# Patient Record
Sex: MALE | Race: WHITE | NOT HISPANIC OR LATINO | Employment: FULL TIME | RURAL
[De-identification: names, ages, dates, MRNs, and addresses within clinical notes are randomized per-mention and may not be internally consistent; named-entity substitution may affect disease eponyms.]

---

## 2017-01-08 PROBLEM — Z00.00 WELLNESS EXAMINATION: Status: ACTIVE | Noted: 2017-01-08

## 2017-01-08 PROBLEM — Z02.89 ENCOUNTER FOR EXAMINATION REQUIRED BY DEPARTMENT OF TRANSPORTATION (DOT): Status: ACTIVE | Noted: 2017-01-08

## 2017-01-08 PROBLEM — N40.0 PROSTATISM: Chronic | Status: ACTIVE | Noted: 2017-01-08

## 2017-01-08 PROBLEM — G47.33 OBSTRUCTIVE SLEEP APNEA: Status: ACTIVE | Noted: 2017-01-08

## 2017-01-08 PROBLEM — F41.9 ANXIETY: Chronic | Status: ACTIVE | Noted: 2017-01-08

## 2017-01-08 PROBLEM — F32.A DEPRESSION: Chronic | Status: ACTIVE | Noted: 2017-01-08

## 2017-01-08 PROBLEM — K64.9 HEMORRHOID: Status: ACTIVE | Noted: 2017-01-08

## 2017-01-08 PROBLEM — M19.90 DEGENERATIVE JOINT DISEASE: Chronic | Status: ACTIVE | Noted: 2017-01-08

## 2017-01-16 ENCOUNTER — TELEPHONE (OUTPATIENT)
Dept: FAMILY MEDICINE CLINIC | Facility: CLINIC | Age: 52
End: 2017-01-16

## 2017-01-16 NOTE — TELEPHONE ENCOUNTER
"Vm \"I have this cough I have seen Dr Pedersen about, and it seems like it goes away and comes back, can Dr Pedersen call me in something for it or do I need to be seen?\"  "

## 2017-01-18 ENCOUNTER — OFFICE VISIT (OUTPATIENT)
Dept: FAMILY MEDICINE CLINIC | Facility: CLINIC | Age: 52
End: 2017-01-18

## 2017-01-18 VITALS
HEART RATE: 92 BPM | TEMPERATURE: 96.8 F | SYSTOLIC BLOOD PRESSURE: 118 MMHG | RESPIRATION RATE: 20 BRPM | HEIGHT: 69 IN | BODY MASS INDEX: 27.7 KG/M2 | WEIGHT: 187 LBS | DIASTOLIC BLOOD PRESSURE: 62 MMHG | OXYGEN SATURATION: 95 %

## 2017-01-18 DIAGNOSIS — R06.2 WHEEZE: ICD-10-CM

## 2017-01-18 DIAGNOSIS — G47.33 OBSTRUCTIVE SLEEP APNEA: Primary | ICD-10-CM

## 2017-01-18 DIAGNOSIS — R05.9 COUGH: ICD-10-CM

## 2017-01-18 DIAGNOSIS — J31.0 CHRONIC RHINITIS: ICD-10-CM

## 2017-01-18 DIAGNOSIS — J40 BRONCHITIS: ICD-10-CM

## 2017-01-18 DIAGNOSIS — J30.89 PERENNIAL ALLERGIC RHINITIS, UNSPECIFIED ALLERGIC RHINITIS TRIGGER: ICD-10-CM

## 2017-01-18 DIAGNOSIS — J32.9 CHRONIC SINUSITIS, UNSPECIFIED LOCATION: ICD-10-CM

## 2017-01-18 PROCEDURE — 99213 OFFICE O/P EST LOW 20 MIN: CPT | Performed by: FAMILY MEDICINE

## 2017-01-18 RX ORDER — METHYLPREDNISOLONE 4 MG/1
TABLET ORAL
Qty: 21 TABLET | Refills: 0 | Status: SHIPPED | OUTPATIENT
Start: 2017-01-18 | End: 2017-02-10

## 2017-01-18 RX ORDER — GUAIFENESIN 600 MG/1
1200 TABLET, EXTENDED RELEASE ORAL 2 TIMES DAILY
COMMUNITY
End: 2017-02-10

## 2017-01-18 RX ORDER — CEFDINIR 300 MG/1
300 CAPSULE ORAL 2 TIMES DAILY
Qty: 28 CAPSULE | Refills: 0 | Status: SHIPPED | OUTPATIENT
Start: 2017-01-18 | End: 2017-02-10

## 2017-01-18 NOTE — PROGRESS NOTES
Subjective   Selvin Garcia is a 51 y.o. male presenting with chief complaint of:   Chief Complaint   Patient presents with   • Cough       History of Present Illness :  Alone. Daily history of nasal congestion; endoscopic surgery 7.2016 helped for awhile (insurance would not allow resser to do everything he wanted at the time).  Allergy testing 2 yr ago/[facility]; mildly allergic to few things and not enough to recommend shots.  No history of asthma or smoking.  Around [place] increased nasal congestion/drainage and cough.  Seen 12.28.16 and Z pack, medrol dose pack helped.  After that; sxs nasal/sinus congestion has returned bilateral nares.  Cough on/off; recent first time associated wheezing.  No fevers.  No heartburn, reflux feeling.     Other chronic problem/s to consider:    The following portions of the patient's history were reviewed and updated as appropriate: allergies, current medications, past family history, past medical history, past social history, past surgical history and problem list.  TCC migrated earlier/reviewed      Current Outpatient Prescriptions:   •  albuterol (PROVENTIL HFA;VENTOLIN HFA) 108 (90 BASE) MCG/ACT inhaler, Inhale 2 puffs Every 4 (Four) Hours As Needed for wheezing., Disp: 1 inhaler, Rfl: 1  •  fluticasone (FLONASE) 50 MCG/ACT nasal spray, INSTILL ONE SPRAY IN EACH NOSTRIL TWICE A DAY, Disp: 16 g, Rfl: 3  •  guaiFENesin (MUCINEX) 600 MG 12 hr tablet, Take 1,200 mg by mouth 2 (Two) Times a Day., Disp: , Rfl:   •  Loratadine (CLARITIN) 10 MG capsule, Take 1 tablet by mouth Daily., Disp: , Rfl:   •  montelukast (SINGULAIR) 10 MG tablet, TAKE ONE TABLET BY MOUTH EVERY NIGHT AT BEDTIME, Disp: 30 tablet, Rfl: 3  •  pantoprazole (PROTONIX) 40 MG EC tablet, Take 1 tablet by mouth Daily., Disp: 15 tablet, Rfl: 0    Allergies   Allergen Reactions   • Lexapro [Escitalopram Oxalate]    • Ultram [Tramadol Hcl]        Review of Systems  ROS:  GENERAL:  Active/slower with limits, speed,  "samni for age and alittle less \"breath\" with exertion; mild shortness of breath.  Sleeps ok; declines cpap.  No fever.  ENDO:  No syncope, near or diaphoretic sweaty spells.   HEENT: No head injury; occ headache,  No vision change, No hearing loss/pain/drainage.  No sore throat.  Daily nasal/sinus congestion/drainage. No epistaxis.  CHEST: No chest wall tenderness or mass. No hemoptysis.  CV: No exertional chest pain, palpatations, ankle edema.  GI: No heartburn, dysphagia, abdominal pain, diarrhea, constipation, rectal bleeding, or melena.  :  Voids without dysuria, or incontience to completion.  ORTHO: No painful/swollen joints but various on /off sore.  No change infrequent sore neck or back.  No acute neck or back pain without recent injury.   NEURO: No dizziness, weakness of extremities.  No numbness/parethesias.   PSYCH: No memory loss.  Mood good; not anxious, depressed or suicidal.  Tolerated stress.     Results for orders placed or performed during the hospital encounter of 07/26/16   CBC (No diff)   Result Value Ref Range    WBC 5.15 4.80 - 10.80 K/mcL    RBC 5.29 4.80 - 5.90 M/mcL    Hemoglobin 16.1 14.0 - 18.0 g/dL    Hematocrit 44.7 40.0 - 52.0 %    MCV 84.5 82.0 - 95.0 fL    MCH 30.4 28.0 - 32.0 pg    MCHC 36.0 33.0 - 36.0 gm/dL    RDW-SD 38.6 (L) 40.0 - 54.0 fL    RDW-CV 12.7 12.0 - 15.0 %    RDW 12.7 12 - 15 %    Platelets 264 130 - 400 K/mcL   Comprehensive metabolic panel   Result Value Ref Range    Sodium 143 135 - 145 mmol/L    Potassium 4.9 3.5 - 5.3 mmol/L    Chloride 104 98 - 110 mmol/L    CO2 27 24 - 31 mmol/L    Glucose 85 70 - 100 mg/dL    BUN 19 5 - 21 mg/dL    Creatinine 0.97 0.5 - 1.4 mg/dL    Calcium 9.0 8.4 - 10.4 mg/dL    Total Protein 7.2 6.3 - 8.7 g/dL    Albumin 4.1 3.5 - 5.0 g/dL    Total Bilirubin 0.9 0.1 - 1.0 mg/dL    Alkaline Phosphatase 64 24 - 120 Units/L    AST (SGOT) 31 7 - 45 Units/L    ALT (SGPT) 20 0 - 54 Units/L    Anion Gap 12 4 - 13 mmol/L    Est GFR by Clearance " "82 ml/min/1.732   Converted Surgical Pathology   Result Value Ref Range    CONVERTED (HISTORICAL) CASE TYPE Surgical Pathology     CONVERTED (HISTORICAL) ACCESSION NUMBER [id number]     CONVERTED (HISTORICAL) RESULT STATUS FINAL     CONVERTED (HISTORICAL) SPECIMEN DESCRIPTION Right and Left Sinus contents        No results found for: HGBA1C    Lab Results   Component Value Date     07/19/2016    K 4.9 07/19/2016     07/19/2016    CO2 27 07/19/2016    GLUCOSE 85 07/19/2016    BUN 19 07/19/2016    CREATININE 0.97 07/19/2016    CALCIUM 9.0 07/19/2016    EGFRCLEARA 82 07/19/2016       No results found for: PSA     Objective   Visit Vitals   • /62 (BP Location: Left arm, Patient Position: Sitting, Cuff Size: Adult)   • Pulse 92   • Temp 96.8 °F (36 °C) (Tympanic)   • Resp 20   • Ht 69\" (175.3 cm)   • Wt 187 lb (84.8 kg)   • SpO2 95%   • BMI 27.62 kg/m2       Physical Exam  GENERAL:  Well nourished/developed  in no acute distress.  SKIN: Turgor excellent, without wound, rash, lesion.   HEENT: Normal cephalic without trauma.  Pupils equal round reactive to light. Extraocular motions full without nystagmus.   External canals nonobstructive nontender without reddness. Tymphatic membranes lucy with willian structures intact.   Oral cavity without growths, exudates, and moist.  Posterior pharnyx without mass, obstruction, reddness.  No thyroidmegaly, mass, tenderness, lymphadenopathy and supple.   CV: Regular rhythm.  No murmur, gallop, edema. No carotid bruits.   CHEST: No chest wall tenderness or mass.   LUNGS: Symmetric motion with clear to auscultation.    ABD: Soft, nontender without mass.   ORTHO: Symmetric extremities without swelling/point tenderness.   NEURO: CN 2-12 grossly intact.  Symmetric facies. 4-5/5 strength throughout.  Nonfocal use extremities. Speech clear.    PSYCH: Oriented x 3.  Pleasant calm, well kept.  Purposeful/directed conservation with intact short/long gross memory. "     Assessment/Plan     1. Obstructive sleep apnea  Not significant; not treating    2. Chronic sinusitis, unspecified location  back    3. Perennial allergic rhinitis, unspecified allergic rhinitis trigger  Part of    4. Chronic rhinitis  back    5. Wheeze  With above-sinus related    6. Cough  Likely sinus related    Rx: reviewed/see above and:   Omnicef 300 two a day for 14 days  Medrol-again  LAB: reviewed past TCC as needed, above and new orders: none  Refer back to Resser; now or he wants to wait alittle while and see if helps  Wrap-up/other instructions  Patient Instructions   Get back with Resser as planned/later      Follow up: Return if symptoms worsen or fail to improve, for and a yearly exam once a year.

## 2017-01-18 NOTE — MR AVS SNAPSHOT
Selvin Garcia   1/18/2017 9:00 AM   Office Visit    Dept Phone:  [phone]   Encounter #:  [id number]    Provider:  Aquilino Pedersen MD   Department:  [facility] FAM[facility]                Your Full Care Plan              Today's Medication Changes          These changes are accurate as of: 1/18/17  9:43 AM.  If you have any questions, ask your nurse or doctor.               New Medication(s)Ordered:     cefdinir 300 MG capsule   Commonly known as:  OMNICEF   Take 1 capsule by mouth 2 (Two) Times a Day.   Started by:  Aquilino Pedersen MD            Where to Get Your Medications      These medications were sent to [facility] - [place], [place] - [address]  - [phone]  - [phone] FX  [address] , [facility] [address]     Phone:  [phone]     cefdinir 300 MG capsule    MethylPREDNISolone 4 MG tablet                  Your Updated Medication List          This list is accurate as of: 1/18/17  9:43 AM.  Always use your most recent med list.                albuterol 108 (90 BASE) MCG/ACT inhaler   Commonly known as:  PROVENTIL HFA;VENTOLIN HFA   Inhale 2 puffs Every 4 (Four) Hours As Needed for wheezing.       cefdinir 300 MG capsule   Commonly known as:  OMNICEF   Take 1 capsule by mouth 2 (Two) Times a Day.       CLARITIN 10 MG capsule   Generic drug:  Loratadine       fluticasone 50 MCG/ACT nasal spray   Commonly known as:  FLONASE   INSTILL ONE SPRAY IN EACH NOSTRIL TWICE A DAY       guaiFENesin 600 MG 12 hr tablet   Commonly known as:  MUCINEX       MethylPREDNISolone 4 MG tablet   Commonly known as:  MEDROL (FIDENCIO)   Take as directed on package instructions.       montelukast 10 MG tablet   Commonly known as:  SINGULAIR   TAKE ONE TABLET BY MOUTH EVERY NIGHT AT BEDTIME       pantoprazole 40 MG EC tablet   Commonly known as:  PROTONIX   Take 1 tablet by mouth Daily.               You Were Diagnosed With        Codes Comments    "Obstructive sleep apnea    -  Primary ICD-10-CM: G47.33  ICD-9-CM: 327.23     Chronic sinusitis, unspecified location     ICD-10-CM: J32.9  ICD-9-CM: 473.9     Perennial allergic rhinitis, unspecified allergic rhinitis trigger     ICD-10-CM: J30.89  ICD-9-CM: 477.8     Chronic rhinitis     ICD-10-CM: J31.0  ICD-9-CM: 472.0     Wheeze     ICD-10-CM: R06.2  ICD-9-CM: 786.07     Bronchitis     ICD-10-CM: J40  ICD-9-CM: 490     Cough     ICD-10-CM: R05  ICD-9-CM: 786.2       Instructions    Get back with Resser as planned/later     Patient Instructions History      Upcoming Appointments     Visit Type Date Time Department    OFFICE VISIT 2017  9:00 AM [facility]    FOLLOW UP 3/28/2017  3:45 PM [facility]    WELLNESS 2018  2:45 PM [facility]      MyChart Signup     Our records indicate that you have declined [facility] [id number] signup. If you would like to sign up for [id number], please email [email or web address] or call [phone] to obtain an activation code.             Other Info from Your Visit           Your Appointments     Mar 28, 2017  3:45 PM CDT   Follow Up with TAYLOR Stark   [facility] (--)    [address] [place] [address] [address]  [facility] [id number] [phone]           Arrive 15 minutes prior to appointment.            2018  2:45 PM CST   Wellness with Aquilino Pedersen MD   [facility] (--)    [address] [place] [id number] [phone]              Allergies     Lexapro [Escitalopram Oxalate]      Ultram [Tramadol Hcl]        Reason for Visit     Cough           Vital Signs     Blood Pressure Pulse Temperature Respirations Height Weight    118/62 (BP Location: Left arm, Patient Position: Sitting, Cuff Size: Adult) 92 96.8 °F (36 °C) (Tympanic) 20 69\" (175.3 cm) 187 lb (84.8 kg)    Oxygen Saturation Body Mass Index Smoking Status             95% 27.62 kg/m2 Never Smoker       "   Problems and Diagnoses Noted     Nasal inflammation due to allergen    Chronic inflammation of the nose    Chronic sinus infection    Sleep apnea    Wheeze        Bronchitis        Cough

## 2017-02-03 DIAGNOSIS — R05.9 COUGH: ICD-10-CM

## 2017-02-03 DIAGNOSIS — J40 BRONCHITIS: ICD-10-CM

## 2017-02-03 RX ORDER — ALBUTEROL SULFATE 90 UG/1
2 AEROSOL, METERED RESPIRATORY (INHALATION) EVERY 4 HOURS PRN
Qty: 3 INHALER | Refills: 0 | Status: SHIPPED | OUTPATIENT
Start: 2017-02-03 | End: 2017-04-14 | Stop reason: SDUPTHER

## 2017-02-10 ENCOUNTER — OFFICE VISIT (OUTPATIENT)
Dept: FAMILY MEDICINE CLINIC | Facility: CLINIC | Age: 52
End: 2017-02-10

## 2017-02-10 VITALS
TEMPERATURE: 97.2 F | SYSTOLIC BLOOD PRESSURE: 120 MMHG | WEIGHT: 186 LBS | HEIGHT: 69 IN | RESPIRATION RATE: 18 BRPM | HEART RATE: 88 BPM | OXYGEN SATURATION: 95 % | DIASTOLIC BLOOD PRESSURE: 80 MMHG | BODY MASS INDEX: 27.55 KG/M2

## 2017-02-10 DIAGNOSIS — R06.02 SHORTNESS OF BREATH: Primary | ICD-10-CM

## 2017-02-10 PROCEDURE — 99213 OFFICE O/P EST LOW 20 MIN: CPT | Performed by: FAMILY MEDICINE

## 2017-02-10 RX ORDER — EPINEPHRINE 0.3 MG/.3ML
0.3 INJECTION SUBCUTANEOUS ONCE
Qty: 2 EACH | Refills: 0 | Status: SHIPPED | OUTPATIENT
Start: 2017-02-10 | End: 2017-02-10

## 2017-02-10 NOTE — PROGRESS NOTES
Subjective   Selvin Garcia is a 51 y.o. male presenting with chief complaint of:   Chief Complaint   Patient presents with   • Cough   • Shortness of Breath       History of Present Illness :  Alone.   Has an acute issue today: sob episodes. Come/go and feel like chest tightness.  No relation to activity, time day, and exertion.  ProAir helps. Has had recent UTI type illness; but sinus congestion, cough resolved; these spells keep happening.  No choking, dysphagia. No heartburn beyond occassional.  Occ wheeze with this.  No past Dx asthma.      Other chronic problem/s to consider:  Allergic rhinitis:  This has been present for years/over a year.  The nasal/sinus congestion on/off has been present for years and is currently stable/ same as usual.     The following portions of the patient's history were reviewed and updated as appropriate: allergies, current medications, past family history, past medical history, past social history, past surgical history and problem list.  TCC migrated if needed/reviewed.      Current Outpatient Prescriptions:   •  albuterol (PROVENTIL HFA;VENTOLIN HFA) 108 (90 BASE) MCG/ACT inhaler, Inhale 2 puffs Every 4 (Four) Hours As Needed for wheezing., Disp: 3 inhaler, Rfl: 0  •  fluticasone (FLONASE) 50 MCG/ACT nasal spray, INSTILL ONE SPRAY IN EACH NOSTRIL TWICE A DAY, Disp: 16 g, Rfl: 3    Allergies   Allergen Reactions   • Lexapro [Escitalopram Oxalate]    • Ultram [Tramadol Hcl]        Review of Systems  GENERAL:  Active/slower with limits, speed, samni for age work/home. Sleep is ok. No fever.  ENDO:  No syncope, near or diaphoretic sweaty spells.  HEENT: No head injury  headache,  No vision change, No hearing loss.  Ears without pain/drainage.  No sore throat.  No nasal/sinus congestion/drainage. No epistaxis.  CHEST: No chest wall tenderness or mass. Occ cough, wheeze, SOB; no hemoptysis.  CV: No chest pain, palpatations, ankle edema.  GI: No heartburn, dysphagia.  No abdominal  pain, diarrhea, constipation, rectal bleeding, or melena.  :  Voids without dysuria, or incontience to completion.  ORTHO: No painful/swollen joints but various on /off sore.  No change rare sore neck or back.  No acute neck or back pain without recent injury.   NEURO: No dizziness, weakness of extremities.  No numbness/parethesias.   PSYCH: No memory loss.  Mood good; not anxious, depressed but/and not suicidal.  Tolerated stress.     Results for orders placed or performed during the hospital encounter of 07/26/16   CBC (No diff)   Result Value Ref Range    WBC 5.15 4.80 - 10.80 K/mcL    RBC 5.29 4.80 - 5.90 M/mcL    Hemoglobin 16.1 14.0 - 18.0 g/dL    Hematocrit 44.7 40.0 - 52.0 %    MCV 84.5 82.0 - 95.0 fL    MCH 30.4 28.0 - 32.0 pg    MCHC 36.0 33.0 - 36.0 gm/dL    RDW-SD 38.6 (L) 40.0 - 54.0 fL    RDW-CV 12.7 12.0 - 15.0 %    RDW 12.7 12 - 15 %    Platelets 264 130 - 400 K/mcL   Comprehensive metabolic panel   Result Value Ref Range    Sodium 143 135 - 145 mmol/L    Potassium 4.9 3.5 - 5.3 mmol/L    Chloride 104 98 - 110 mmol/L    CO2 27 24 - 31 mmol/L    Glucose 85 70 - 100 mg/dL    BUN 19 5 - 21 mg/dL    Creatinine 0.97 0.5 - 1.4 mg/dL    Calcium 9.0 8.4 - 10.4 mg/dL    Total Protein 7.2 6.3 - 8.7 g/dL    Albumin 4.1 3.5 - 5.0 g/dL    Total Bilirubin 0.9 0.1 - 1.0 mg/dL    Alkaline Phosphatase 64 24 - 120 Units/L    AST (SGOT) 31 7 - 45 Units/L    ALT (SGPT) 20 0 - 54 Units/L    Anion Gap 12 4 - 13 mmol/L    Est GFR by Clearance 82 ml/min/1.732   Converted Surgical Pathology   Result Value Ref Range    CONVERTED (HISTORICAL) CASE TYPE Surgical Pathology     CONVERTED (HISTORICAL) ACCESSION NUMBER [id number]     CONVERTED (HISTORICAL) RESULT STATUS FINAL     CONVERTED (HISTORICAL) SPECIMEN DESCRIPTION Right and Left Sinus contents        No results found for: HGBA1C    Lab Results   Component Value Date     07/19/2016    K 4.9 07/19/2016     07/19/2016    CO2 27 07/19/2016    GLUCOSE 85 07/19/2016  "   BUN 19 07/19/2016    CREATININE 0.97 07/19/2016    CALCIUM 9.0 07/19/2016    EGFRCLEARA 82 07/19/2016       No results found for: PSA     Objective   Visit Vitals   • /80 (BP Location: Left arm, Patient Position: Sitting, Cuff Size: Adult)   • Pulse 88   • Temp 97.2 °F (36.2 °C) (Tympanic)   • Resp 18   • Ht 69\" (175.3 cm)   • Wt 186 lb (84.4 kg)   • SpO2 95%   • BMI 27.47 kg/m2       Physical Exam  GENERAL:  Well nourished/developed in no acute distress.  SKIN: Turgor excellent, without wound, rash, lesion.  HEENT: Normal cephalic without trauma.  Pupils equal round reactive to light. Extraocular motions full without nystagmus.   External canals nonobstructive nontender without reddness. Tymphatic membranes lucy with willian structures intact.   Oral cavity without growths, exudates, and moist.  Posterior pharnyx without mass, obstruction, reddness.  No thyroidmegaly, mass, tenderness, lymphadenopathy and supple.  CV: Regular rhythm.  No murmur, gallop,  edema. Posterior pulses intact.  No carotid bruits.  CHEST: No chest wall tenderness or mass.   LUNGS: Symmetric motion with clear to auscultation.  No dullness to percussion  ABD: Soft, nontender without mass.   ORTHO: Symmetric extremities without swelling/point tenderness.  Full gross range of motion.    NEURO: CN 2-12 grossly intact.  Symmetric facies.  UE/LE   5/5 strength throughout.  Nonfocal use extremities. Speech clear.     PSYCH: Oriented x 3.  Pleasant calm, well kept.  Purposeful/directed conservation with intact short/long gross memory.     Assessment/Plan     1. Shortness of breath  Sounds as asthma  - Spirometry With Bronchodilator  - Spirometry Without Bronchodilator  - XR chest 2 vw  - EPINEPHrine (EPIPEN 2-FIDENCIO) 0.3 MG/0.3ML solution auto-injector injection; Inject 0.3 mL into the shoulder, thigh, or buttocks 1 (One) Time for 1 dose.  Dispense: 2 each; Refill: 0      Rx: reviewed.  Changes if above  LAB: reviewed/above, and if orders 6-12m "   Wrap-up/other instructions  Regular cardio exercise something everyone should consider and try to do; discussed  Normal weight a goal for everyone; discussed  Healthy diet helpful for weight management, illness prevention; discussed  If over 50-screening exams include men PSA/rectal exam, women mammograms, and  everyone colonoscopy screening for colon cancer.   There are no Patient Instructions on file for this visit.    Follow up: Return for based on test.

## 2017-02-21 ENCOUNTER — HOSPITAL ENCOUNTER (OUTPATIENT)
Dept: PULMONOLOGY | Facility: HOSPITAL | Age: 52
Discharge: HOME | End: 2017-02-21
Attending: FAMILY MEDICINE | Admitting: FAMILY MEDICINE

## 2017-02-21 ENCOUNTER — HOSPITAL ENCOUNTER (OUTPATIENT)
Dept: GENERAL RADIOLOGY | Facility: HOSPITAL | Age: 52
Discharge: HOME | End: 2017-02-21

## 2017-02-21 PROCEDURE — 94060 EVALUATION OF WHEEZING: CPT

## 2017-02-21 PROCEDURE — 71020 HC CHEST PA AND LATERAL: CPT

## 2017-02-21 RX ORDER — ALBUTEROL SULFATE 2.5 MG/3ML
2.5 SOLUTION RESPIRATORY (INHALATION) ONCE
Status: DISCONTINUED | OUTPATIENT
Start: 2017-02-21 | End: 2017-02-22 | Stop reason: HOSPADM

## 2017-03-14 ENCOUNTER — OFFICE VISIT (OUTPATIENT)
Dept: OTOLARYNGOLOGY | Facility: CLINIC | Age: 52
End: 2017-03-14

## 2017-03-14 VITALS
SYSTOLIC BLOOD PRESSURE: 133 MMHG | DIASTOLIC BLOOD PRESSURE: 85 MMHG | BODY MASS INDEX: 26.51 KG/M2 | TEMPERATURE: 97.9 F | HEIGHT: 69 IN | HEART RATE: 51 BPM | WEIGHT: 179 LBS

## 2017-03-14 DIAGNOSIS — J32.9 CHRONIC SINUSITIS, UNSPECIFIED LOCATION: Primary | ICD-10-CM

## 2017-03-14 DIAGNOSIS — J30.89 PERENNIAL ALLERGIC RHINITIS, UNSPECIFIED ALLERGIC RHINITIS TRIGGER: ICD-10-CM

## 2017-03-14 PROCEDURE — 99213 OFFICE O/P EST LOW 20 MIN: CPT | Performed by: NURSE PRACTITIONER

## 2017-03-14 RX ORDER — AMOXICILLIN AND CLAVULANATE POTASSIUM 875; 125 MG/1; MG/1
1 TABLET, FILM COATED ORAL 2 TIMES DAILY
Qty: 10 TABLET | Refills: 1 | Status: SHIPPED | OUTPATIENT
Start: 2017-03-14 | End: 2017-03-24

## 2017-03-14 RX ORDER — PREDNISONE 10 MG/1
TABLET ORAL
Qty: 47 TABLET | Refills: 0 | Status: SHIPPED | OUTPATIENT
Start: 2017-03-14 | End: 2017-03-29

## 2017-03-14 RX ORDER — CETIRIZINE HYDROCHLORIDE 10 MG/1
10 TABLET ORAL DAILY
Qty: 30 TABLET | Refills: 3 | Status: SHIPPED | OUTPATIENT
Start: 2017-03-14 | End: 2017-04-13

## 2017-03-14 NOTE — PATIENT INSTRUCTIONS
Use Afrin spray, neosynephrine or other decongestant spray for 3 days. After using, use a Conchis pot or Neilmed Sinus Rinse or similar nasal irrigation device. Do not use the decongestant spray for more than 3 days or you may develop a rebound rhinitis/ nasal congestion. Make sure you boil the water before mixing the saline and let the mix cool to a comfortable temperature before using.       Salt Water Rinse for the Nose (Buffered Hypertonic Saline Nasal Irrigation)    The Benefits  • When you rinse your nose with this salt water and baking soda mixture, it washes crusts and other debris from your nose.  • Salty water pulls fluid out of the swollen membranes of your nose.  This decongests the nose and improves air flow.  Not only does this make breathing easier, but it helps open the sinus passages.  • Studies show that this mixture of concentrated salt water and baking soda (bicarbonate) helps the nose work better and moves mucus out of the nose faster.    The Recipe  • Carefully clean and rinse a 1-quart glass jar.  Fill the clean jar with tap water or bottled water.  You do not have to boil the water.  • Add 2 to 3 heaping teaspoons of “pickling/yong” salt.  Do not use table salt, which has unwanted additives.  You can ask for pickling/yong salt at the grocery store.    • Add 1 rounded teaspoon of baking soda (pure bicarbonate).  • Stir or shake before each use.  Store at room temperature.  After a week, pour out any mixture that is left over and make a new recipe.  • If the mixture seems too strong, use less salt-try 1-1/2 to 2 teaspoons of salt. For children, it is best to start with a weaker salt water mixture.  Then gradually increase to using 2 to 3 heaping teaspoons of salt, or whatever the child will accept.    How to Rinse the Nose with Salt Water (Buffered Hypertonic Saline Nasal Irrigation)    The Instructions  Plan to rinse the nose with the salt water mixture 2 to 3 times each day.  Make the  salt water and baking soda mixture according to the recipe.  You will need a bulb/ear syringe, a large medical syringe (30 ml), or a Waterpik.  • Pour some salt water mixture into a clean bowl.  Many people like to warm the sale water in a microwave oven to about body temperature.  Be sure that the salt water is not hot.  • Fill the syringe with salt water from the bowl.  Do not put your used syringe back into the jar, because that will contaminate your salt water.  • Stand over the sink or in the shower and squirt the salt water into each side of your nose.  Aim the stream toward the back of your head, not the top of your head.  This lets you spit some of the salt water out of your mouth.  It will not hurt if you swallow a little.  • Most people notice a mild burning feeling the first few times they use the salt water mixture.  This usually goes away in a few days.  Please call our office if you have any problems or questions.    For Young Children  You can put the salt water into a small commercial spray container, like a nasal steroid spray bottle.  Squirt it many times into each side of the nose.  Do not force your child to lie down.  This rinse is easier to do when sitting or standing.    If you use a Nasal Steroid  You should always use the salt water mixture first, then use your nasal steroid spray (like Flonase, Vancenase, Beconase, Nasacort).  The steroid works better when it is sprayed onto nasal membranes that have been cleaned and decongested by the salt water.  Then the steroid medicine will reach deeper into the nose and sinuses.

## 2017-03-14 NOTE — PROGRESS NOTES
Patient Care Team:  Aquilino Pedersen MD as PCP - General  Aquilino Pedersen MD as PCP - Family Medicine  Oscar Urrutia MD as Consulting Physician (Otolaryngology)  TAYLOR Stark as Physician Assistant (Otolaryngology)    Chief Complaint   Patient presents with   • Sinus Problem     Sinus Infection/6 month follow up       Subjective   History of Present Illness:  Selvin Garcia is a  51 y.o.  male who complains of nasal drainage, nasal congestion, repeated sinusitis, sinus pressure, postnasal drip, allergy, sneezing, itchy eyes and headaches. The symptoms are not localized to a particular location. The patient has had moderate symptoms. The symptoms have been chronically occuring for the last several months . The symptoms are aggravated by allergy . The symptoms are improved by antibiotics and steroids.     The patient has had a previous FESS in July 2016. He states that postoperatively had relief for a short length of time; however, symptoms have returned that are as severe as his pre-op symptoms. He has been allergy tested in the past (Aug 2015) and was not started on immunotherapy. He currently takes Singulair and Flonase; he does not feel that Singulair works.     The patient has been on 2-3 antibiotics and 2 medrol packs since December with minimal relief. He states he has consistent thick, yellow drainage that he blows out and coughs up daily. He has sinus pressure and frequent headaches as well.     Review of Systems:  Review of Systems   Constitutional: Negative for chills and fever.   HENT:        See HPI   Eyes: Negative.    Respiratory: Positive for cough. Negative for shortness of breath.    Cardiovascular: Negative.    Gastrointestinal: Negative for nausea and vomiting.   Allergic/Immunologic: Positive for environmental allergies. Negative for food allergies.   Neurological: Positive for headaches. Negative for dizziness.   Hematological: Negative.    Psychiatric/Behavioral:  Negative.        Past History:  Past Medical History   Diagnosis Date   • Abnormal CT of paranasal sinuses 08/20/2015     (Note 1 of 3)  NASAL SEPTUM:  The nasal septum is relatively midline.  MAXILLARY SINUSES:  The left maxillary sinus showed >5 m mucosal thickening and 50% opacification and the right maxillary sinus showed >5 mm mucosal thickening. The osteomeatal complex is obstructed bilaterally.   • Abnormal CT of paranasal sinuses 08/20/2015     (Note 2 of 3)  NASAL TURBINATES: The inferior turbinates showed bilateral hypertrophy. The Cherie bullosa are not present.  ETHMOID SINUSES: The left ethmoid sinus showed >3 mm mucosal thickening and complete opacification. The lamina papyracea appears intact bilaterally.   • Abnormal CT of paranasal sinuses 08/20/2015     (Note 3 of 3)  FRONTAL SINUSES:  The left frontal sinus showed complete opacification. The right frontal sinus showed complete opacification. SPENOID SINUSES: The left sphenoid sinus showed moderate mucosal thickening. The right sphenoid sinus showed moderate mucosal thickening.   • Allergic rhinitis 12/01/2015   • Chronic rhinitis    • Chronic sinusitis    • Disturbances of sensation of smell and taste 12/01/2015   • Sensorineural hearing loss, bilateral 12/01/2015   • Sleep apnea    • Subjective tinnitus 12/01/2015     Past Surgical History   Procedure Laterality Date   • Functional endoscopic sinus surgery  07/26/2016   • Other surgical history       Open Reduction-Internal Fixation   • Septoplasty       Per Dr. Urrutia 7-8 years ago     Family History   Problem Relation Age of Onset   • Diabetes Other      Social History   Substance Use Topics   • Smoking status: Never Smoker   • Smokeless tobacco: Never Used   • Alcohol use Yes      Comment: Current some day - 1 week       Current Outpatient Prescriptions:   •  albuterol (PROVENTIL HFA;VENTOLIN HFA) 108 (90 BASE) MCG/ACT inhaler, Inhale 2 puffs Every 4 (Four) Hours As Needed for wheezing., Disp:  3 inhaler, Rfl: 0  •  beclomethasone (QVAR) 80 MCG/ACT inhaler, Inhale 1 puff 2 (Two) Times a Day., Disp: 3 inhaler, Rfl: 1  •  fluticasone (FLONASE) 50 MCG/ACT nasal spray, INSTILL ONE SPRAY IN EACH NOSTRIL TWICE A DAY, Disp: 16 g, Rfl: 3  •  montelukast (SINGULAIR) 10 MG tablet, TAKE ONE TABLET BY MOUTH EVERY NIGHT AT BEDTIME, Disp: 30 tablet, Rfl: 3  •  amoxicillin-clavulanate (AUGMENTIN) 875-125 MG per tablet, Take 1 tablet by mouth 2 (Two) Times a Day for 10 days., Disp: 10 tablet, Rfl: 1  •  cetirizine (zyrTEC) 10 MG tablet, Take 1 tablet by mouth Daily for 30 days. 1 by mouth every day as needed for allergy symptoms, Disp: 30 tablet, Rfl: 3  •  predniSONE (DELTASONE) 10 MG tablet, Take this number of pills twice daily: day 1-3: 2.5 pills, day 4-6:  2 pills, day 7-9: 1.5 pills, day 10-12: 1 pill, day 13-15: 0.5 pill, Disp: 47 tablet, Rfl: 0  Allergies:  Lexapro [escitalopram oxalate] and Ultram [tramadol hcl]    Objective     Vital Signs:  Temp:  [97.9 °F (36.6 °C)] 97.9 °F (36.6 °C)  Heart Rate:  [51] 51  BP: (133)/(85) 133/85    Physical Exam:  CONSTITUTIONAL: well nourished, well-developed, alert, oriented, in no acute distress   COMMUNICATION AND VOICE: able to communicate normally, normal voice quality  HEAD: normocephalic, no lesions, atraumatic, no tenderness, no masses   FACE: appearance normal, no lesions, no tenderness, no deformities, facial motion symmetric  SALIVARY GLANDS: parotid glands with no tenderness, no swelling, no masses, submandibular glands with normal size, nontender  EYES: ocular motility normal, eyelids normal, orbits normal, no proptosis, conjunctiva normal , pupils equal, round   EARS:  Hearing: response to conversational voice normal bilaterally   External Ears: auricles without lesions  Otoscopic: tympanic membrane appearance normal, no lesions, no perforation, normal mobility, no fluid  NOSE:  External Nose: structure normal, no tenderness on palpation, no nasal discharge, no  lesions, no evidence of trauma, nostrils patent   Intranasal Exam: nasal mucosa with moderate erythema and inflammation, vestibule within normal limits, inferior turbinate normal, nasal septum midline   ORAL:  Lips: upper and lower lips without lesion   Teeth: dentition within normal limits for age   Gums: gingivae healthy   Oral Mucosa: oral mucosa normal, no mucosal lesions   Floor of Mouth: Warthin’s duct patent, mucosa normal  Tongue: lingual mucosa normal without lesions, normal tongue mobility   Palate: soft and hard palates with normal mucosa and structure  Oropharynx: oropharyngeal mucosa with stringy exudates posteriorly, tonsils normal in appearance   NECK: neck appearance normal, no masses or tenderness  THYROID: no overt thyromegaly, no tenderness, nodules or mass present on palpation, position midline   LYMPH NODES: no lymphadenopathy  CHEST/RESPIRATORY: respiratory effort normal, normal breath sounds   CARDIOVASCULAR: rate and rhythm normal, extremities without cyanosis or edema    NEUROLOGIC/PSYCHIATRIC: oriented to time, place and person, mood normal, affect appropriate, CN II-XII intact grossly    Results Review:   None.     Assessment   1. Chronic sinusitis, unspecified location    2. Perennial allergic rhinitis, unspecified allergic rhinitis trigger        Plan   Continue current management plan. Will try a longer course of antibiotics with a stronger steroid taper and switch antihistamine. At follow-up we will do a CT of his sinuses. Continue nasal sprays as directed. I have given him instructions for a nasal lavage as well.      -------MEDICATIONS:-------  New Medications Ordered This Visit   Medications   • amoxicillin-clavulanate (AUGMENTIN) 875-125 MG per tablet     Sig: Take 1 tablet by mouth 2 (Two) Times a Day for 10 days.     Dispense:  10 tablet     Refill:  1     10 day course with 1 refill for a total of 20 days of antibiotics   • predniSONE (DELTASONE) 10 MG tablet     Sig: Take this  number of pills twice daily: day 1-3: 2.5 pills, day 4-6:  2 pills, day 7-9: 1.5 pills, day 10-12: 1 pill, day 13-15: 0.5 pill     Dispense:  47 tablet     Refill:  0   • cetirizine (zyrTEC) 10 MG tablet     Sig: Take 1 tablet by mouth Daily for 30 days. 1 by mouth every day as needed for allergy symptoms     Dispense:  30 tablet     Refill:  3           -----INSTRUCTIONS-----  For the best response, use your nasal sprays every day without skipping doses. It may take several weeks before the full effect is acheived.    Use Afrin spray, neosynephrine or other decongestant spray for 3 days. After using, use a [place] pot or Neilmed Sinus Rinse or similar nasal irrigation device. Do not use the decongestant spray for more than 3 days or you may develop a rebound rhinitis/ nasal congestion. Make sure you boil the water before mixing the saline and let the mix cool to a comfortable temperature before using.      -----FOLLOW UP-----  Return in about 6 weeks (around 4/25/2017) for Recheck sinus with CT.    Dotty Mary, RIRI  03/14/17  12:17 PM

## 2017-03-27 ENCOUNTER — OFFICE VISIT (OUTPATIENT)
Dept: FAMILY MEDICINE CLINIC | Facility: CLINIC | Age: 52
End: 2017-03-27

## 2017-03-27 VITALS
HEART RATE: 62 BPM | TEMPERATURE: 98.4 F | HEIGHT: 69 IN | BODY MASS INDEX: 27.55 KG/M2 | RESPIRATION RATE: 18 BRPM | OXYGEN SATURATION: 98 % | DIASTOLIC BLOOD PRESSURE: 70 MMHG | WEIGHT: 186 LBS | SYSTOLIC BLOOD PRESSURE: 120 MMHG

## 2017-03-27 DIAGNOSIS — J31.0 CHRONIC RHINITIS: ICD-10-CM

## 2017-03-27 DIAGNOSIS — J32.9 CHRONIC SINUSITIS, UNSPECIFIED LOCATION: ICD-10-CM

## 2017-03-27 DIAGNOSIS — F32.A DEPRESSION, UNSPECIFIED DEPRESSION TYPE: Primary | Chronic | ICD-10-CM

## 2017-03-27 DIAGNOSIS — F41.9 ANXIETY: Chronic | ICD-10-CM

## 2017-03-27 DIAGNOSIS — R06.02 SHORTNESS OF BREATH: ICD-10-CM

## 2017-03-27 PROCEDURE — 99213 OFFICE O/P EST LOW 20 MIN: CPT | Performed by: FAMILY MEDICINE

## 2017-03-27 NOTE — PROGRESS NOTES
Subjective   Selvin Garcia is a 51 y.o. male presenting with chief complaint of:   Chief Complaint   Patient presents with   • Depression   • Anxiety   • Shortness of Breath       History of Present Illness :  Alone. Recent visit with cough; sent back to [facility] after sinus surgery 7.2016.  CT sinus showed concerns.  Working with [facility] office; current Rx has stopped cough.  No heartburn, reflux, wheeze; feeling better.      Other chronic problem/s to consider:  Obstructive Sleep Apnea:  This has been present for years/over a year.  It is chronic.    They are not compliant with use of Cpap machine; not believing he has JESSY anymore.     The following portions of the patient's history were reviewed and updated as appropriate: allergies, current medications, past family history, past medical history, past social history, past surgical history and problem list.  TCC migrated if needed/reviewed.      Current Outpatient Prescriptions:   •  albuterol (PROVENTIL HFA;VENTOLIN HFA) 108 (90 BASE) MCG/ACT inhaler, Inhale 2 puffs Every 4 (Four) Hours As Needed for wheezing., Disp: 3 inhaler, Rfl: 0  •  cetirizine (zyrTEC) 10 MG tablet, Take 1 tablet by mouth Daily for 30 days. 1 by mouth every day as needed for allergy symptoms, Disp: 30 tablet, Rfl: 3  •  fluticasone (FLONASE) 50 MCG/ACT nasal spray, INSTILL ONE SPRAY IN EACH NOSTRIL TWICE A DAY, Disp: 16 g, Rfl: 3  •  predniSONE (DELTASONE) 10 MG tablet, Take this number of pills twice daily: day 1-3: 2.5 pills, day 4-6:  2 pills, day 7-9: 1.5 pills, day 10-12: 1 pill, day 13-15: 0.5 pill, Disp: 47 tablet, Rfl: 0    Allergies   Allergen Reactions   • Lexapro [Escitalopram Oxalate]    • Ultram [Tramadol Hcl]        Review of Systems  GENERAL:  Active home/work. . Sleep is ok. No fever now.   ENDO:  No syncope, near or diaphoretic sweaty spells.  HEENT: No head injury or headache,  No vision change, No hearing loss.  Ears without pain/drainage.  No sore throat.  Improved/less  nasal/sinus congestion/drainage. No epistaxis.  CHEST: No chest wall tenderness or mass. No cough,  without wheeze, SOB; no hemoptysis.  CV: No chest pain, palpatations, ankle edema.  GI: No heartburn, dysphagia.  No abdominal pain, diarrhea, constipation, rectal bleeding, or melena.    :  Voids without dysuria, or incontience to completion.  ORTHO: No painful/swollen joints but various on /off sore.  No change occ sore neck or back.  No acute neck or back pain without recent injury.   NEURO: No dizziness, weakness of extremities.  No numbness/parethesias.   PSYCH: No memory loss.  Mood good; not that anxious, depressed but/and not suicidal.  Tolerated stress.     Results for orders placed or performed during the hospital encounter of 07/26/16   CBC (No diff)   Result Value Ref Range    WBC 5.15 4.80 - 10.80 K/mcL    RBC 5.29 4.80 - 5.90 M/mcL    Hemoglobin 16.1 14.0 - 18.0 g/dL    Hematocrit 44.7 40.0 - 52.0 %    MCV 84.5 82.0 - 95.0 fL    MCH 30.4 28.0 - 32.0 pg    MCHC 36.0 33.0 - 36.0 gm/dL    RDW-SD 38.6 (L) 40.0 - 54.0 fL    RDW-CV 12.7 12.0 - 15.0 %    RDW 12.7 12 - 15 %    Platelets 264 130 - 400 K/mcL   Comprehensive metabolic panel   Result Value Ref Range    Sodium 143 135 - 145 mmol/L    Potassium 4.9 3.5 - 5.3 mmol/L    Chloride 104 98 - 110 mmol/L    CO2 27 24 - 31 mmol/L    Glucose 85 70 - 100 mg/dL    BUN 19 5 - 21 mg/dL    Creatinine 0.97 0.5 - 1.4 mg/dL    Calcium 9.0 8.4 - 10.4 mg/dL    Total Protein 7.2 6.3 - 8.7 g/dL    Albumin 4.1 3.5 - 5.0 g/dL    Total Bilirubin 0.9 0.1 - 1.0 mg/dL    Alkaline Phosphatase 64 24 - 120 Units/L    AST (SGOT) 31 7 - 45 Units/L    ALT (SGPT) 20 0 - 54 Units/L    Anion Gap 12 4 - 13 mmol/L    Est GFR by Clearance 82 ml/min/1.732   Converted Surgical Pathology   Result Value Ref Range    CONVERTED (HISTORICAL) CASE TYPE Surgical Pathology     CONVERTED (HISTORICAL) ACCESSION NUMBER [id number]     CONVERTED (HISTORICAL) RESULT STATUS FINAL     CONVERTED (HISTORICAL)  "SPECIMEN DESCRIPTION Right and Left Sinus contents        CBC:     BMP:    THYROID:      Invalid input(s): T4  LIPID:      Invalid input(s): TOTAL CHOLESTROL, TRIGLYCERIDES  PSA:      No results found for: HGBA1C    Lab Results   Component Value Date     07/19/2016    K 4.9 07/19/2016     07/19/2016    CO2 27 07/19/2016    GLUCOSE 85 07/19/2016    BUN 19 07/19/2016    CREATININE 0.97 07/19/2016    CALCIUM 9.0 07/19/2016    EGFRCLEARA 82 07/19/2016       No results found for: PSA     Objective   /70 (BP Location: Left arm, Patient Position: Sitting, Cuff Size: Adult)  Pulse 62  Temp 98.4 °F (36.9 °C) (Oral)   Resp 18  Ht 69\" (175.3 cm)  Wt 186 lb (84.4 kg)  SpO2 98%  BMI 27.47 kg/m2    Physical Exam  GENERAL:  Well nourished/developed in no acute distress.   SKIN: Turgor excellent, without wound, rash, lesion.  HEENT: Normal cephalic without trauma.  Pupils equal round reactive to light. Extraocular motions full without nystagmus.   External canals nonobstructive nontender without reddness. Tymphatic membranes lucy with willian structures intact.   Oral cavity without growths, exudates, and moist.  Posterior pharnyx without mass, obstruction, reddness.  No thyroidmegaly, mass, tenderness, lymphadenopathy and supple.  CV: Regular rhythm.  No murmur, gallop,  edema.  CHEST: No chest wall tenderness or mass.   LUNGS: Symmetric motion with clear to auscultation.   ABD: Soft, nontender without mass.   ORTHO: Symmetric extremities without swelling/point tenderness.  Full gross range of motion.    NEURO: CN 2-12 grossly intact.  Symmetric facies.  UE/LE   3-4/5 strength throughout.  Nonfocal use extremities. Speech clear.     PSYCH: Oriented x 3.  Pleasant calm, well kept.  Purposeful/directed conservation with intact short/long gross memory.     Assessment/Plan     1. Depression, unspecified depression type  History; doing well now    2. Anxiety  Same as depression    3. Shortness of " breath  Resolved; was more associated with resumption of sinus issues    4. Chronic rhinitis  same    5. Chronic sinusitis, unspecified location  Same       Rx: reviewed; finish resser  LAB: reviewed/above; at least yearly  Wrap-up/other instructions  Regular cardio exercise something everyone should consider and try to do; discussed  Normal weight a goal for everyone; discussed  Healthy diet helpful for weight management, illness prevention; discussed  If over 50-screening exams include men PSA/rectal exam, women mammograms, and  everyone colonoscopy screening for colon cancer.   There are no Patient Instructions on file for this visit.    Follow up: Return for as planned 1.12.18.

## 2017-04-14 DIAGNOSIS — R05.9 COUGH: ICD-10-CM

## 2017-04-14 DIAGNOSIS — J40 BRONCHITIS: ICD-10-CM

## 2017-04-26 ENCOUNTER — OFFICE VISIT (OUTPATIENT)
Dept: OTOLARYNGOLOGY | Facility: CLINIC | Age: 52
End: 2017-04-26

## 2017-04-26 VITALS
SYSTOLIC BLOOD PRESSURE: 125 MMHG | BODY MASS INDEX: 27.4 KG/M2 | TEMPERATURE: 98 F | DIASTOLIC BLOOD PRESSURE: 82 MMHG | WEIGHT: 185 LBS | HEART RATE: 77 BPM | HEIGHT: 69 IN

## 2017-04-26 DIAGNOSIS — J32.9 CHRONIC SINUSITIS, UNSPECIFIED LOCATION: ICD-10-CM

## 2017-04-26 DIAGNOSIS — J30.89 PERENNIAL ALLERGIC RHINITIS, UNSPECIFIED ALLERGIC RHINITIS TRIGGER: Primary | ICD-10-CM

## 2017-04-26 PROCEDURE — 99213 OFFICE O/P EST LOW 20 MIN: CPT | Performed by: OTOLARYNGOLOGY

## 2017-04-26 RX ORDER — AZELASTINE 1 MG/ML
2 SPRAY, METERED NASAL 2 TIMES DAILY
Qty: 30 ML | Refills: 6 | Status: SHIPPED | OUTPATIENT
Start: 2017-04-26 | End: 2017-05-26

## 2017-04-26 RX ORDER — CETIRIZINE HYDROCHLORIDE 10 MG/1
TABLET ORAL
Refills: 3 | COMMUNITY
Start: 2017-04-19 | End: 2017-06-12 | Stop reason: SDUPTHER

## 2017-04-26 RX ORDER — AZELASTINE 1 MG/ML
2 SPRAY, METERED NASAL 2 TIMES DAILY
Qty: 30 ML | Refills: 6 | Status: SHIPPED | OUTPATIENT
Start: 2017-04-26 | End: 2017-04-26 | Stop reason: SDUPTHER

## 2017-04-26 RX ORDER — AZELASTINE HYDROCHLORIDE 0.5 MG/ML
1 SOLUTION/ DROPS OPHTHALMIC 2 TIMES DAILY
Qty: 6 ML | Refills: 12 | Status: SHIPPED | OUTPATIENT
Start: 2017-04-26 | End: 2017-06-12 | Stop reason: SDUPTHER

## 2017-04-26 NOTE — PROGRESS NOTES
Patient Care Team:  Aquilino Pedersen MD as PCP - General  Aquilino Pedersen MD as PCP - Family Medicine  Oscar Urrutia MD as Consulting Physician (Otolaryngology)  TAYLOR Stark as Physician Assistant (Otolaryngology)    Chief Complaint   Patient presents with   • Follow-up     Re-Check Sinus w/CT Scan       Subjective   History of Present Illness:  Selvin Garcia is a  51 y.o.  male who is here for follow up sinusitus/allergy/CT sinus results. He has had some improvement but continued nasal drainage and headaches. The symptoms are not localized to a particular location. He has had mild symptoms. The symptoms have been intermittant for the last several months . The symptoms are aggravated by  allergy . The symptoms are improved by antihistamines, nasal sprays.  He denies congestion and sinusitis. Patient added Zyrtec 6 weeks and states he has noticed significant improvement since then. He has previously had allergy testing.     Review of Systems:  Review of Systems   Constitutional: Negative for chills and fever.   HENT:        See HPI   Eyes: Negative.    Respiratory: Negative.    Cardiovascular: Negative.    Gastrointestinal: Negative.    Allergic/Immunologic: Positive for environmental allergies. Negative for food allergies.   Neurological: Negative.    Hematological: Negative.    Psychiatric/Behavioral: Negative.        Past History:  Past Medical History:   Diagnosis Date   • Abnormal CT of paranasal sinuses 08/20/2015    (Note 1 of 3)  NASAL SEPTUM:  The nasal septum is relatively midline.  MAXILLARY SINUSES:  The left maxillary sinus showed >5 m mucosal thickening and 50% opacification and the right maxillary sinus showed >5 mm mucosal thickening. The osteomeatal complex is obstructed bilaterally.   • Abnormal CT of paranasal sinuses 08/20/2015    (Note 2 of 3)  NASAL TURBINATES: The inferior turbinates showed bilateral hypertrophy. The Cherie bullosa are not present.  ETHMOID  SINUSES: The left ethmoid sinus showed >3 mm mucosal thickening and complete opacification. The lamina papyracea appears intact bilaterally.   • Abnormal CT of paranasal sinuses 08/20/2015    (Note 3 of 3)  FRONTAL SINUSES:  The left frontal sinus showed complete opacification. The right frontal sinus showed complete opacification. SPENOID SINUSES: The left sphenoid sinus showed moderate mucosal thickening. The right sphenoid sinus showed moderate mucosal thickening.   • Allergic rhinitis 12/01/2015   • Chronic rhinitis    • Chronic sinusitis    • Disturbances of sensation of smell and taste 12/01/2015   • Sensorineural hearing loss, bilateral 12/01/2015   • Sleep apnea    • Subjective tinnitus 12/01/2015     Past Surgical History:   Procedure Laterality Date   • FUNCTIONAL ENDOSCOPIC SINUS SURGERY  07/26/2016   • OTHER SURGICAL HISTORY      Open Reduction-Internal Fixation   • SEPTOPLASTY      Per Dr. Urrutia 7-8 years ago     Family History   Problem Relation Age of Onset   • Diabetes Other      Social History   Substance Use Topics   • Smoking status: Never Smoker   • Smokeless tobacco: Never Used   • Alcohol use Yes      Comment: Current some day - 1 week       Current Outpatient Prescriptions:   •  cetirizine (zyrTEC) 10 MG tablet, , Disp: , Rfl: 3  •  fluticasone (FLONASE) 50 MCG/ACT nasal spray, INSTILL ONE SPRAY IN EACH NOSTRIL TWICE A DAY, Disp: 16 g, Rfl: 3  •  PROAIR  (90 BASE) MCG/ACT inhaler, USE 2 INHALATIONS EVERY 4 HOURS AS NEEDED FOR WHEEZING, Disp: 25.5 g, Rfl: 0  •  azelastine (ASTELIN) 0.1 % nasal spray, 2 sprays into each nostril 2 (Two) Times a Day for 30 days. Use in each nostril as directed, Disp: 30 mL, Rfl: 6  •  azelastine (OPTIVAR) 0.05 % ophthalmic solution, Administer 1 drop to both eyes 2 (Two) Times a Day., Disp: 6 mL, Rfl: 12  Allergies:  Lexapro [escitalopram oxalate] and Ultram [tramadol hcl]    Objective     Vital Signs:  Temp:  [98 °F (36.7 °C)] 98 °F (36.7 °C)  Heart Rate:   [77] 77  BP: (125)/(82) 125/82    Physical Exam:  CONSTITUTIONAL: well nourished, well-developed, alert, oriented, in no acute distress   COMMUNICATION AND VOICE: able to communicate normally for age, normal voice quality  HEAD: normocephalic, no lesions, atraumatic, no tenderness, no masses   FACE: appearance normal, no lesions, no tenderness, no deformities, facial motion symmetric  EYES: ocular motility normal, eyelids normal, orbits normal, no proptosis, conjunctiva normal , pupils equal, round   EARS:  Hearing: response to conversational voice normal bilaterally   External Ears: auricles without lesions  NOSE:  External Nose: structure normal, no tenderness on palpation, no nasal discharge, no lesions, no evidence of trauma, nostrils patent   Intranasal exam: nasal mucosa with mucosal congestion and erythema, nasal septum relatively midline   ORAL:  Lips: upper and lower lips without lesion   NECK: neck appearance normal  CHEST/RESPIRATORY: respiratory effort normal, normal chest excursion; no wheezes  CARDIOVASCULAR: extremities without cyanosis or edema   NEUROLOGIC/PSYCHIATRIC: oriented appropriately, mood normal, affect appropriate, CN II-XII intact grossly      Results Review:   CT sinus reviewed.          Assessment   1. Perennial allergic rhinitis, unspecified allergic rhinitis trigger    2. Chronic sinusitis, unspecified location        Plan   Continue current management plan. We have discussed continuing with medical management versus additional sinus surgery. His allergy testing results were fairly negative, however, he may benefit from immunotherapy as well. At this time the patient would like to proceed with current medications, including adding Astelin and going back on Singulair.      -------MEDICATIONS:-------  New Medications Ordered This Visit   Medications   • azelastine (OPTIVAR) 0.05 % ophthalmic solution     Sig: Administer 1 drop to both eyes 2 (Two) Times a Day.     Dispense:  6 mL      Refill:  12   • azelastine (ASTELIN) 0.1 % nasal spray     Si sprays into each nostril 2 (Two) Times a Day for 30 days. Use in each nostril as directed     Dispense:  30 mL     Refill:  6        -----INSTRUCTIONS-----  For the best response, use your nasal sprays every day without skipping doses. It may take several weeks before the full effect is acheived.      -----FOLLOW UP-----  Return in about 4 months (around 2017) for Recheck allergy/sinus.      RIRI Ramirez  17  9:20 AM

## 2017-04-26 NOTE — PROGRESS NOTES
I have seen and examined Selvin Garcia and have reviewed the notes, assessments, and/or procedures and I concur with this documentation.    He has improved the symptoms from the previous visit.  His symptoms sound fairly allergic as they seem to be responding with Zyrtec.  Previous allergy testing was weakly positive.  His repeat CT scan has open antrostomies and anterior ethmoidectomies, but with a lot of inflammatory disease in the frontal sinus, maxillary sinus, ethmoid sinus and sphenoid sinus. We discussed options of modifying his medication versus considering immunotherapy versus repeat endoscopic sinus surgery.  He wished to try medication management as he seems to be improving.    Oscar Urrutia MD  4/26/2017  9:51 AM

## 2017-05-09 DIAGNOSIS — J30.89 PERENNIAL ALLERGIC RHINITIS, UNSPECIFIED ALLERGIC RHINITIS TRIGGER: ICD-10-CM

## 2017-05-09 DIAGNOSIS — J32.9 CHRONIC SINUSITIS, UNSPECIFIED LOCATION: ICD-10-CM

## 2017-06-05 ENCOUNTER — TELEPHONE (OUTPATIENT)
Dept: OTOLARYNGOLOGY | Facility: CLINIC | Age: 52
End: 2017-06-05

## 2017-06-05 RX ORDER — AMOXICILLIN AND CLAVULANATE POTASSIUM 875; 125 MG/1; MG/1
1 TABLET, FILM COATED ORAL EVERY 12 HOURS
Qty: 20 TABLET | Refills: 0 | Status: SHIPPED | OUTPATIENT
Start: 2017-06-05 | End: 2017-06-15

## 2017-06-05 RX ORDER — METHYLPREDNISOLONE 4 MG/1
TABLET ORAL
Qty: 1 EACH | Refills: 0 | Status: SHIPPED | OUTPATIENT
Start: 2017-06-05 | End: 2017-06-11

## 2017-06-05 NOTE — TELEPHONE ENCOUNTER
Pt has a sinus infection with severe ringing in the ears from it. Ok to send in abx and steroid per [place]

## 2017-06-12 RX ORDER — CETIRIZINE HYDROCHLORIDE 10 MG/1
10 TABLET ORAL DAILY
Qty: 90 TABLET | Refills: 2 | Status: SHIPPED | OUTPATIENT
Start: 2017-06-12 | End: 2018-02-18 | Stop reason: SDUPTHER

## 2017-06-12 RX ORDER — FLUTICASONE PROPIONATE 50 MCG
2 SPRAY, SUSPENSION (ML) NASAL DAILY
Qty: 3 EACH | Refills: 2 | Status: SHIPPED | OUTPATIENT
Start: 2017-06-12 | End: 2018-02-18 | Stop reason: SDUPTHER

## 2017-06-12 RX ORDER — AZELASTINE HYDROCHLORIDE 0.5 MG/ML
1 SOLUTION/ DROPS OPHTHALMIC 2 TIMES DAILY
Qty: 3 EACH | Refills: 2 | Status: SHIPPED | OUTPATIENT
Start: 2017-06-12 | End: 2017-09-10

## 2017-06-27 ENCOUNTER — TELEPHONE (OUTPATIENT)
Dept: OTOLARYNGOLOGY | Facility: CLINIC | Age: 52
End: 2017-06-27

## 2017-06-27 RX ORDER — AMOXICILLIN AND CLAVULANATE POTASSIUM 875; 125 MG/1; MG/1
1 TABLET, FILM COATED ORAL EVERY 12 HOURS
Qty: 20 TABLET | Refills: 0 | Status: SHIPPED | OUTPATIENT
Start: 2017-06-27 | End: 2017-07-07

## 2017-06-27 NOTE — TELEPHONE ENCOUNTER
Pt called stating he has another infection. I have talked to [facility] and moved up his appt, per [place] went ahead and gave one more round of abx.

## 2017-07-13 DIAGNOSIS — J40 BRONCHITIS: ICD-10-CM

## 2017-07-13 DIAGNOSIS — R05.9 COUGH: ICD-10-CM

## 2017-07-26 ENCOUNTER — OFFICE VISIT (OUTPATIENT)
Dept: OTOLARYNGOLOGY | Facility: CLINIC | Age: 52
End: 2017-07-26

## 2017-07-26 VITALS
SYSTOLIC BLOOD PRESSURE: 140 MMHG | HEART RATE: 76 BPM | HEIGHT: 69 IN | WEIGHT: 184.13 LBS | DIASTOLIC BLOOD PRESSURE: 80 MMHG | BODY MASS INDEX: 27.27 KG/M2 | TEMPERATURE: 97.8 F

## 2017-07-26 DIAGNOSIS — J32.9 CHRONIC SINUSITIS, UNSPECIFIED LOCATION: ICD-10-CM

## 2017-07-26 DIAGNOSIS — J30.89 PERENNIAL ALLERGIC RHINITIS, UNSPECIFIED ALLERGIC RHINITIS TRIGGER: Primary | ICD-10-CM

## 2017-07-26 DIAGNOSIS — J31.0 CHRONIC RHINITIS: ICD-10-CM

## 2017-07-26 PROCEDURE — 99213 OFFICE O/P EST LOW 20 MIN: CPT | Performed by: OTOLARYNGOLOGY

## 2017-07-26 RX ORDER — MONTELUKAST SODIUM 10 MG/1
10 TABLET ORAL DAILY
Qty: 30 TABLET | Refills: 3 | Status: SHIPPED | OUTPATIENT
Start: 2017-07-26 | End: 2017-08-25

## 2017-07-26 NOTE — PROGRESS NOTES
Patient Care Team:  Aquilino Pedersen MD as PCP - General  Aquilino Pedersen MD as PCP - Family Medicine  Oscar Urrutia MD as Consulting Physician (Otolaryngology)  TAYLOR Stark as Physician Assistant (Otolaryngology)    Chief Complaint   Patient presents with   • Follow-up     Perennial allergic rhinitis, unspecified allergic rhinitis trigger       Subjective   HPI   Selvin Garcia is a  51 y.o. male who presents for follow up. He has had stable complaints. He has had some sneezing fit spells that are intermittent and severe in nature.     Review of Systems:  Reviewed per patient intake note    Past History:  Past Medical History:   Diagnosis Date   • Abnormal CT of paranasal sinuses 08/20/2015    (Note 1 of 3)  NASAL SEPTUM:  The nasal septum is relatively midline.  MAXILLARY SINUSES:  The left maxillary sinus showed >5 m mucosal thickening and 50% opacification and the right maxillary sinus showed >5 mm mucosal thickening. The osteomeatal complex is obstructed bilaterally.   • Abnormal CT of paranasal sinuses 08/20/2015    (Note 2 of 3)  NASAL TURBINATES: The inferior turbinates showed bilateral hypertrophy. The Cherie bullosa are not present.  ETHMOID SINUSES: The left ethmoid sinus showed >3 mm mucosal thickening and complete opacification. The lamina papyracea appears intact bilaterally.   • Abnormal CT of paranasal sinuses 08/20/2015    (Note 3 of 3)  FRONTAL SINUSES:  The left frontal sinus showed complete opacification. The right frontal sinus showed complete opacification. SPENOID SINUSES: The left sphenoid sinus showed moderate mucosal thickening. The right sphenoid sinus showed moderate mucosal thickening.   • Allergic rhinitis 12/01/2015   • Chronic rhinitis    • Chronic sinusitis    • Disturbances of sensation of smell and taste 12/01/2015   • Sensorineural hearing loss, bilateral 12/01/2015   • Sleep apnea    • Subjective tinnitus 12/01/2015     Past Surgical History:    Procedure Laterality Date   • FUNCTIONAL ENDOSCOPIC SINUS SURGERY  07/26/2016   • OTHER SURGICAL HISTORY      Open Reduction-Internal Fixation   • SEPTOPLASTY      Per Dr. Urrutia 7-8 years ago     Family History   Problem Relation Age of Onset   • Diabetes Other      Social History   Substance Use Topics   • Smoking status: Never Smoker   • Smokeless tobacco: Never Used   • Alcohol use Yes      Comment: Current some day - 1 week       Current Outpatient Prescriptions:   •  azelastine (OPTIVAR) 0.05 % ophthalmic solution, Administer 1 drop to both eyes 2 (Two) Times a Day for 90 days., Disp: 3 each, Rfl: 2  •  cetirizine (zyrTEC) 10 MG tablet, Take 1 tablet by mouth Daily for 90 days., Disp: 90 tablet, Rfl: 2  •  fluticasone (FLONASE) 50 MCG/ACT nasal spray, 2 sprays into each nostril Daily for 90 days., Disp: 3 each, Rfl: 2  •  PROAIR  (90 BASE) MCG/ACT inhaler, USE 2 INHALATIONS EVERY 4 HOURS AS NEEDED FOR WHEEZING, Disp: 25.5 g, Rfl: 1  •  montelukast (SINGULAIR) 10 MG tablet, Take 1 tablet by mouth Daily for 30 days., Disp: 30 tablet, Rfl: 3  •  QVAR 80 MCG/ACT inhaler, , Disp: , Rfl:   Allergies:  Lexapro [escitalopram oxalate] and Ultram [tramadol hcl]    Objective     Vital Signs:  Temp:  [97.8 °F (36.6 °C)] 97.8 °F (36.6 °C)  Heart Rate:  [76] 76  BP: (140)/(80) 140/80    Physical Exam  CONSTITUTIONAL: well nourished, well-developed, alert, oriented, in no acute distress   COMMUNICATION AND VOICE: able to communicate normally for age, normal voice quality  HEAD: normocephalic, no lesions, atraumatic, no tenderness, no masses   FACE: appearance normal, no lesions, no tenderness, no deformities, facial motion symmetric  EYES: ocular motility normal, eyelids normal, orbits normal, no proptosis, conjunctiva normal , pupils equal, round   EARS:  Hearing: response to conversational voice normal bilaterally   External Ears: auricles without lesions  NOSE:  External Nose: structure normal, no tenderness on  palpation, no nasal discharge, no lesions, no evidence of trauma, nostrils patent   Intranasal exam: nasal mucosa with mucosal congestion and erythema, nasal septum relatively midline   ORAL:  Lips: upper and lower lips without lesion   NECK: neck appearance normal  CHEST/RESPIRATORY: respiratory effort normal, normal chest excursion  CARDIOVASCULAR: extremities without cyanosis or edema   NEUROLOGIC/PSYCHIATRIC: oriented appropriately, mood normal, affect appropriate, CN II-XII intact grossly          Assessment   1. Perennial allergic rhinitis, unspecified allergic rhinitis trigger    2. Chronic sinusitis, unspecified location    3. Chronic rhinitis        Plan   Medical and surgical options were discussed including continued medial management, reviewion functional endoscopic sinus surgery and immunotherapy. Risks, benefits and alternatives were discussed and questions were answered. After considering the options, the patient decided to proceed with continued medical management.    Return in about 4 months (around 11/26/2017).    Oscar Urrutia MD  07/26/17  10:03 AM

## 2017-07-26 NOTE — PROGRESS NOTES
Patient Intake Note    Review of Systems  Review of Systems   Constitutional: Negative for chills, fatigue and fever.   HENT:        See HPI   Respiratory: Positive for wheezing. Negative for cough, choking and shortness of breath.    Cardiovascular: Negative.    Gastrointestinal: Negative for constipation, diarrhea, nausea and vomiting.   Allergic/Immunologic: Positive for environmental allergies. Negative for food allergies.   Neurological: Positive for headaches (due to sinus issues). Negative for dizziness and light-headedness.   Hematological: Does not bruise/bleed easily.   Psychiatric/Behavioral: Positive for sleep disturbance.         Jaja Davidson  7/26/2017  9:27 AM

## 2017-09-05 ENCOUNTER — OFFICE VISIT (OUTPATIENT)
Dept: FAMILY MEDICINE CLINIC | Facility: CLINIC | Age: 52
End: 2017-09-05

## 2017-09-05 VITALS
HEART RATE: 80 BPM | HEIGHT: 69 IN | DIASTOLIC BLOOD PRESSURE: 78 MMHG | TEMPERATURE: 98.7 F | SYSTOLIC BLOOD PRESSURE: 120 MMHG | BODY MASS INDEX: 27.11 KG/M2 | OXYGEN SATURATION: 96 % | RESPIRATION RATE: 18 BRPM | WEIGHT: 183 LBS

## 2017-09-05 DIAGNOSIS — J30.89 PERENNIAL ALLERGIC RHINITIS, UNSPECIFIED ALLERGIC RHINITIS TRIGGER: ICD-10-CM

## 2017-09-05 DIAGNOSIS — Z02.89 ENCOUNTER FOR EXAMINATION REQUIRED BY DEPARTMENT OF TRANSPORTATION (DOT): Primary | ICD-10-CM

## 2017-09-05 DIAGNOSIS — J45.902 ASTHMA WITH STATUS ASTHMATICUS, UNSPECIFIED ASTHMA SEVERITY: ICD-10-CM

## 2017-09-05 PROBLEM — Z01.89 LABORATORY TEST: Status: ACTIVE | Noted: 2017-09-05

## 2017-09-05 PROBLEM — J45.909 ASTHMA: Status: ACTIVE | Noted: 2017-09-05

## 2017-09-05 PROCEDURE — DOTPHY: Performed by: FAMILY MEDICINE

## 2017-09-05 RX ORDER — MONTELUKAST SODIUM 10 MG/1
10 TABLET ORAL NIGHTLY
COMMUNITY
End: 2017-10-09 | Stop reason: SDUPTHER

## 2017-10-09 RX ORDER — MONTELUKAST SODIUM 10 MG/1
10 TABLET ORAL NIGHTLY
Qty: 90 TABLET | Refills: 1 | Status: SHIPPED | OUTPATIENT
Start: 2017-10-09 | End: 2018-01-07

## 2017-10-19 RX ORDER — CETIRIZINE HYDROCHLORIDE 10 MG/1
10 TABLET ORAL DAILY PRN
Qty: 90 TABLET | Refills: 2 | COMMUNITY
End: 2018-02-19 | Stop reason: SDUPTHER

## 2017-11-20 ENCOUNTER — OFFICE VISIT (OUTPATIENT)
Dept: OTOLARYNGOLOGY | Facility: CLINIC | Age: 52
End: 2017-11-20

## 2017-11-20 VITALS
BODY MASS INDEX: 27.67 KG/M2 | SYSTOLIC BLOOD PRESSURE: 128 MMHG | HEIGHT: 69 IN | WEIGHT: 186.8 LBS | DIASTOLIC BLOOD PRESSURE: 74 MMHG | TEMPERATURE: 97.7 F

## 2017-11-20 DIAGNOSIS — J01.91 ACUTE RECURRENT SINUSITIS, UNSPECIFIED LOCATION: ICD-10-CM

## 2017-11-20 DIAGNOSIS — J30.89 CHRONIC NON-SEASONAL ALLERGIC RHINITIS, UNSPECIFIED TRIGGER: Primary | ICD-10-CM

## 2017-11-20 DIAGNOSIS — J32.9 CHRONIC SINUSITIS, UNSPECIFIED LOCATION: ICD-10-CM

## 2017-11-20 PROCEDURE — 1036F TOBACCO NON-USER: CPT | Performed by: OTOLARYNGOLOGY

## 2017-11-20 PROCEDURE — 99214 OFFICE O/P EST MOD 30 MIN: CPT | Performed by: OTOLARYNGOLOGY

## 2017-11-20 RX ORDER — AMOXICILLIN AND CLAVULANATE POTASSIUM 875; 125 MG/1; MG/1
1 TABLET, FILM COATED ORAL 2 TIMES DAILY
Qty: 20 TABLET | Refills: 0 | Status: SHIPPED | OUTPATIENT
Start: 2017-11-20 | End: 2017-11-30

## 2017-11-20 RX ORDER — MELOXICAM 15 MG/1
15 TABLET ORAL AS NEEDED
COMMUNITY
End: 2018-09-19

## 2017-11-20 RX ORDER — FLUTICASONE PROPIONATE 50 MCG
2 SPRAY, SUSPENSION (ML) NASAL DAILY
Qty: 1 BOTTLE | Refills: 6 | Status: SHIPPED | OUTPATIENT
Start: 2017-11-20 | End: 2020-09-04

## 2017-11-20 NOTE — PATIENT INSTRUCTIONS
For the best response, use your nasal sprays every day without skipping doses. It may take several weeks before the full effect is acheived.    Use Afrin spray, neosynephrine or other decongestant spray for 3 days. After using, use a Conchis pot or Neilmed Sinus Rinse or similar nasal irrigation device. Do not use the decongestant spray for more than 3 days or you may develop a rebound rhinitis/ nasal congestion. Make sure you boil the water before mixing the saline and let the mix cool to a comfortable temperature before using.   MyPlate from [employer]  The general, healthful diet is based on the 2010 Dietary Guidelines for Americans. The amount of food you need to eat from each food group depends on your age, sex, and level of physical activity and can be individualized by a dietitian. Go to ChooseMyPlate.gov for more information.  WHAT DO I NEED TO KNOW ABOUT THE MYPLATE PLAN?  · Enjoy your food, but eat less.    · Avoid oversized portions.      ½ of your plate should include fruits and vegetables.    ¼ of your plate should be grains.    ¼ of your plate should be protein.  Grains  · Make at least half of your grains whole grains.  · For a 2,000 calorie daily food plan, eat 6 oz every day.  · 1 oz is about 1 slice bread, 1 cup cereal, or ½ cup cooked rice, cereal, or pasta.  Vegetables  · Make half your plate fruits and vegetables.  · For a 2,000 calorie daily food plan, eat 2½ cups every day.  · 1 cup is about 1 cup raw or cooked vegetables or vegetable juice or 2 cups raw leafy greens.  Fruits  · Make half your plate fruits and vegetables.  · For a 2,000 calorie daily food plan, eat 2 cups every day.  · 1 cup is about 1 cup fruit or 100% fruit juice or ½ cup dried fruit.  Protein  · For a 2,000 calorie daily food plan, eat 5½ oz every day.  · 1 oz is about 1 oz meat, poultry, or fish, ¼ cup cooked beans, 1 egg, 1 Tbsp peanut butter, or ½ oz nuts or seeds.  Dairy  · Switch to fat-free or low-fat (1%) milk.  · For a  2,000 calorie daily food plan, eat 3 cups every day.  · 1 cup is about 1 cup milk or yogurt or soy milk (soy beverage), 1½ oz natural cheese, or 2 oz processed cheese.  Fats, Oils, and Empty Calories  · Only small amounts of oils are recommended.  · Empty calories are calories from solid fats or added sugars.  · Compare sodium in foods like soup, bread, and frozen meals. Choose the foods with lower numbers.  · Drink water instead of sugary drinks.  WHAT FOODS CAN I EAT?  Grains  Whole grains such as whole wheat, quinoa, millet, and bulgur. Bread, rolls, and pasta made from whole grains. Brown or wild rice. Hot or cold cereals made from whole grains and without added sugar.  Vegetables  All fresh vegetables, especially fresh red, dark green, or orange vegetables. Peas and beans. Low-sodium frozen or canned vegetables prepared without added salt. Low-sodium vegetable juices.  Fruits  All fresh, frozen, and dried fruits. Canned fruit packed in water or fruit juice without added sugar. Fruit juices without added sugar.  Meats and Other Protein Sources  Boiled, baked, or grilled lean meat trimmed of fat. Skinless poultry. Fresh seafood and shellfish. Canned seafood packed in water. Unsalted nuts and unsalted nut butters. Tofu. Dried beans and pea. Eggs.  Dairy  Low-fat or fat-free milk, yogurt, and cheeses.   Sweets and Desserts  Frozen desserts made from low-fat milk.  Fats and Oils  Olive, peanut, and canola oils and margarine. Salad dressing and mayonnaise made from these oils.  Other  Soups and casseroles made from allowed ingredients and without added fat or salt.  The items listed above may not be a complete list of recommended foods or beverages. Contact your dietitian for more options.   WHAT FOODS ARE NOT RECOMMENDED?  Grains  Sweetened, low-fiber cereals. Packaged baked goods. Snack crackers and chips. Cheese crackers, butter crackers, and biscuits. Frozen waffles, sweet breads, doughnuts, pastries, packaged  baking mixes, pancakes, cakes, and cookies.  Vegetables  Regular canned or frozen vegetables or vegetables prepared with salt. Canned tomatoes. Canned tomato sauce. Fried vegetables. Vegetables in cream sauce or cheese sauce.  Fruits  Fruits packed in syrup or made with added sugar.   Meats and Other Protein Sources  Marbled or fatty meats such as ribs. Poultry with skin. Fried meats, poultry, eggs, or fish. Sausages, hot dogs, and deli meats such as pastrami, bologna, or salami.  Dairy  Whole milk, cream, cheeses made from whole milk, sour cream. Ice cream or yogurt made from whole milk or with added sugar.  Beverages  For adults, no more than one alcoholic drink per day. Regular soft drinks or other sugary beverages. Juice drinks.  Sweets and Desserts  Sugary or fatty desserts, candy, and other sweets.  Fats and Oils  Solid shortening or partially hydrogenated oils. Solid margarine. Margarine that contains trans fats. Butter.  The items listed above may not be a complete list of foods and beverages to avoid. Contact your dietitian for more information.     This information is not intended to replace advice given to you by your health care provider. Make sure you discuss any questions you have with your health care provider.     Document Released: 01/06/2009 Document Revised: 01/08/2016 Document Reviewed: 11/26/2014  [employer] Interactive Patient Education ©2017 [employer]   Calorie Counting for Weight Loss  Calories are energy you get from the things you eat and drink. Your body uses this energy to keep you going throughout the day. The number of calories you eat affects your weight. When you eat more calories than your body needs, your body stores the extra calories as fat. When you eat fewer calories than your body needs, your body burns fat to get the energy it needs.  Calorie counting means keeping track of how many calories you eat and drink each day. If you make sure to eat fewer calories than your body  needs, you should lose weight. In order for calorie counting to work, you will need to eat the number of calories that are right for you in a day to lose a healthy amount of weight per week. A healthy amount of weight to lose per week is usually 1-2 lb (0.5-0.9 kg). A dietitian can determine how many calories you need in a day and give you suggestions on how to reach your calorie goal.   WHAT IS MY MY PLAN?  My goal is to have __________ calories per day.   If I have this many calories per day, I should lose around __________ pounds per week.  WHAT DO I NEED TO KNOW ABOUT CALORIE COUNTING?  In order to meet your daily calorie goal, you will need to:  · Find out how many calories are in each food you would like to eat. Try to do this before you eat.  · Decide how much of the food you can eat.  · Write down what you ate and how many calories it had. Doing this is called keeping a food log.  WHERE DO I FIND CALORIE INFORMATION?  The number of calories in a food can be found on a Nutrition Facts label. Note that all the information on a label is based on a specific serving of the food. If a food does not have a Nutrition Facts label, try to look up the calories online or ask your dietitian for help.  HOW DO I DECIDE HOW MUCH TO EAT?  To decide how much of the food you can eat, you will need to consider both the number of calories in one serving and the size of one serving. This information can be found on the Nutrition Facts label. If a food does not have a Nutrition Facts label, look up the information online or ask your dietitian for help.  Remember that calories are listed per serving. If you choose to have more than one serving of a food, you will have to multiply the calories per serving by the amount of servings you plan to eat. For example, the label on a package of bread might say that a serving size is 1 slice and that there are 90 calories in a serving. If you eat 1 slice, you will have eaten 90 calories. If  you eat 2 slices, you will have eaten 180 calories.  HOW DO I KEEP A FOOD LOG?  After each meal, record the following information in your food log:  · What you ate.  · How much of it you ate.  · How many calories it had.  · Then, add up your calories.  Keep your food log near you, such as in a small notebook in your pocket. Another option is to use a mobile john or website. Some programs will calculate calories for you and show you how many calories you have left each time you add an item to the log.  WHAT ARE SOME CALORIE COUNTING TIPS?  · Use your calories on foods and drinks that will fill you up and not leave you hungry. Some examples of this include foods like nuts and nut butters, vegetables, lean proteins, and high-fiber foods (more than 5 g fiber per serving).  · Eat nutritious foods and avoid empty calories. Empty calories are calories you get from foods or beverages that do not have many nutrients, such as candy and soda. It is better to have a nutritious high-calorie food (such as an avocado) than a food with few nutrients (such as a bag of chips).  · Know how many calories are in the foods you eat most often. This way, you do not have to look up how many calories they have each time you eat them.  · Look out for foods that may seem like low-calorie foods but are really high-calorie foods, such as baked goods, soda, and fat-free candy.  · Pay attention to calories in drinks. Drinks such as sodas, specialty coffee drinks, alcohol, and juices have a lot of calories yet do not fill you up. Choose low-calorie drinks like water and diet drinks.  · Focus your calorie counting efforts on higher calorie items. Logging the calories in a garden salad that contains only vegetables is less important than calculating the calories in a milk shake.  · Find a way of tracking calories that works for you. Get creative. Most people who are successful find ways to keep track of how much they eat in a day, even if they do not  count every calorie.  WHAT ARE SOME PORTION CONTROL TIPS?  · Know how many calories are in a serving. This will help you know how many servings of a certain food you can have.  · Use a measuring cup to measure serving sizes. This is helpful when you start out. With time, you will be able to estimate serving sizes for some foods.  · Take some time to put servings of different foods on your favorite plates, bowls, and cups so you know what a serving looks like.  · Try not to eat straight from a bag or box. Doing this can lead to overeating. Put the amount you would like to eat in a cup or on a plate to make sure you are eating the right portion.  · Use smaller plates, glasses, and bowls to prevent overeating. This is a quick and easy way to practice portion control. If your plate is smaller, less food can fit on it.  · Try not to multitask while eating, such as watching TV or using your computer. If it is time to eat, sit down at a table and enjoy your food. Doing this will help you to start recognizing when you are full. It will also make you more aware of what and how much you are eating.  HOW CAN I CALORIE COUNT WHEN EATING OUT?  · Ask for smaller portion sizes or child-sized portions.  · Consider sharing an entree and sides instead of getting your own entree.  · If you get your own entree, eat only half. Ask for a box at the beginning of your meal and put the rest of your entree in it so you are not tempted to eat it.  · Look for the calories on the menu. If calories are listed, choose the lower calorie options.  · Choose dishes that include vegetables, fruits, whole grains, low-fat dairy products, and lean protein. Focusing on smart food choices from each of the 5 food groups can help you stay on track at restaurants.  · Choose items that are boiled, broiled, grilled, or steamed.  · Choose water, milk, unsweetened iced tea, or other drinks without added sugars. If you want an alcoholic beverage, choose a lower  "calorie option. For example, a regular stephanie can have up to 700 calories and a glass of wine has around 150.  · Stay away from items that are buttered, battered, fried, or served with cream sauce. Items labeled \"crispy\" are usually fried, unless stated otherwise.  · Ask for dressings, sauces, and syrups on the side. These are usually very high in calories, so do not eat much of them.  · Watch out for salads. Many people think salads are a healthy option, but this is often not the case. Many salads come with chavez, fried chicken, lots of cheese, fried chips, and dressing. All of these items have a lot of calories. If you want a salad, choose a garden salad and ask for grilled meats or steak. Ask for the dressing on the side, or ask for olive oil and vinegar or lemon to use as dressing.  · Estimate how many servings of a food you are given. For example, a serving of cooked rice is ½ cup or about the size of half a tennis ball or one cupcake wrapper. Knowing serving sizes will help you be aware of how much food you are eating at restaurants. The list below tells you how big or small some common portion sizes are based on everyday objects.    1 oz--4 stacked dice.    3 oz--1 deck of cards.    1 tsp--1 dice.    1 Tbsp--½ a Ping-Pong ball.    2 Tbsp--1 Ping-Pong ball.    ½ cup--1 tennis ball or 1 cupcake wrapper.    1 cup--1 baseball.     This information is not intended to replace advice given to you by your health care provider. Make sure you discuss any questions you have with your health care provider.     Document Released: 12/18/2006 Document Revised: 01/08/2016 Document Reviewed: 10/23/2014  [employer] Interactive Patient Education ©2017 [employer]     Exercising to Lose Weight  Exercising can help you to lose weight. In order to lose weight through exercise, you need to do vigorous-intensity exercise. You can tell that you are exercising with vigorous intensity if you are breathing very hard and fast and " cannot hold a conversation while exercising.  Moderate-intensity exercise helps to maintain your current weight. You can tell that you are exercising at a moderate level if you have a higher heart rate and faster breathing, but you are still able to hold a conversation.  HOW OFTEN SHOULD I EXERCISE?  Choose an activity that you enjoy and set realistic goals. Your health care provider can help you to make an activity plan that works for you. Exercise regularly as directed by your health care provider. This may include:  · Doing resistance training twice each week, such as:    Push-ups.    Sit-ups.    Lifting weights.    Using resistance bands.  · Doing a given intensity of exercise for a given amount of time. Choose from these options:    150 minutes of moderate-intensity exercise every week.    75 minutes of vigorous-intensity exercise every week.    A mix of moderate-intensity and vigorous-intensity exercise every week.  Children, pregnant women, people who are out of shape, people who are overweight, and older adults may need to consult a health care provider for individual recommendations. If you have any sort of medical condition, be sure to consult your health care provider before starting a new exercise program.  WHAT ARE SOME ACTIVITIES THAT CAN HELP ME TO LOSE WEIGHT?   · Walking at a rate of at least 4.5 miles an hour.  · Jogging or running at a rate of 5 miles per hour.  · Biking at a rate of at least 10 miles per hour.  · Lap swimming.  · Roller-skating or in-line skating.  · Cross-country skiing.  · Vigorous competitive sports, such as football, basketball, and soccer.  · Jumping rope.  · Aerobic dancing.  HOW CAN I BE MORE ACTIVE IN MY DAY-TO-DAY ACTIVITIES?  · Use the stairs instead of the elevator.  · Take a walk during your lunch break.  · If you drive, park your car farther away from work or school.  · If you take public transportation, get off one stop early and walk the rest of the way.  · Make  all of your phone calls while standing up and walking around.  · Get up, stretch, and walk around every 30 minutes throughout the day.  WHAT GUIDELINES SHOULD I FOLLOW WHILE EXERCISING?  · Do not exercise so much that you hurt yourself, feel dizzy, or get very short of breath.  · Consult your health care provider prior to starting a new exercise program.  · Wear comfortable clothes and shoes with good support.  · Drink plenty of water while you exercise to prevent dehydration or heat stroke. Body water is lost during exercise and must be replaced.  · Work out until you breathe faster and your heart beats faster.     This information is not intended to replace advice given to you by your health care provider. Make sure you discuss any questions you have with your health care provider.     Document Released: 01/20/2012 Document Revised: 01/08/2016 Document Reviewed: 05/21/2015  [employer] Interactive Patient Education ©2017 [employer]

## 2017-11-20 NOTE — PROGRESS NOTES
Patient Care Team:  Aquilino Pedersen MD as PCP - General  Aquilino Pedersen MD as PCP - Family Medicine  Oscar Urrutia MD as Consulting Physician (Otolaryngology)  TAYLOR Stark as Physician Assistant (Otolaryngology)    Chief Complaint   Patient presents with   • Sinus Problem     drainage   • Cough   • Headache   • Sore Throat       Subjective   HPI   Selvin Garcia is a  51 y.o. male who presents for follow up of allergy and sinus complaints.  He has had a recent flair up of nasal drainage, nasal congestion and sinusitis. The symptoms are localized to both nasal cavities. He has had moderate symptoms. The symptoms have been present for the last 1 week There have been no identified factors that aggravate the symptoms.. There have been no factors that have improved the symptoms.  He denies  fever.    Review of Systems:  Reviewed per patient intake note    Past History:  Past Medical History:   Diagnosis Date   • Abnormal CT of paranasal sinuses 08/20/2015    (Note 1 of 3)  NASAL SEPTUM:  The nasal septum is relatively midline.  MAXILLARY SINUSES:  The left maxillary sinus showed >5 m mucosal thickening and 50% opacification and the right maxillary sinus showed >5 mm mucosal thickening. The osteomeatal complex is obstructed bilaterally.   • Abnormal CT of paranasal sinuses 08/20/2015    (Note 2 of 3)  NASAL TURBINATES: The inferior turbinates showed bilateral hypertrophy. The Cherie bullosa are not present.  ETHMOID SINUSES: The left ethmoid sinus showed >3 mm mucosal thickening and complete opacification. The lamina papyracea appears intact bilaterally.   • Abnormal CT of paranasal sinuses 08/20/2015    (Note 3 of 3)  FRONTAL SINUSES:  The left frontal sinus showed complete opacification. The right frontal sinus showed complete opacification. SPENOID SINUSES: The left sphenoid sinus showed moderate mucosal thickening. The right sphenoid sinus showed moderate mucosal thickening.   •  Allergic rhinitis 12/01/2015   • Chronic rhinitis    • Chronic sinusitis    • Disturbances of sensation of smell and taste 12/01/2015   • Sensorineural hearing loss, bilateral 12/01/2015   • Sleep apnea    • Subjective tinnitus 12/01/2015     Past Surgical History:   Procedure Laterality Date   • FUNCTIONAL ENDOSCOPIC SINUS SURGERY  07/26/2016   • OTHER SURGICAL HISTORY      Open Reduction-Internal Fixation   • SEPTOPLASTY      Per Dr. Urrutia 7-8 years ago     Family History   Problem Relation Age of Onset   • Diabetes Other      Social History   Substance Use Topics   • Smoking status: Never Smoker   • Smokeless tobacco: Never Used   • Alcohol use Yes      Comment: Current some day - 1 week     Current Outpatient Prescriptions on File Prior to Visit   Medication Sig   • cetirizine (zyrTEC) 10 MG tablet Take 1 tablet by mouth Daily As Needed for Allergies.   • montelukast (SINGULAIR) 10 MG tablet Take 1 tablet by mouth Every Night for 90 days.   • PROAIR  (90 BASE) MCG/ACT inhaler USE 2 INHALATIONS EVERY 4 HOURS AS NEEDED FOR WHEEZING   • QVAR 80 MCG/ACT inhaler      No current facility-administered medications on file prior to visit.      Allergies:  Lexapro [escitalopram oxalate] and Ultram [tramadol hcl]    Objective      Vital Signs:   Temp:  [97.7 °F (36.5 °C)] 97.7 °F (36.5 °C)  BP: (128)/(74) 128/74    Physical Exam   CONSTITUTIONAL: well nourished, well-developed, alert, oriented, in no acute distress   COMMUNICATION AND VOICE: able to communicate normally, normal voice quality  HEAD: normocephalic, no lesions, atraumatic, no tenderness, no masses   FACE: appearance normal, no lesions, no tenderness, no deformities, facial motion symmetric  SALIVARY GLANDS: parotid glands with no tenderness, no swelling, no masses, submandibular glands with normal size, nontender  EYES: ocular motility normal, eyelids normal, orbits normal, no proptosis, conjunctiva normal , pupils equal, round  HEARING: response to  conversational voice normal bilaterally   EXTERNAL EARS: auricles without lesions  EXTERNAL EAR CANALS: normal ear canals without stenosis or significant cerumen  TYMPANIC MEMBRANES: tympanic membrane appearance normal, no lesions, no perforation, normal mobility, no fluid  EXTERNAL NOSE: structure normal, no tenderness on palpation, no nasal discharge, no lesions, no evidence of trauma, nostrils patent  INTRANASAL EXAM: nasal mucosa with mucosal congestion and erythema, nasal septum without overtly obstructing anterior deviation  LIPS: structure normal, no tenderness on palpation, no lesions, no evidence of trauma  TEETH: dentition within normal limits for age  GUMS: gingivae healthy  ORAL MUCOSA: oral mucosa normal, no mucosal lesions  FLOOR OF MOUTH: Warthin’s duct patent, mucosa normal  TONGUE: lingual mucosa normal without lesions, normal tongue mobility  PALATE: soft and hard palates with normal mucosa and structure  OROPHARYNX: oropharyngeal mucosa normal, tonsil fossa with normal in appearance  NECK: neck appearance normal, no masses or tenderness  THYROID: no overt thyromegaly, no tenderness, nodules or mass present on palpation, position midline   LYMPH NODES: no lymphadenopathy  CHEST/RESPIRATORY: no tachypnea, retractions, or cyanosis, BREATH SOUNDS: clear to auscultation, no wheezes, rales, or rhonchi, moderate expiratory wheezing heard diffusely throughout both lungs  CARDIOVASCULAR: extremities without cyanosis or edema, no overt jugulovenous distension present  NEUROLOGIC/PSYCHIATRIC: oriented appropriately for age, mood normal, affect appropriate, cranial nerves intact grossly unless specifically mentioned above         Assessment   1. Chronic non-seasonal allergic rhinitis, unspecified trigger    2. Chronic sinusitis, unspecified location    3. Acute recurrent sinusitis, unspecified location        Plan    Continue current management plan.     -------MEDICATIONS:-------  New Medications Ordered  This Visit   Medications   • fluticasone (FLONASE) 50 MCG/ACT nasal spray     Si sprays into each nostril Daily for 30 days. Administer 2 sprays in each nostril for each dose.     Dispense:  1 bottle     Refill:  6   • amoxicillin-clavulanate (AUGMENTIN) 875-125 MG per tablet     Sig: Take 1 tablet by mouth 2 (Two) Times a Day for 10 days.     Dispense:  20 tablet     Refill:  0        -----INSTRUCTIONS-----  For the best response, use your nasal sprays every day without skipping doses. It may take several weeks before the full effect is acheived.    Use Afrin spray, neosynephrine or other decongestant spray for 3 days. After using, use a Conchis pot or Neilmed Sinus Rinse or similar nasal irrigation device. Do not use the decongestant spray for more than 3 days or you may develop a rebound rhinitis/ nasal congestion. Make sure you boil the water before mixing the saline and let the mix cool to a comfortable temperature before using.     QUALITY MEASURES   Body Mass Index Screening and Follow-Up Plan   Body mass index is 27.59 kg/(m^2).  Diet and generalized activity/ exersize handouts given on AVS.    Tobacco Use: Screening and Cessation Intervention     Smoking status: Never Smoker                                                              Smokeless status: Never Used                            Return in about 4 months (around 3/20/2018).    Oscar Urrutia MD  17  10:51 AM

## 2017-11-20 NOTE — PROGRESS NOTES
Patient Intake Note    Review of Systems  Review of Systems   Constitutional: Positive for fatigue. Negative for chills and fever.   HENT:        See HPI   Respiratory: Positive for cough and wheezing. Negative for choking and shortness of breath.    Cardiovascular: Negative.    Gastrointestinal: Negative for constipation, diarrhea, nausea and vomiting.   Allergic/Immunologic: Positive for environmental allergies. Negative for food allergies.   Neurological: Positive for headaches (sinus headache). Negative for dizziness and light-headedness.   Hematological: Does not bruise/bleed easily.   Psychiatric/Behavioral: Positive for sleep disturbance (with sinus issues).         Jaja Davidson  11/20/2017  10:01 AM

## 2017-12-05 ENCOUNTER — TELEPHONE (OUTPATIENT)
Dept: OTOLARYNGOLOGY | Facility: CLINIC | Age: 52
End: 2017-12-05

## 2017-12-05 RX ORDER — METHYLPREDNISOLONE 4 MG/1
TABLET ORAL
Qty: 1 EACH | Refills: 0 | Status: SHIPPED | OUTPATIENT
Start: 2017-12-05 | End: 2017-12-11

## 2017-12-05 RX ORDER — CLARITHROMYCIN 500 MG/1
500 TABLET, COATED ORAL 2 TIMES DAILY
Qty: 20 TABLET | Refills: 0 | Status: SHIPPED | OUTPATIENT
Start: 2017-12-05 | End: 2017-12-15

## 2017-12-05 NOTE — TELEPHONE ENCOUNTER
----- Message from Oscar Urrutia MD sent at 12/4/2017  4:56 PM CST -----  Biaxin 500 mg po bid x 10 day and medrol dose pack    JANI Urrutia MD    ----- Message -----     From: Meg Arizmendi MA     Sent: 12/4/2017   1:34 PM       To: Oscar Urrutia MD    You saw him on the 20 th of November.   ----- Message -----     From: Amanda Bajwa     Sent: 12/4/2017   1:07 PM       To: Meg Arizmendi MA    Pt called and does not feel any better since last visit, is asking if there is anything else he can do?  [phone]

## 2018-01-09 DIAGNOSIS — J40 BRONCHITIS: ICD-10-CM

## 2018-01-09 DIAGNOSIS — R05.9 COUGH: ICD-10-CM

## 2018-02-19 RX ORDER — FLUTICASONE PROPIONATE 50 MCG
SPRAY, SUSPENSION (ML) NASAL
Qty: 48 G | Refills: 2 | Status: SHIPPED | OUTPATIENT
Start: 2018-02-19 | End: 2018-11-18 | Stop reason: SDUPTHER

## 2018-02-19 RX ORDER — CETIRIZINE HYDROCHLORIDE 10 MG/1
TABLET ORAL
Qty: 90 TABLET | Refills: 2 | Status: SHIPPED | OUTPATIENT
Start: 2018-02-19 | End: 2018-11-18 | Stop reason: SDUPTHER

## 2018-03-19 NOTE — PROGRESS NOTES
Oscar Urrutia MD     Chief Complaint   Patient presents with   • Follow-up     Chronic non-seasonal allergic rhinitis, unspecified trigger       HPI   Selvin Garcia is a  52 y.o. male who is here for follow up  of allergy and sinus complaints. He was last seen on 11/20/17. He has had improvement of his complaints. The patient has not had complaints of: nasal congestion, drainage, facial swelling, facial pain or sinusitis.    Review of Systems:  Reviewed per patient intake note    Past History:  Past medical and surgical history, family history and social history reviewed and updated when appropriate.  Current medications and allergies reviewed and updated when appropriate.  Allergies:  Lexapro [escitalopram oxalate] and Ultram [tramadol hcl]    Vital Signs:   Temp:  [98.8 °F (37.1 °C)] 98.8 °F (37.1 °C)  BP: (140)/(88) 140/88    Physical Exam   CONSTITUTIONAL: well nourished, well-developed, alert, oriented, in no acute distress   COMMUNICATION AND VOICE: able to communicate normally, normal voice quality  HEAD: normocephalic, no lesions, atraumatic, no tenderness, no masses   FACE: appearance normal, no lesions, no tenderness, no deformities, facial motion symmetric  EYES: ocular motility normal, eyelids normal, orbits normal, no proptosis, conjunctiva normal , pupils equal, round  HEARING: response to conversational voice normal bilaterally   EXTERNAL EARS: auricles without lesions  EXTERNAL NOSE: structure normal, no tenderness on palpation, no nasal discharge, no lesions, no evidence of trauma, nostrils patent  INTRANASAL EXAM: nasal mucosa with mucosal congestion and erythema, nasal septum without overt anterior deviation  LIPS: structure normal, no tenderness on palpation, no lesions, no evidence of trauma  NECK: neck appearance normal  LYMPH NODES: no lymphadenopathy  CHEST/RESPIRATORY: respiratory effort normal  CARDIOVASCULAR: extremities without cyanosis or edema, no overt jugulovenous  distension present  NEUROLOGIC/PSYCHIATRIC: oriented appropriately for age, mood normal, affect appropriate, cranial nerves intact grossly unless specifically mentioned above          Assessment   1. Chronic sinusitis, unspecified location    2. Chronic rhinitis, unspecified type        Plan    Continue current management plan.    Return in about 4 months (around 7/21/2018).    Oscar Urrutia MD  03/21/18  3:47 PM

## 2018-03-21 ENCOUNTER — OFFICE VISIT (OUTPATIENT)
Dept: OTOLARYNGOLOGY | Facility: CLINIC | Age: 53
End: 2018-03-21

## 2018-03-21 VITALS
WEIGHT: 194 LBS | BODY MASS INDEX: 28.73 KG/M2 | DIASTOLIC BLOOD PRESSURE: 88 MMHG | SYSTOLIC BLOOD PRESSURE: 140 MMHG | HEIGHT: 69 IN | TEMPERATURE: 98.8 F

## 2018-03-21 DIAGNOSIS — J32.9 CHRONIC SINUSITIS, UNSPECIFIED LOCATION: Primary | ICD-10-CM

## 2018-03-21 DIAGNOSIS — J31.0 CHRONIC RHINITIS, UNSPECIFIED TYPE: ICD-10-CM

## 2018-03-21 PROCEDURE — 99213 OFFICE O/P EST LOW 20 MIN: CPT | Performed by: OTOLARYNGOLOGY

## 2018-03-21 RX ORDER — MONTELUKAST SODIUM 10 MG/1
TABLET ORAL
COMMUNITY
Start: 2018-03-20 | End: 2018-12-14 | Stop reason: SDUPTHER

## 2018-03-21 NOTE — PROGRESS NOTES
Jaja Davidson   Patient Intake Note    Review of Systems  Review of Systems   Constitutional: Negative for chills, fatigue and fever.   HENT:        See HPI   Respiratory: Positive for wheezing. Negative for cough, choking and shortness of breath.    Cardiovascular: Negative.    Gastrointestinal: Negative for constipation, diarrhea, nausea and vomiting.   Allergic/Immunologic: Positive for environmental allergies. Negative for food allergies.   Neurological: Positive for headaches. Negative for dizziness.   Hematological: Does not bruise/bleed easily.   Psychiatric/Behavioral: Negative for sleep disturbance.       QUALITY MEASURES    Body Mass Index Screening and Follow-Up Plan  Body mass index is 28.65 kg/m².  Discussed the patient's BMI with him. BMI is above normal parameters. Follow-up plan includes:  exercise counseling.    Tobacco Use: Screening and Cessation Intervention  Smoking status: Never Smoker                                                              Smokeless tobacco: Never Used                            Jaja Davidson  3/21/2018  3:21 PM

## 2018-07-09 DIAGNOSIS — J40 BRONCHITIS: ICD-10-CM

## 2018-07-09 DIAGNOSIS — R05.9 COUGH: ICD-10-CM

## 2018-07-09 RX ORDER — ALBUTEROL SULFATE 90 UG/1
2 AEROSOL, METERED RESPIRATORY (INHALATION) EVERY 4 HOURS PRN
Qty: 25.5 G | Refills: 1 | Status: SHIPPED | OUTPATIENT
Start: 2018-07-09 | End: 2019-01-04 | Stop reason: SDUPTHER

## 2018-07-30 ENCOUNTER — OFFICE VISIT (OUTPATIENT)
Dept: FAMILY MEDICINE CLINIC | Facility: CLINIC | Age: 53
End: 2018-07-30

## 2018-07-30 VITALS
BODY MASS INDEX: 27.4 KG/M2 | TEMPERATURE: 99.5 F | DIASTOLIC BLOOD PRESSURE: 68 MMHG | HEART RATE: 90 BPM | SYSTOLIC BLOOD PRESSURE: 124 MMHG | HEIGHT: 69 IN | RESPIRATION RATE: 18 BRPM | WEIGHT: 185 LBS | OXYGEN SATURATION: 94 %

## 2018-07-30 DIAGNOSIS — J45.902 ASTHMA WITH STATUS ASTHMATICUS, UNSPECIFIED ASTHMA SEVERITY, UNSPECIFIED WHETHER PERSISTENT: ICD-10-CM

## 2018-07-30 DIAGNOSIS — R05.9 COUGH: ICD-10-CM

## 2018-07-30 DIAGNOSIS — M19.90 ARTHRITIS: ICD-10-CM

## 2018-07-30 DIAGNOSIS — J40 BRONCHITIS: ICD-10-CM

## 2018-07-30 PROCEDURE — 99214 OFFICE O/P EST MOD 30 MIN: CPT | Performed by: FAMILY MEDICINE

## 2018-07-30 RX ORDER — DOXYCYCLINE HYCLATE 100 MG/1
100 CAPSULE ORAL 2 TIMES DAILY
Qty: 28 CAPSULE | Refills: 0 | Status: SHIPPED | OUTPATIENT
Start: 2018-07-30 | End: 2018-08-13

## 2018-07-30 RX ORDER — METHYLPREDNISOLONE 4 MG/1
TABLET ORAL
Qty: 1 EACH | Refills: 0 | Status: SHIPPED | OUTPATIENT
Start: 2018-07-30 | End: 2018-09-19

## 2018-09-17 NOTE — PROGRESS NOTES
Oscar Urrutia MD     Chief Complaint   Patient presents with   • Follow-up       HPI   Selvin Garcia is a  52 y.o. male who is here for follow up. He has had no current complaints. The patient has not had complaints of: recent flair ups of symptoms.    Review of Systems:  Reviewed per patient intake note    Past History:  Past medical and surgical history, family history and social history reviewed and updated when appropriate.  Current medications and allergies reviewed and updated when appropriate.  Allergies:  Lexapro [escitalopram oxalate] and Ultram [tramadol hcl]    Vital Signs:   Temp:  [97.6 °F (36.4 °C)] 97.6 °F (36.4 °C)  BP: (120)/(88) 120/88    Physical Exam   CONSTITUTIONAL: well nourished, well-developed, alert, oriented, in no acute distress   COMMUNICATION AND VOICE: able to communicate normally, normal voice quality  HEAD: normocephalic, no lesions, atraumatic, no tenderness, no masses   FACE: appearance normal, no lesions, no tenderness, no deformities, facial motion symmetric  EYES: ocular motility normal, eyelids normal, orbits normal, no proptosis, conjunctiva normal , pupils equal, round  HEARING: response to conversational voice normal bilaterally   EXTERNAL EARS: auricles without lesions  EXTERNAL NOSE: structure normal, no tenderness on palpation, no nasal discharge, no lesions, no evidence of trauma, nostrils patent  INTRANASAL EXAM: nasal mucosa with mucosal congestion and erythema, nasal septum without overt anterior deviation  LIPS: structure normal, no tenderness on palpation, no lesions, no evidence of trauma  NECK: neck appearance normal  LYMPH NODES: no lymphadenopathy  CHEST/RESPIRATORY: respiratory effort normal  CARDIOVASCULAR: extremities without cyanosis or edema, no overt jugulovenous distension present  NEUROLOGIC/PSYCHIATRIC: oriented appropriately for age, mood normal, affect appropriate, cranial nerves intact grossly unless specifically mentioned above  "    {RESULTS REVIEW (Optional):93985::\" \":1}    Assessment   1. Chronic sinusitis, unspecified location    2. Chronic rhinitis, unspecified type        Plan    Continue current management plan.    Return in about 6 months (around 3/19/2019).    Oscar Urrutia MD  09/19/18  4:58 PM    "

## 2018-09-19 ENCOUNTER — OFFICE VISIT (OUTPATIENT)
Dept: OTOLARYNGOLOGY | Facility: CLINIC | Age: 53
End: 2018-09-19

## 2018-09-19 VITALS
WEIGHT: 186.4 LBS | BODY MASS INDEX: 27.61 KG/M2 | HEIGHT: 69 IN | TEMPERATURE: 97.6 F | DIASTOLIC BLOOD PRESSURE: 88 MMHG | SYSTOLIC BLOOD PRESSURE: 120 MMHG

## 2018-09-19 DIAGNOSIS — J31.0 CHRONIC RHINITIS, UNSPECIFIED TYPE: ICD-10-CM

## 2018-09-19 DIAGNOSIS — J32.9 CHRONIC SINUSITIS, UNSPECIFIED LOCATION: Primary | ICD-10-CM

## 2018-09-19 PROCEDURE — 99213 OFFICE O/P EST LOW 20 MIN: CPT | Performed by: OTOLARYNGOLOGY

## 2018-09-19 NOTE — PROGRESS NOTES
Candida Ramírez MA   Patient Intake Note    Review of Systems  Review of Systems   Constitutional: Negative for chills and fever.   HENT:        See hpi   Respiratory: Negative for cough, choking, shortness of breath and wheezing.    Gastrointestinal: Negative for nausea and vomiting.   Allergic/Immunologic: Positive for environmental allergies. Negative for food allergies.   Neurological: Negative for dizziness, light-headedness and headaches.   Hematological: Does not bruise/bleed easily.   Psychiatric/Behavioral: Positive for sleep disturbance.   All other systems reviewed and are negative.      QUALITY MEASURES    Body Mass Index Screening and Follow-Up Plan  Body mass index is 27.53 kg/m².  Patient's Body mass index is 27.53 kg/m². BMI is above normal parameters. Recommendations include: referral to primary care.    Tobacco Use: Screening and Cessation Intervention  Smoking status: Never Smoker                                                              Smokeless tobacco: Never Used                            Candida Ramírez MA  9/19/2018  4:08 PM

## 2018-09-19 NOTE — PATIENT INSTRUCTIONS
MyPlate from [employer]  The general, healthful diet is based on the 2010 Dietary Guidelines for Americans. The amount of food you need to eat from each food group depends on your age, sex, and level of physical activity and can be individualized by a dietitian. Go to ChooseMyPlate.gov for more information.  What do I need to know about the MyPlate plan?  · Enjoy your food, but eat less.  · Avoid oversized portions.  ? ½ of your plate should include fruits and vegetables.  ? ¼ of your plate should be grains.  ? ¼ of your plate should be protein.  Grains  · Make at least half of your grains whole grains.  · For a 2,000 calorie daily food plan, eat 6 oz every day.  · 1 oz is about 1 slice bread, 1 cup cereal, or ½ cup cooked rice, cereal, or pasta.  Vegetables  · Make half your plate fruits and vegetables.  · For a 2,000 calorie daily food plan, eat 2½ cups every day.  · 1 cup is about 1 cup raw or cooked vegetables or vegetable juice or 2 cups raw leafy greens.  Fruits  · Make half your plate fruits and vegetables.  · For a 2,000 calorie daily food plan, eat 2 cups every day.  · 1 cup is about 1 cup fruit or 100% fruit juice or ½ cup dried fruit.  Protein  · For a 2,000 calorie daily food plan, eat 5½ oz every day.  · 1 oz is about 1 oz meat, poultry, or fish, ¼ cup cooked beans, 1 egg, 1 Tbsp peanut butter, or ½ oz nuts or seeds.  Dairy  · Switch to fat-free or low-fat (1%) milk.  · For a 2,000 calorie daily food plan, eat 3 cups every day.  · 1 cup is about 1 cup milk or yogurt or soy milk (soy beverage), 1½ oz natural cheese, or 2 oz processed cheese.  Fats, Oils, and Empty Calories  · Only small amounts of oils are recommended.  · Empty calories are calories from solid fats or added sugars.  · Compare sodium in foods like soup, bread, and frozen meals. Choose the foods with lower numbers.  · Drink water instead of sugary drinks.  What foods can I eat?  Grains  Whole grains such as whole wheat, quinoa, millet, and  bulgur. Bread, rolls, and pasta made from whole grains. Brown or wild rice. Hot or cold cereals made from whole grains and without added sugar.  Vegetables  All fresh vegetables, especially fresh red, dark green, or orange vegetables. Peas and beans. Low-sodium frozen or canned vegetables prepared without added salt. Low-sodium vegetable juices.  Fruits  All fresh, frozen, and dried fruits. Canned fruit packed in water or fruit juice without added sugar. Fruit juices without added sugar.  Meats and Other Protein Sources  Boiled, baked, or grilled lean meat trimmed of fat. Skinless poultry. Fresh seafood and shellfish. Canned seafood packed in water. Unsalted nuts and unsalted nut butters. Tofu. Dried beans and pea. Eggs.  Dairy  Low-fat or fat-free milk, yogurt, and cheeses.  Sweets and Desserts  Frozen desserts made from low-fat milk.  Fats and Oils  Olive, peanut, and canola oils and margarine. Salad dressing and mayonnaise made from these oils.  Other  Soups and casseroles made from allowed ingredients and without added fat or salt.  The items listed above may not be a complete list of recommended foods or beverages. Contact your dietitian for more options.  What foods are not recommended?  Grains  Sweetened, low-fiber cereals. Packaged baked goods. Snack crackers and chips. Cheese crackers, butter crackers, and biscuits. Frozen waffles, sweet breads, doughnuts, pastries, packaged baking mixes, pancakes, cakes, and cookies.  Vegetables  Regular canned or frozen vegetables or vegetables prepared with salt. Canned tomatoes. Canned tomato sauce. Fried vegetables. Vegetables in cream sauce or cheese sauce.  Fruits  Fruits packed in syrup or made with added sugar.  Meats and Other Protein Sources  Marbled or fatty meats such as ribs. Poultry with skin. Fried meats, poultry, eggs, or fish. Sausages, hot dogs, and deli meats such as pastrami, bologna, or salami.  Dairy  Whole milk, cream, cheeses made from whole milk,  sour cream. Ice cream or yogurt made from whole milk or with added sugar.  Beverages  For adults, no more than one alcoholic drink per day. Regular soft drinks or other sugary beverages. Juice drinks.  Sweets and Desserts  Sugary or fatty desserts, candy, and other sweets.  Fats and Oils  Solid shortening or partially hydrogenated oils. Solid margarine. Margarine that contains trans fats. Butter.  The items listed above may not be a complete list of foods and beverages to avoid. Contact your dietitian for more information.  This information is not intended to replace advice given to you by your health care provider. Make sure you discuss any questions you have with your health care provider.  Document Released: 01/06/2009 Document Revised: 05/25/2017 Document Reviewed: 11/26/2014  [employer] Interactive Patient Education © 2018 [employer]

## 2018-11-19 RX ORDER — CETIRIZINE HYDROCHLORIDE 10 MG/1
TABLET ORAL
Qty: 90 TABLET | Refills: 2 | Status: SHIPPED | OUTPATIENT
Start: 2018-11-19 | End: 2019-08-18 | Stop reason: SDUPTHER

## 2018-11-19 RX ORDER — FLUTICASONE PROPIONATE 50 MCG
SPRAY, SUSPENSION (ML) NASAL
Qty: 48 G | Refills: 2 | Status: SHIPPED | OUTPATIENT
Start: 2018-11-19 | End: 2019-08-18 | Stop reason: SDUPTHER

## 2018-12-17 RX ORDER — MONTELUKAST SODIUM 10 MG/1
TABLET ORAL
Qty: 90 TABLET | Refills: 2 | Status: SHIPPED | OUTPATIENT
Start: 2018-12-17 | End: 2019-09-17 | Stop reason: SDUPTHER

## 2019-01-05 DIAGNOSIS — J40 BRONCHITIS: ICD-10-CM

## 2019-01-05 DIAGNOSIS — R05.9 COUGH: ICD-10-CM

## 2019-03-14 ENCOUNTER — OFFICE VISIT (OUTPATIENT)
Dept: FAMILY MEDICINE CLINIC | Facility: CLINIC | Age: 54
End: 2019-03-14

## 2019-03-14 VITALS
DIASTOLIC BLOOD PRESSURE: 70 MMHG | SYSTOLIC BLOOD PRESSURE: 110 MMHG | HEART RATE: 66 BPM | BODY MASS INDEX: 27.85 KG/M2 | HEIGHT: 69 IN | TEMPERATURE: 98.7 F | WEIGHT: 188 LBS | RESPIRATION RATE: 18 BRPM | OXYGEN SATURATION: 98 %

## 2019-03-14 DIAGNOSIS — S86.811A STRAIN OF CALF MUSCLE, RIGHT, INITIAL ENCOUNTER: ICD-10-CM

## 2019-03-14 PROCEDURE — 99213 OFFICE O/P EST LOW 20 MIN: CPT | Performed by: FAMILY MEDICINE

## 2019-03-14 RX ORDER — CELECOXIB 200 MG/1
200 CAPSULE ORAL DAILY
Qty: 15 CAPSULE | Refills: 0 | Status: SHIPPED | OUTPATIENT
Start: 2019-03-14 | End: 2020-02-24

## 2019-03-14 RX ORDER — IBUPROFEN 800 MG/1
800 TABLET ORAL EVERY 6 HOURS PRN
COMMUNITY
End: 2020-02-24

## 2019-03-14 NOTE — PROGRESS NOTES
Subjective   Selvin Garcia is a 53 y.o. male presenting with chief complaint of:   Chief Complaint   Patient presents with   • Leg Pain     X3 days with severe calf pain.       History of Present Illness :  Alone.  Here for primarily an acute issue today; pain R calf.  He has started a activity in the gym using a  and has been rather aggressive; recent 3.11.19.  It did not hurt while he was doing it; that evening he started having increasing and rather dramatic pain in his posterior upper right calf.  It is sore enough now that putting weight on his foot makes it hurt; he is having a limp.   Has multiple chronic problems to consider that might have a bearing on today's issues; not an interval appointment.       Chronic/acute problems reviewed today:   1. Strain of calf muscle, right, initial encounter      Has an/another acute issue today: none.    The following portions of the patient's history were reviewed and updated as appropriate: allergies, current medications, past family history, past medical history, past social history, past surgical history and problem list.      Current Outpatient Medications:   •  cetirizine (zyrTEC) 10 MG tablet, TAKE 1 TABLET DAILY, Disp: 90 tablet, Rfl: 2  •  fluticasone (FLONASE) 50 MCG/ACT nasal spray, USE 2 SPRAYS IN EACH NOSTRIL DAILY, Disp: 48 g, Rfl: 2  •  ibuprofen (ADVIL,MOTRIN) 800 MG tablet, Take 800 mg by mouth Every 6 (Six) Hours As Needed for Mild Pain ., Disp: , Rfl:   •  montelukast (SINGULAIR) 10 MG tablet, TAKE 1 TABLET EVERY NIGHT, Disp: 90 tablet, Rfl: 2  •  PROAIR  (90 Base) MCG/ACT inhaler, USE 2 INHALATIONS EVERY 4 HOURS AS NEEDED FOR WHEEZING, Disp: 25.5 g, Rfl: 1    No problems with medications.  Refills if needed done    Allergies   Allergen Reactions   • Lexapro [Escitalopram Oxalate] Itching   • Ultram [Tramadol Hcl] Itching       Review of Systems  Review of Systems  GENERAL:  Less active/slower with limits, speed, stamina for age and  fatigue with this. Sleep is ok. No fever now.  SKIN: No rash/skin lesion of concern:   ENDO:  No syncope, near or diaphoretic sweaty spells.  HEENT: No recent head injury; diffuse headache.   No vision change.  No hearing loss.  Ears without pain/drainage.  No sore throat.  Some increase nasal/sinus congestion/drainage. No epistaxis.  CHEST: No chest wall tenderness or mass.  Acute dry cough, with wheeze.  No SOB; no hemoptysis.  CV: No chest pain, palpitations, ankle edema.  GI: No heartburn, dysphagia.  No abdominal pain, diarrhea, constipation.  No rectal bleeding, or melena.    :  Voids without dysuria, or  incontinence to completion.  ORTHO: No painful/swollen joints but various on /off sore.  No sore neck or back.  No acute neck or back pain without recent injury.  NEURO: No dizziness, weakness of extremities.  No numbness/paresthesias.   PSYCH: No memory loss.  Mood good; not that anxious, depressed but/and not suicidal.  Tries to tolerate stress.   Screening:  Mammogram: NA  Bone density: NA  Low dose CT chest: NA  GI:   Colonoscopy+polyp/LH/Bod/12.9.13/5y-reminded  EGD+biop-gastric ulcer/[facility]/LH/8.14.15/8w  EGD+biop/LH/[facility]/10.16.15  Prostate: none  Usual lab order  12m CBC, CMP, LIPID, TSH, PSAs, Vit D    Lab Results:  Results for orders placed or performed during the hospital encounter of 07/26/16   CBC (No diff)   Result Value Ref Range    WBC 5.15 4.80 - 10.80 K/mcL    RBC 5.29 4.80 - 5.90 M/mcL    Hemoglobin 16.1 14.0 - 18.0 g/dL    Hematocrit 44.7 40.0 - 52.0 %    MCV 84.5 82.0 - 95.0 fL    MCH 30.4 28.0 - 32.0 pg    MCHC 36.0 33.0 - 36.0 gm/dL    RDW-SD 38.6 (L) 40.0 - 54.0 fL    RDW-CV 12.7 12.0 - 15.0 %    RDW 12.7 12 - 15 %    Platelets 264 130 - 400 K/mcL   Comprehensive metabolic panel   Result Value Ref Range    Sodium 143 135 - 145 mmol/L    Potassium 4.9 3.5 - 5.3 mmol/L    Chloride 104 98 - 110 mmol/L    CO2 27 24 - 31 mmol/L    Glucose 85 70 - 100 mg/dL    BUN 19 5 - 21 mg/dL     "Creatinine 0.97 0.5 - 1.4 mg/dL    Calcium 9.0 8.4 - 10.4 mg/dL    Total Protein 7.2 6.3 - 8.7 g/dL    Albumin 4.1 3.5 - 5.0 g/dL    Total Bilirubin 0.9 0.1 - 1.0 mg/dL    Alkaline Phosphatase 64 24 - 120 Units/L    AST (SGOT) 31 7 - 45 Units/L    ALT (SGPT) 20 0 - 54 Units/L    Anion Gap 12 4 - 13 mmol/L    Est GFR by Clearance 82 ml/min/1.732   Converted Surgical Pathology   Result Value Ref Range    CONVERTED (HISTORICAL) CASE TYPE Surgical Pathology     CONVERTED (HISTORICAL) ACCESSION NUMBER [id number]     CONVERTED (HISTORICAL) RESULT STATUS FINAL     CONVERTED (HISTORICAL) SPECIMEN DESCRIPTION Right and Left Sinus contents        A1C:No results for input(s): HGBA1C in the last 29102 hours.  PSA:No results for input(s): PSA in the last 33183 hours.  CBC:No results for input(s): WBC, HGB, HCT, PLT, IRONSERUM, IRON in the last 99597 hours.   BMP/CMP:No results for input(s): NA, K, CL, CO2, GLU, BUN, CREATININE, EGFRIFNONA, EGFRIFAFRI, CALCIUM in the last 13550 hours.  HEPATIC:No results for input(s): ALT, AST, ALKPHOS, TOTAL in the last 83542 hours.  THYROID:No results for input(s): TSH, T3FREE, FTI in the last 59805 hours.    Invalid input(s): T4FREE, T3, T4, TEUP,  TOTALT4    Objective   /70   Pulse 66   Temp 98.7 °F (37.1 °C) (Oral)   Resp 18   Ht 175.3 cm (69\")   Wt 85.3 kg (188 lb)   SpO2 98%   BMI 27.76 kg/m²   Body mass index is 27.76 kg/m².    Physical Exam  GENERAL:  Well nourished/developed in no acute distress.   SKIN: Turgor excellent, without wound, rash, lesion.  HEENT: Normal cephalic without trauma.  Pupils equal round reactive to light. Extraocular motions full without nystagmus.  No thyromegaly, mass, tenderness, lymphadenopathy and supple.  CV: Regular rhythm.  No murmur, gallop,  edema. Posterior pulses intact.  No carotid bruits.  CHEST: No chest wall tenderness or mass.   LUNGS: Symmetric motion with clear to auscultation.   ABD: Soft, nontender without mass.   ORTHO: Symmetric " extremities without swelling/point tenderness except R upper posterior calf.  No mass felt.  Tender to try to toe walk.   Full gross range of motion.  NEURO: CN 2-12 grossly intact.  Symmetric facies and UE/LE. 3-4/5 strength throughout.  Nonfocal use extremities. Speech clear.    PSYCH: Oriented x 3.  Pleasant calm, well kept.  Purposeful/directed conservation with intact short/long gross memory.     Assessment/Plan     1. Strain of calf muscle, right, initial encounter      Rx: reviewed/changes:  New Medications Ordered This Visit   Medications   • celecoxib (CELEBREX) 200 MG capsule     Sig: Take 1 capsule by mouth Daily.     Dispense:  15 capsule     Refill:  0       LAB/Testing/Referrals: reviewed/orders:   Today:   No orders of the defined types were placed in this encounter.    Chronic/recurrent labs above or change to:   Extra early morning testosterone with next lab  12m CBC, CMP, LIPID, TSH, PSAs, Vit D    Discussions:   MRI if keeps hurting  Rest, ice/heat alternate  Body mass index is 27.76 kg/m².  Patient's Body mass index is 27.76 kg/m². BMI is within normal parameters. No follow-up required..    Tobacco use reviewed:  Non-smoker    Patient Instructions   Return to activity with  supervision  Review with  what you were doing to PREVENT re-injury.       Follow up: Return for fasting lab when able, Dr Pedersen-, as needed;.  Future Appointments   Date Time Provider Department Center   3/20/2019  3:45 PM Oscar Urrutia MD [facility] None   3/21/2019  8:40 AM [facility] None

## 2019-03-15 ENCOUNTER — PRIOR AUTHORIZATION (OUTPATIENT)
Dept: FAMILY MEDICINE CLINIC | Facility: CLINIC | Age: 54
End: 2019-03-15

## 2019-03-20 ENCOUNTER — OFFICE VISIT (OUTPATIENT)
Dept: OTOLARYNGOLOGY | Facility: CLINIC | Age: 54
End: 2019-03-20

## 2019-03-20 VITALS
HEART RATE: 61 BPM | SYSTOLIC BLOOD PRESSURE: 128 MMHG | HEIGHT: 69 IN | TEMPERATURE: 97.8 F | DIASTOLIC BLOOD PRESSURE: 68 MMHG | WEIGHT: 187 LBS | BODY MASS INDEX: 27.7 KG/M2

## 2019-03-20 DIAGNOSIS — J32.9 CHRONIC SINUSITIS, UNSPECIFIED LOCATION: ICD-10-CM

## 2019-03-20 DIAGNOSIS — J31.0 CHRONIC RHINITIS: Primary | ICD-10-CM

## 2019-03-20 PROCEDURE — 99213 OFFICE O/P EST LOW 20 MIN: CPT | Performed by: OTOLARYNGOLOGY

## 2019-03-20 NOTE — PROGRESS NOTES
Oscar Urrutia MD     Chief Complaint   Patient presents with   • Follow-up        History of Present Illness  Selvin Garcia is a  53 y.o. male who is here for follow up. He has had no current complaints. The patient has not had complaints of: recent flair ups of symptoms.    Review of Systems  Reviewed per patient intake note    Past History:  Past medical and surgical history, family history and social history reviewed and updated when appropriate.  Current medications and allergies reviewed and updated when appropriate.  Allergies:  Lexapro [escitalopram oxalate] and Ultram [tramadol hcl]        Vital Signs:   Temp:  [97.8 °F (36.6 °C)] 97.8 °F (36.6 °C)  Heart Rate:  [61] 61  BP: (128)/(68) 128/68    Physical Exam:  CONSTITUTIONAL: well nourished, well-developed, alert, oriented, in no acute distress   COMMUNICATION AND VOICE: able to communicate normally, normal voice quality  HEAD: normocephalic, no lesions, atraumatic, no tenderness, no masses   FACE: appearance normal, no lesions, no tenderness, no deformities, facial motion symmetric  EYES: ocular motility normal, eyelids normal, orbits normal, no proptosis, conjunctiva normal , pupils equal, round  HEARING: response to conversational voice normal bilaterally   EXTERNAL EARS: auricles without lesions  EXTERNAL NOSE: structure normal, no tenderness on palpation, no nasal discharge, no lesions, no evidence of trauma, nostrils patent  INTRANASAL EXAM: nasal mucosa with mucosal congestion and erythema, nasal septum without overt anterior deviation  LIPS: structure normal, no tenderness on palpation, no lesions, no evidence of trauma  NECK: neck appearance normal  LYMPH NODES: no lymphadenopathy  CHEST/RESPIRATORY: respiratory effort normal  CARDIOVASCULAR: extremities without cyanosis or edema, no overt jugulovenous distension present  NEUROLOGIC/PSYCHIATRIC: oriented appropriately for age, mood normal, affect appropriate, cranial nerves intact  grossly unless specifically mentioned above          Assessment   1. Chronic rhinitis    2. Chronic sinusitis, unspecified location        Plan    Continue current management plan.    Return in about 6 months (around 9/20/2019).    Oscar Urrutia MD  03/20/19  5:07 PM

## 2019-03-20 NOTE — PROGRESS NOTES
Briseida Newell   Patient Intake Note    Review of Systems  Review of Systems   Constitutional: Negative for chills, fatigue and fever.   HENT:        See HPI   Eyes: Positive for pain and itching. Negative for discharge and redness.   Respiratory: Positive for cough. Negative for choking and shortness of breath.    Gastrointestinal: Negative for diarrhea, nausea and vomiting.   Musculoskeletal: Negative for neck pain and neck stiffness.   Neurological: Positive for light-headedness and headaches. Negative for dizziness.   Psychiatric/Behavioral: Positive for sleep disturbance.   All other systems reviewed and are negative.      QUALITY MEASURES    Body Mass Index Screening and Follow-Up Plan  Body mass index is 27.6 kg/m².  Patient's Body mass index is 27.6 kg/m². BMI is above normal parameters. Recommendations include: referral to primary care.    Tobacco Use: Screening and Cessation Intervention  Social History    Tobacco Use      Smoking status: Never Smoker      Smokeless tobacco: Never Used        Briseida Newell  3/20/2019  4:22 PM

## 2019-03-21 DIAGNOSIS — F32.A DEPRESSION, UNSPECIFIED DEPRESSION TYPE: ICD-10-CM

## 2019-03-21 DIAGNOSIS — Z13.6 SCREENING, ISCHEMIC HEART DISEASE: ICD-10-CM

## 2019-03-21 DIAGNOSIS — Z12.5 SCREENING PSA (PROSTATE SPECIFIC ANTIGEN): Primary | ICD-10-CM

## 2019-03-22 DIAGNOSIS — R74.8 ELEVATED LIVER ENZYMES: Primary | ICD-10-CM

## 2019-03-22 LAB
25(OH)D3+25(OH)D2 SERPL-MCNC: 31.9 NG/ML (ref 30–100)
ALBUMIN SERPL-MCNC: 3.7 G/DL (ref 3.5–5)
ALBUMIN/GLOB SERPL: 1.4 G/DL (ref 1.1–2.5)
ALP SERPL-CCNC: 68 U/L (ref 24–120)
ALT SERPL-CCNC: 78 U/L (ref 0–54)
AST SERPL-CCNC: 93 U/L (ref 7–45)
BASOPHILS # BLD AUTO: 0.06 10*3/MM3 (ref 0–0.2)
BASOPHILS NFR BLD AUTO: 1.2 % (ref 0–2)
BILIRUB SERPL-MCNC: 0.8 MG/DL (ref 0.1–1)
BUN SERPL-MCNC: 22 MG/DL (ref 5–21)
BUN/CREAT SERPL: 22 (ref 7–25)
CALCIUM SERPL-MCNC: 9 MG/DL (ref 8.4–10.4)
CHLORIDE SERPL-SCNC: 103 MMOL/L (ref 98–110)
CHOLEST SERPL-MCNC: 178 MG/DL (ref 130–200)
CHOLEST/HDLC SERPL: 4.24 {RATIO}
CO2 SERPL-SCNC: 27 MMOL/L (ref 24–31)
CREAT SERPL-MCNC: 1 MG/DL (ref 0.5–1.4)
EOSINOPHIL # BLD AUTO: 0.3 10*3/MM3 (ref 0–0.7)
EOSINOPHIL NFR BLD AUTO: 5.8 % (ref 0–4)
ERYTHROCYTE [DISTWIDTH] IN BLOOD BY AUTOMATED COUNT: 12.8 % (ref 12–15)
GLOBULIN SER CALC-MCNC: 2.7 GM/DL
GLUCOSE SERPL-MCNC: 80 MG/DL (ref 70–100)
HCT VFR BLD AUTO: 43.3 % (ref 40–52)
HDLC SERPL-MCNC: 42 MG/DL
HGB BLD-MCNC: 15.1 G/DL (ref 14–18)
IMM GRANULOCYTES # BLD AUTO: 0.02 10*3/MM3 (ref 0–0.05)
IMM GRANULOCYTES NFR BLD AUTO: 0.4 % (ref 0–5)
LDLC SERPL CALC-MCNC: 118 MG/DL (ref 0–99)
LYMPHOCYTES # BLD AUTO: 1.44 10*3/MM3 (ref 0.72–4.86)
LYMPHOCYTES NFR BLD AUTO: 27.8 % (ref 15–45)
MCH RBC QN AUTO: 29.8 PG (ref 28–32)
MCHC RBC AUTO-ENTMCNC: 34.9 G/DL (ref 33–36)
MCV RBC AUTO: 85.4 FL (ref 82–95)
MONOCYTES # BLD AUTO: 0.37 10*3/MM3 (ref 0.19–1.3)
MONOCYTES NFR BLD AUTO: 7.1 % (ref 4–12)
NEUTROPHILS # BLD AUTO: 2.99 10*3/MM3 (ref 1.87–8.4)
NEUTROPHILS NFR BLD AUTO: 57.7 % (ref 39–78)
NRBC BLD AUTO-RTO: 0 /100 WBC (ref 0–0)
PLATELET # BLD AUTO: 340 10*3/MM3 (ref 130–400)
POTASSIUM SERPL-SCNC: 4.8 MMOL/L (ref 3.5–5.3)
PROT SERPL-MCNC: 6.4 G/DL (ref 6.3–8.7)
PSA SERPL-MCNC: 0.49 NG/ML (ref 0–4)
RBC # BLD AUTO: 5.07 10*6/MM3 (ref 4.8–5.9)
SODIUM SERPL-SCNC: 139 MMOL/L (ref 135–145)
TESTOST SERPL-MCNC: 557 NG/DL (ref 264–916)
TRIGL SERPL-MCNC: 89 MG/DL (ref 0–149)
TSH SERPL DL<=0.005 MIU/L-ACNC: 1.09 MIU/ML (ref 0.47–4.68)
VLDLC SERPL CALC-MCNC: 17.8 MG/DL
WBC # BLD AUTO: 5.18 10*3/MM3 (ref 4.8–10.8)

## 2019-03-22 NOTE — PROGRESS NOTES
Pt informed testosterone is ok. Liver enzymes are elevated. Needs liver u/s and hepatitis labs. Appt made for the lab and referral in for the liver U/S

## 2019-03-27 DIAGNOSIS — R74.8 ELEVATED LIVER ENZYMES: ICD-10-CM

## 2019-04-09 DIAGNOSIS — R74.8 ELEVATED LIVER ENZYMES: Primary | ICD-10-CM

## 2019-04-10 ENCOUNTER — RESULTS ENCOUNTER (OUTPATIENT)
Dept: FAMILY MEDICINE CLINIC | Facility: CLINIC | Age: 54
End: 2019-04-10

## 2019-04-10 DIAGNOSIS — R74.8 ELEVATED LIVER ENZYMES: ICD-10-CM

## 2019-04-11 LAB
ALBUMIN SERPL-MCNC: 4.5 G/DL (ref 3.5–5.2)
ALBUMIN/GLOB SERPL: 2 G/DL
ALP SERPL-CCNC: 82 U/L (ref 39–117)
ALT SERPL-CCNC: 17 U/L (ref 1–41)
AST SERPL-CCNC: 21 U/L (ref 1–40)
BILIRUB SERPL-MCNC: 0.6 MG/DL (ref 0.2–1.2)
BUN SERPL-MCNC: 22 MG/DL (ref 6–20)
BUN/CREAT SERPL: 22.2 (ref 7–25)
CALCIUM SERPL-MCNC: 9.3 MG/DL (ref 8.6–10.5)
CHLORIDE SERPL-SCNC: 104 MMOL/L (ref 98–107)
CO2 SERPL-SCNC: 25.8 MMOL/L (ref 22–29)
CREAT SERPL-MCNC: 0.99 MG/DL (ref 0.76–1.27)
GLOBULIN SER CALC-MCNC: 2.3 GM/DL
GLUCOSE SERPL-MCNC: 103 MG/DL (ref 65–99)
HAV AB SER QL IA: NEGATIVE
HBV CORE AB SERPL QL IA: NEGATIVE
HBV SURFACE AB SER QL: NON REACTIVE
HBV SURFACE AG SERPL QL IA: NEGATIVE
HCV AB S/CO SERPL IA: <0.1 S/CO RATIO (ref 0–0.9)
POTASSIUM SERPL-SCNC: 4.3 MMOL/L (ref 3.5–5.2)
PROT SERPL-MCNC: 6.8 G/DL (ref 6–8.5)
SODIUM SERPL-SCNC: 143 MMOL/L (ref 136–145)

## 2019-04-11 NOTE — PROGRESS NOTES
Pt informed liver is back to normal( must be diet, or some rx he was using). Hepatitis labs are normal. Suggest 6/12 mos monitoring for awhile

## 2019-07-06 DIAGNOSIS — J40 BRONCHITIS: ICD-10-CM

## 2019-07-06 DIAGNOSIS — R05.9 COUGH: ICD-10-CM

## 2019-07-12 ENCOUNTER — CLINICAL SUPPORT (OUTPATIENT)
Dept: FAMILY MEDICINE CLINIC | Facility: CLINIC | Age: 54
End: 2019-07-12

## 2019-07-12 VITALS
HEART RATE: 76 BPM | OXYGEN SATURATION: 97 % | HEIGHT: 66 IN | RESPIRATION RATE: 18 BRPM | WEIGHT: 188 LBS | TEMPERATURE: 98.5 F | SYSTOLIC BLOOD PRESSURE: 112 MMHG | DIASTOLIC BLOOD PRESSURE: 72 MMHG | BODY MASS INDEX: 30.22 KG/M2

## 2019-07-12 DIAGNOSIS — Z02.89 ENCOUNTER FOR EXAMINATION REQUIRED BY DEPARTMENT OF TRANSPORTATION (DOT): Primary | ICD-10-CM

## 2019-07-12 DIAGNOSIS — J45.902 ASTHMA WITH STATUS ASTHMATICUS, UNSPECIFIED ASTHMA SEVERITY, UNSPECIFIED WHETHER PERSISTENT: ICD-10-CM

## 2019-07-12 DIAGNOSIS — M19.90 OSTEOARTHRITIS, UNSPECIFIED OSTEOARTHRITIS TYPE, UNSPECIFIED SITE: Chronic | ICD-10-CM

## 2019-07-12 DIAGNOSIS — N40.0 PROSTATISM: Chronic | ICD-10-CM

## 2019-07-12 DIAGNOSIS — J31.0 CHRONIC RHINITIS: ICD-10-CM

## 2019-07-12 DIAGNOSIS — R74.8 ABNORMAL LIVER ENZYMES: ICD-10-CM

## 2019-07-12 DIAGNOSIS — J32.9 CHRONIC SINUSITIS, UNSPECIFIED LOCATION: ICD-10-CM

## 2019-07-12 LAB
BILIRUB BLD-MCNC: NEGATIVE MG/DL
CLARITY, POC: CLEAR
COLOR UR: YELLOW
GLUCOSE UR STRIP-MCNC: NEGATIVE MG/DL
KETONES UR QL: NEGATIVE
LEUKOCYTE EST, POC: NEGATIVE
NITRITE UR-MCNC: NEGATIVE MG/ML
PH UR: 5 [PH] (ref 5–8)
PROT UR STRIP-MCNC: NEGATIVE MG/DL
RBC # UR STRIP: ABNORMAL /UL
SP GR UR: 1.01 (ref 1–1.03)
UROBILINOGEN UR QL: NORMAL

## 2019-07-12 PROCEDURE — DOTPHY: Performed by: FAMILY MEDICINE

## 2019-07-12 PROCEDURE — 81003 URINALYSIS AUTO W/O SCOPE: CPT | Performed by: FAMILY MEDICINE

## 2019-08-19 RX ORDER — FLUTICASONE PROPIONATE 50 MCG
SPRAY, SUSPENSION (ML) NASAL
Qty: 48 G | Refills: 4 | Status: SHIPPED | OUTPATIENT
Start: 2019-08-19 | End: 2020-09-04 | Stop reason: SDUPTHER

## 2019-08-19 RX ORDER — CETIRIZINE HYDROCHLORIDE 10 MG/1
TABLET ORAL
Qty: 90 TABLET | Refills: 4 | Status: SHIPPED | OUTPATIENT
Start: 2019-08-19 | End: 2020-11-11

## 2019-09-17 RX ORDER — MONTELUKAST SODIUM 10 MG/1
10 TABLET ORAL NIGHTLY
Qty: 90 TABLET | Refills: 3 | Status: SHIPPED | OUTPATIENT
Start: 2019-09-17 | End: 2019-10-02 | Stop reason: SDUPTHER

## 2019-10-02 RX ORDER — MONTELUKAST SODIUM 10 MG/1
10 TABLET ORAL NIGHTLY
Qty: 90 TABLET | Refills: 3 | Status: SHIPPED | OUTPATIENT
Start: 2019-10-02 | End: 2020-02-24 | Stop reason: SDUPTHER

## 2019-10-07 ENCOUNTER — TELEPHONE (OUTPATIENT)
Dept: OTOLARYNGOLOGY | Facility: CLINIC | Age: 54
End: 2019-10-07

## 2019-10-07 ENCOUNTER — TELEPHONE (OUTPATIENT)
Dept: FAMILY MEDICINE CLINIC | Facility: CLINIC | Age: 54
End: 2019-10-07

## 2019-10-07 RX ORDER — METHYLPREDNISOLONE 4 MG/1
TABLET ORAL
Qty: 1 EACH | Refills: 0 | Status: SHIPPED | OUTPATIENT
Start: 2019-10-07 | End: 2019-10-07 | Stop reason: SDUPTHER

## 2019-10-07 RX ORDER — AMOXICILLIN AND CLAVULANATE POTASSIUM 875; 125 MG/1; MG/1
1 TABLET, FILM COATED ORAL EVERY 12 HOURS
Qty: 20 TABLET | Refills: 0 | Status: SHIPPED | OUTPATIENT
Start: 2019-10-07 | End: 2019-10-07 | Stop reason: SDUPTHER

## 2019-10-07 RX ORDER — AMOXICILLIN AND CLAVULANATE POTASSIUM 875; 125 MG/1; MG/1
1 TABLET, FILM COATED ORAL EVERY 12 HOURS
Qty: 20 TABLET | Refills: 0 | Status: SHIPPED | OUTPATIENT
Start: 2019-10-07 | End: 2020-02-24

## 2019-10-07 RX ORDER — METHYLPREDNISOLONE 4 MG/1
TABLET ORAL
Qty: 1 EACH | Refills: 0 | Status: SHIPPED | OUTPATIENT
Start: 2019-10-07 | End: 2021-03-23 | Stop reason: SDUPTHER

## 2019-10-07 NOTE — TELEPHONE ENCOUNTER
Patient called with complaints of sinus infection.  Sinus congestion, pressure ,and drainage.  Requested antibiotics and steroids to be sent to pharmacy.

## 2019-10-07 NOTE — TELEPHONE ENCOUNTER
Pt called and has a sinus infection for about 2 weeks. Would like to know if he can get something for it.

## 2020-01-04 DIAGNOSIS — J40 BRONCHITIS: ICD-10-CM

## 2020-01-04 DIAGNOSIS — R05.9 COUGH: ICD-10-CM

## 2020-01-14 ENCOUNTER — FLU SHOT (OUTPATIENT)
Dept: FAMILY MEDICINE CLINIC | Facility: CLINIC | Age: 55
End: 2020-01-14

## 2020-01-14 DIAGNOSIS — Z23 FLU VACCINE NEED: ICD-10-CM

## 2020-01-14 PROCEDURE — 90471 IMMUNIZATION ADMIN: CPT | Performed by: FAMILY MEDICINE

## 2020-01-14 PROCEDURE — 90674 CCIIV4 VAC NO PRSV 0.5 ML IM: CPT | Performed by: FAMILY MEDICINE

## 2020-02-24 ENCOUNTER — OFFICE VISIT (OUTPATIENT)
Dept: FAMILY MEDICINE CLINIC | Facility: CLINIC | Age: 55
End: 2020-02-24

## 2020-02-24 VITALS
TEMPERATURE: 98.6 F | HEIGHT: 66 IN | RESPIRATION RATE: 16 BRPM | BODY MASS INDEX: 30.86 KG/M2 | DIASTOLIC BLOOD PRESSURE: 82 MMHG | SYSTOLIC BLOOD PRESSURE: 122 MMHG | WEIGHT: 192 LBS | OXYGEN SATURATION: 95 % | HEART RATE: 70 BPM

## 2020-02-24 DIAGNOSIS — F32.A DEPRESSION, UNSPECIFIED DEPRESSION TYPE: Chronic | ICD-10-CM

## 2020-02-24 DIAGNOSIS — F41.9 ANXIETY: Primary | Chronic | ICD-10-CM

## 2020-02-24 PROCEDURE — 99213 OFFICE O/P EST LOW 20 MIN: CPT | Performed by: FAMILY MEDICINE

## 2020-02-24 RX ORDER — CLONAZEPAM 0.5 MG/1
0.5 TABLET ORAL NIGHTLY PRN
Qty: 15 TABLET | Refills: 0 | Status: SHIPPED | OUTPATIENT
Start: 2020-02-24 | End: 2020-03-11 | Stop reason: SDUPTHER

## 2020-02-24 RX ORDER — MONTELUKAST SODIUM 10 MG/1
10 TABLET ORAL NIGHTLY
Qty: 90 TABLET | Refills: 3
Start: 2020-02-24 | End: 2022-04-14 | Stop reason: SDUPTHER

## 2020-02-24 RX ORDER — BUPROPION HYDROCHLORIDE 150 MG/1
150 TABLET ORAL DAILY
Qty: 30 TABLET | Refills: 1 | Status: SHIPPED | OUTPATIENT
Start: 2020-02-24 | End: 2020-08-10

## 2020-02-24 RX ORDER — ACETAMINOPHEN 500 MG
1000 TABLET ORAL EVERY 6 HOURS PRN
COMMUNITY
End: 2020-09-04

## 2020-02-24 RX ORDER — NAPROXEN SODIUM 220 MG
220 TABLET ORAL 2 TIMES DAILY PRN
COMMUNITY
End: 2020-10-19

## 2020-02-25 NOTE — PROGRESS NOTES
Subjective   Selvin Garcia is a 54 y.o. male presenting with chief complaint of:   Chief Complaint   Patient presents with   • Depression       History of Present Illness :  Alone.  Here for primarily an acute issue today; variable moods with tendencies towards anxiety and depression.  Resultant difficulties with concentration performance at work activities home and even as staying asleep.  He can fall asleep but he stays asleep very shortly.  His wife is left; is unlikely she will return.  She is moved in with her boyfriend in [facility].  Fortunately his son which was abusing prescription and narcotic drugs he is now on Suboxone  receiving acceptable treatment and is doing better.  He continues to have another son with special needs who is a senior and will be faced with some issues in the summer of how that young man is managed and cared for.  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  He is considering a divorce and get an .  Though he has had thoughts that his life was not worth while he could not allow himself to consider suicide..       Has multiple chronic problems to consider that might have a bearing on today's issues; not an interval appointment.       Chronic/acute problems reviewed today: above   1. Anxiety    2. Depression, unspecified depression type      Has an/another acute issue today: none.    The following portions of the patient's history were reviewed and updated as appropriate: allergies, current medications, past family history, past medical history, past social history, past surgical history and problem list.      Current Outpatient Medications:   •  cetirizine (zyrTEC) 10 MG tablet, TAKE 1 TABLET DAILY, Disp: 90 tablet, Rfl: 4  •  fluticasone (FLONASE) 50 MCG/ACT nasal spray, USE 2 SPRAYS IN EACH NOSTRIL DAILY, Disp: 48 g, Rfl: 4  •  montelukast (SINGULAIR) 10 MG tablet, Take 1 tablet by mouth Every  Night., Disp: 90 tablet, Rfl: 3  •  PROAIR  (90 Base) MCG/ACT inhaler, USE 2 INHALATIONS EVERY 4 HOURS AS NEEDED FOR WHEEZING, Disp: 25.5 g, Rfl: 0  •  acetaminophen (TYLENOL) 500 MG tablet, Take 1,000 mg by mouth Every 6 (Six) Hours As Needed for Headache., Disp: , Rfl:   •  beclomethasone (QVAR) 40 MCG/ACT inhaler, Inhale 2 puffs 2 (Two) Times a Day As Needed., Disp: , Rfl:   •  naproxen sodium (ALEVE) 220 MG tablet, Take 220 mg by mouth 2 (Two) Times a Day As Needed for Headache., Disp: , Rfl:     No problems with medications.  Refills if needed done    Allergies   Allergen Reactions   • Lexapro [Escitalopram Oxalate] Itching   • Ultram [Tramadol Hcl] Itching       Review of Systems  GENERAL:  Less active/slower with limits, speed, stamina for age and fatigue with above. Sleep is ok falling; very difficult staying asleep.  No fever now.  SKIN: No rash/skin lesion of concern:   ENDO:  No syncope, near or diaphoretic sweaty spells.  HEENT: No recent head injury; diffuse headache.   No vision change.  No hearing loss.  Ears without pain/drainage.  No sore throat.  No significant nasal/sinus congestion/drainage. No epistaxis.  CHEST: No chest wall tenderness or mass.  No cough, with wheeze.  No SOB; no hemoptysis.  CV: No chest pain, palpitations, ankle edema.  GI: No heartburn, dysphagia.  No abdominal pain, diarrhea, constipation.  No rectal bleeding, or melena.    :  Voids without dysuria, or  incontinence to completion.  ORTHO: No painful/swollen joints but various on /off sore.  No sore neck or back.  No acute neck or back pain without recent injury.  NEURO: No dizziness, weakness of extremities.  No numbness/paresthesias.   PSYCH: No memory loss.  Mood down; on/off anxious, depressed but/and not suicidal.  Tries to tolerate stress.   Screening:  Mammogram: NA  Bone density: NA  Low dose CT chest:Tobacco-smoker/never NA  GI:   Colonoscopy+polyp/LH/Bod/12.9.13/5y-reminded  EGD+biop-gastric  "ulcer/[facility]//8.14.15/8w  EGD+biop//[facility]/10.16.15  Prostate: none  Usual lab order  12m CBC, CMP, LIPID, TSH, PSAs, Vit D    Lab Results:  Results for orders placed or performed in visit on 07/12/19   POCT urinalysis dipstick, multipro   Result Value Ref Range    Color Yellow Yellow, Straw, Dark Yellow, Claribel    Clarity, UA Clear Clear    Glucose, UA Negative Negative, 1000 mg/dL (3+) mg/dL    Bilirubin Negative Negative    Ketones, UA Negative Negative    Specific Gravity  1.015 1.005 - 1.030    Blood, UA Trace (A) Negative    pH, Urine 5.0 5.0 - 8.0    Protein, POC Negative Negative mg/dL    Urobilinogen, UA Normal Normal    Nitrite, UA Negative Negative    Leukocytes Negative Negative       A1C:No results for input(s): HGBA1C in the last 32865 hours.  PSA:  Lab Results - Last 18 Months   Lab Units 03/21/19  0714   PSA ng/mL 0.490     CBC:  Lab Results - Last 18 Months   Lab Units 03/21/19  0714   WBC 10*3/mm3 5.18   HEMOGLOBIN g/dL 15.1   HEMATOCRIT % 43.3   PLATELETS 10*3/mm3 340      BMP/CMP:  Lab Results - Last 18 Months   Lab Units 04/10/19  1459 03/21/19  0714   SODIUM mmol/L 143 139   POTASSIUM mmol/L 4.3 4.8   CHLORIDE mmol/L 104 103   TOTAL CO2 mmol/L 25.8 27.0   GLUCOSE mg/dL 103* 80   BUN mg/dL 22* 22*   CREATININE mg/dL 0.99 1.00   EGFR IF NONAFRICN AM mL/min/1.73 79 78   EGFR IF AFRICN AM mL/min/1.73 95 94   CALCIUM mg/dL 9.3 9.0     HEPATIC:  Lab Results - Last 18 Months   Lab Units 04/10/19  1459 03/21/19  0714   ALT (SGPT) U/L 17 78*   AST (SGOT) U/L 21 93*   ALK PHOS U/L 82 68     THYROID:  Lab Results - Last 18 Months   Lab Units 03/21/19  0714   TSH mIU/mL 1.090       Objective   /82 (BP Location: Right arm, Patient Position: Sitting, Cuff Size: Large Adult)   Pulse 70   Temp 98.6 °F (37 °C) (Oral)   Resp 16   Ht 167.6 cm (66\")   Wt 87.1 kg (192 lb)   SpO2 95%   BMI 30.99 kg/m²   Body mass index is 30.99 kg/m².    Physical Exam  GENERAL:  Well nourished/developed in no acute " distress.   SKIN: Turgor excellent, without wound, rash, lesion.  HEENT: Normal cephalic without trauma.  Pupils equal round reactive to light. Extraocular motions full without nystagmus.  No thyromegaly, mass, tenderness, lymphadenopathy and supple.  CV: Regular rhythm.  No murmur, gallop,  edema. Posterior pulses intact.  No carotid bruits.  CHEST: No chest wall tenderness or mass.   LUNGS: Symmetric motion with clear to auscultation.   ABD: Soft, nontender without mass.   ORTHO: Symmetric extremities without swelling/point tenderness except R upper posterior calf.  No mass felt.  Tender to try to toe walk.   Full gross range of motion.  NEURO: CN 2-12 grossly intact.  Symmetric facies and UE/LE. 3-4/5 strength throughout.  Nonfocal use extremities. Speech clear.    PSYCH: Oriented x 3.  Pleasant calm, well kept.  Purposeful/directed conservation with intact short/long gross memory.      Assessment/Plan     1. Anxiety    2. Depression, unspecified depression type        Rx: reviewed/changes:  New Medications Ordered This Visit   Medications   • montelukast (SINGULAIR) 10 MG tablet     Sig: Take 1 tablet by mouth Every Night.     Dispense:  90 tablet     Refill:  3   • clonazePAM (KLONOPIN) 0.5 MG tablet     Sig: Take 1 tablet by mouth At Night As Needed for Anxiety.     Dispense:  15 tablet     Refill:  0   • buPROPion XL (WELLBUTRIN XL) 150 MG 24 hr tablet     Sig: Take 1 tablet by mouth Daily.     Dispense:  30 tablet     Refill:  1     LAB/Testing/Referrals: reviewed/orders:   Today:   No orders of the defined types were placed in this encounter.    Chronic/recurrent labs above or change to:   same     Discussions:   Suggested working with  or past .   Body mass index is 30.99 kg/m².  Patient's Body mass index is 30.99 kg/m². BMI is within normal parameters. No follow-up required..    Tobacco use reviewed:    Non-smoker  Selvin LAGUNAS Primitivodeneensandipkylee  reports that he has never smoked. He has never used  smokeless tobacco..   There are no Patient Instructions on file for this visit.    Follow up: Return for will see how testing/or treatment goes and decide;.  No future appointments.

## 2020-03-11 DIAGNOSIS — F32.A DEPRESSION, UNSPECIFIED DEPRESSION TYPE: Chronic | ICD-10-CM

## 2020-03-11 DIAGNOSIS — F41.9 ANXIETY: Chronic | ICD-10-CM

## 2020-03-11 RX ORDER — CLONAZEPAM 0.5 MG/1
0.5 TABLET ORAL NIGHTLY PRN
Qty: 15 TABLET | Refills: 0 | Status: SHIPPED | OUTPATIENT
Start: 2020-03-11 | End: 2020-05-15 | Stop reason: SDUPTHER

## 2020-03-11 NOTE — TELEPHONE ENCOUNTER
Patient called in to request RX refill of clonazePAM (KLONOPIN) 0.5 MG tablet    Patient advised that while on this medication he has seen no changes and is wondering if he needs to increase dosage.    Pharmacy confirmed      Please Advise

## 2020-03-17 ENCOUNTER — TELEPHONE (OUTPATIENT)
Dept: FAMILY MEDICINE CLINIC | Facility: CLINIC | Age: 55
End: 2020-03-17

## 2020-03-17 DIAGNOSIS — R07.81 RIB PAIN ON LEFT SIDE: Primary | ICD-10-CM

## 2020-03-17 NOTE — TELEPHONE ENCOUNTER
Patient is requesting to have a xray ordered, states that he was running full force and tripped over an extension cord. Has a lot of pain in rib area and is continuously getting worse.

## 2020-05-15 DIAGNOSIS — F32.A DEPRESSION, UNSPECIFIED DEPRESSION TYPE: Chronic | ICD-10-CM

## 2020-05-15 DIAGNOSIS — F41.9 ANXIETY: Chronic | ICD-10-CM

## 2020-05-15 RX ORDER — CLONAZEPAM 0.5 MG/1
0.5 TABLET ORAL NIGHTLY PRN
Qty: 15 TABLET | Refills: 0 | Status: SHIPPED | OUTPATIENT
Start: 2020-05-15 | End: 2020-08-10

## 2020-05-15 NOTE — TELEPHONE ENCOUNTER
"Son called for his Dad \"he is so upset with the things going on with mom and he is shaking and he needs something, I never seen him like this before.\" advised son to put the patient on the phone, spoke to pt and asked if he is needing refill of the klonopin so he can calm down? Pt advised \"yes, for me\"    Protocol last refill 3-11-20    Il  wnl  "

## 2020-07-14 ENCOUNTER — TRANSCRIBE ORDERS (OUTPATIENT)
Dept: ADMINISTRATIVE | Facility: HOSPITAL | Age: 55
End: 2020-07-14

## 2020-07-14 DIAGNOSIS — S83.242D ACUTE MEDIAL MENISCUS TEAR OF LEFT KNEE, SUBSEQUENT ENCOUNTER: Primary | ICD-10-CM

## 2020-07-15 ENCOUNTER — HOSPITAL ENCOUNTER (OUTPATIENT)
Dept: MRI IMAGING | Facility: HOSPITAL | Age: 55
Discharge: HOME | End: 2020-07-15
Admitting: ORTHOPAEDIC SURGERY

## 2020-07-15 DIAGNOSIS — S83.242D ACUTE MEDIAL MENISCUS TEAR OF LEFT KNEE, SUBSEQUENT ENCOUNTER: ICD-10-CM

## 2020-07-15 PROCEDURE — 73721 MRI JNT OF LWR EXTRE W/O DYE: CPT

## 2020-07-17 ENCOUNTER — APPOINTMENT (OUTPATIENT)
Dept: MRI IMAGING | Facility: HOSPITAL | Age: 55
End: 2020-07-17

## 2020-08-07 ENCOUNTER — TELEPHONE (OUTPATIENT)
Dept: FAMILY MEDICINE CLINIC | Facility: CLINIC | Age: 55
End: 2020-08-07

## 2020-08-07 NOTE — TELEPHONE ENCOUNTER
Patient spouse Kelsy called and said that patient is a  and is unable to be prescribed sleeping medication. Patient spouse advised that he is having trouble staying awake.     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 Patient spouse also advised that his concentration is being affected.     Please contact patient and/or spouse @ [phone].    [facility]

## 2020-08-10 ENCOUNTER — OFFICE VISIT (OUTPATIENT)
Dept: FAMILY MEDICINE CLINIC | Facility: CLINIC | Age: 55
End: 2020-08-10

## 2020-08-10 VITALS
OXYGEN SATURATION: 98 % | HEIGHT: 66 IN | HEART RATE: 71 BPM | DIASTOLIC BLOOD PRESSURE: 80 MMHG | BODY MASS INDEX: 29.57 KG/M2 | RESPIRATION RATE: 16 BRPM | WEIGHT: 184 LBS | TEMPERATURE: 98.4 F | SYSTOLIC BLOOD PRESSURE: 124 MMHG

## 2020-08-10 DIAGNOSIS — F41.9 ANXIETY: Primary | Chronic | ICD-10-CM

## 2020-08-10 DIAGNOSIS — M19.90 OSTEOARTHRITIS, UNSPECIFIED OSTEOARTHRITIS TYPE, UNSPECIFIED SITE: Chronic | ICD-10-CM

## 2020-08-10 DIAGNOSIS — G47.00 INSOMNIA, UNSPECIFIED TYPE: ICD-10-CM

## 2020-08-10 DIAGNOSIS — F32.A DEPRESSION, UNSPECIFIED DEPRESSION TYPE: Chronic | ICD-10-CM

## 2020-08-10 PROCEDURE — 99213 OFFICE O/P EST LOW 20 MIN: CPT | Performed by: FAMILY MEDICINE

## 2020-08-10 RX ORDER — BUSPIRONE HYDROCHLORIDE 10 MG/1
10 TABLET ORAL 3 TIMES DAILY
Qty: 30 TABLET | Refills: 1 | Status: SHIPPED | OUTPATIENT
Start: 2020-08-10 | End: 2020-08-10

## 2020-08-10 RX ORDER — BUSPIRONE HYDROCHLORIDE 10 MG/1
10 TABLET ORAL 3 TIMES DAILY
Qty: 90 TABLET | Refills: 1 | Status: SHIPPED | OUTPATIENT
Start: 2020-08-10 | End: 2020-10-19

## 2020-08-10 RX ORDER — CLONAZEPAM 0.5 MG/1
0.5 TABLET ORAL NIGHTLY PRN
Qty: 15 TABLET | Refills: 0 | Status: SHIPPED | OUTPATIENT
Start: 2020-08-10 | End: 2020-10-19

## 2020-08-10 NOTE — TELEPHONE ENCOUNTER
"Called wife and per Aldo    \"Yeah, I think an appointment would be good.  Need to make sure BP is ok and go over everything related to his issue.\"    Appt made with Dr Pedersen    "

## 2020-08-11 NOTE — PROGRESS NOTES
Subjective   Selvin Garcia is a 54 y.o. male presenting with chief complaint of:   Chief Complaint   Patient presents with   • Insomnia       History of Present Illness :  Alone.  Here for primarily an acute issue today; insomnia.       Has multiple chronic problems to consider that might have a bearing on today's issues; not an interval appointment.       Chronic/acute problems reviewed today:   1. Anxiety Chronic/variable:  On/off anxiety tolerated somewhat.  Rx helps and interested in Rx change. Stress ongoing with marriage, older son that just got out of drug rehab and is homeless without insurance or any job and is back in the house.  His youngest son has developmental delays.      2. Insomnia, unspecified type chronic variable degree of difficulty falling and staying asleep.  Once had a sleep study with some abnormalities per Dr. Julian read this is an over read.  No witnessed sleep apneas.  Ongoing marital and significant family stress.  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  Cannot be sedated while he is trying to work.  Has to take night call on occasion.   3. Depression, unspecified depression type chronic/variable at times significant  mood swings, down moods, nervousness, difficulty with concentration to function home/work.  Others close have not been concerned.  No suicide ideation/intent.  Rx has helped.       4. Osteoarthritis, unspecified osteoarthritis type, unspecified site Chronic/stable.  Various on/off joint pains/soreness/stiffness.  Particular joint problems with knee; surgery planned this week.  No joint swelling.  Treats mainly with reduced activity, Rx listed, Tylenol. Active use NSAIDs, and recent injections.        Has an/another acute issue today: none.    The following portions of the patient's history were reviewed and updated as appropriate: allergies, current medications, past family history, past medical history, past social history, past  surgical history and problem list.    Current Outpatient Medications:   •  acetaminophen (TYLENOL) 500 MG tablet, Take 1,000 mg by mouth Every 6 (Six) Hours As Needed for Headache., Disp: , Rfl:   •  beclomethasone (QVAR) 40 MCG/ACT inhaler, Inhale 2 puffs 2 (Two) Times a Day As Needed., Disp: , Rfl:   •  cetirizine (zyrTEC) 10 MG tablet, TAKE 1 TABLET DAILY, Disp: 90 tablet, Rfl: 4  •  fluticasone (FLONASE) 50 MCG/ACT nasal spray, USE 2 SPRAYS IN EACH NOSTRIL DAILY, Disp: 48 g, Rfl: 4  •  montelukast (SINGULAIR) 10 MG tablet, Take 1 tablet by mouth Every Night. (Patient taking differently: Take 10 mg by mouth At Night As Needed.), Disp: 90 tablet, Rfl: 3  •  naproxen sodium (ALEVE) 220 MG tablet, Take 220 mg by mouth 2 (Two) Times a Day As Needed for Headache., Disp: , Rfl:   •  PROAIR  (90 Base) MCG/ACT inhaler, USE 2 INHALATIONS EVERY 4 HOURS AS NEEDED FOR WHEEZING, Disp: 25.5 g, Rfl: 0    No problems with medications.  Refills if needed done    Allergies   Allergen Reactions   • Lexapro [Escitalopram Oxalate] Itching   • Ultram [Tramadol Hcl] Itching       Review of Systems  GENERAL:  Less active/slower with limits, speed, stamina for age and L knee pain. Sleep is often a problem falling; very difficult staying asleep.  No fever now.  SKIN: No rash/skin lesion of concern unless listed above/below.   ENDO:  No syncope, near or diaphoretic sweaty spells.  HEENT: No recent head injury; diffuse headache.   No vision change.  No hearing loss.  Ears without pain/drainage.  No sore throat.  No significant nasal/sinus congestion/drainage. No epistaxis.  CHEST: No chest wall tenderness or mass.  No cough, with wheeze.  No SOB; no hemoptysis.  CV: No chest pain, palpitations, ankle edema.  GI: No heartburn, dysphagia.  No abdominal pain, diarrhea, constipation.  No rectal bleeding, or melena.    :  Voids without dysuria, or  incontinence to completion.  ORTHO: No painful/swollen joints but various on /off sore.   No sore neck or back.  No acute neck or back pain without recent injury.  NEURO: No dizziness, weakness of extremities.  No numbness/paresthesias.   PSYCH: No memory loss.  Mood down; on/off anxious, depressed but/and not suicidal.  Tries to tolerate stress.   Screening:  Mammogram: NA  Bone density: NA  Low dose CT chest:Tobacco-smoker/never NA  GI:   Colonoscopy+polyp/LH/Bod/12.9.13/5y-reminded  EGD+biop-gastric ulcer/[place]/LH/8.14.15/8w  EGD+biop/LH/[place]/10.16.15  Prostate: 7.12.19  Usual lab order  12m CBC, CMP, LIPID, TSH, PSAs, Vit D    Lab Results:  Results for orders placed or performed in visit on 07/12/19   POCT urinalysis dipstick, multipro   Result Value Ref Range    Color Yellow Yellow, Straw, Dark Yellow, Claribel    Clarity, UA Clear Clear    Glucose, UA Negative Negative, 1000 mg/dL (3+) mg/dL    Bilirubin Negative Negative    Ketones, UA Negative Negative    Specific Gravity  1.015 1.005 - 1.030    Blood, UA Trace (A) Negative    pH, Urine 5.0 5.0 - 8.0    Protein, POC Negative Negative mg/dL    Urobilinogen, UA Normal Normal    Nitrite, UA Negative Negative    Leukocytes Negative Negative       A1C:No results for input(s): HGBA1C in the last 72562 hours.  PSA:  Lab Results - Last 18 Months   Lab Units 03/21/19  0714   PSA ng/mL 0.490     CBC:  Lab Results - Last 18 Months   Lab Units 03/21/19  0714   WBC 10*3/mm3 5.18   HEMOGLOBIN g/dL 15.1   HEMATOCRIT % 43.3   PLATELETS 10*3/mm3 340      BMP/CMP:  Lab Results - Last 18 Months   Lab Units 04/10/19  1459 03/21/19  0714   SODIUM mmol/L 143 139   POTASSIUM mmol/L 4.3 4.8   CHLORIDE mmol/L 104 103   TOTAL CO2 mmol/L 25.8 27.0   GLUCOSE mg/dL 103* 80   BUN mg/dL 22* 22*   CREATININE mg/dL 0.99 1.00   EGFR IF NONAFRICN AM mL/min/1.73 79 78   EGFR IF AFRICN AM mL/min/1.73 95 94   CALCIUM mg/dL 9.3 9.0     HEPATIC:  Lab Results - Last 18 Months   Lab Units 04/10/19  1459 03/21/19  0714   ALT (SGPT) U/L 17 78*   AST (SGOT) U/L 21 93*   ALK PHOS U/L 82 68  "    THYROID:  Lab Results - Last 18 Months   Lab Units 03/21/19  0714   TSH mIU/mL 1.090       Objective   /80   Pulse 71   Temp 98.4 °F (36.9 °C)   Resp 16   Ht 167.6 cm (66\")   Wt 83.5 kg (184 lb)   SpO2 98%   BMI 29.70 kg/m²   Body mass index is 29.7 kg/m².    Physical Exam  GENERAL:  Well nourished/developed in no acute distress.   SKIN: Turgor excellent, without wound, rash, lesion.  HEENT: Normal cephalic without trauma.  Pupils equal round reactive to light. Extraocular motions full without nystagmus.  No thyromegaly, mass, tenderness, lymphadenopathy and supple.  CV: Regular rhythm.  No murmur, gallop,  edema. Posterior pulses intact.  No carotid bruits.  CHEST: No chest wall tenderness or mass.   LUNGS: Symmetric motion with clear to auscultation.   ABD: Soft, nontender without mass.   ORTHO: Symmetric extremities without swelling/point tenderness.  Full gross range of motion.  NEURO: CN 2-12 grossly intact.  Symmetric facies and UE/LE. 3-4/5 strength throughout.  Nonfocal use extremities. Speech clear.    PSYCH: Oriented x 3.  Pleasant calm, well kept.  Purposeful/directed conservation with intact short/long gross memory.      Assessment/Plan     1. Anxiety    2. Insomnia, unspecified type    3. Depression, unspecified depression type    4. Osteoarthritis, unspecified osteoarthritis type, unspecified site        Rx: reviewed/changes:  New Medications Ordered This Visit   Medications   • busPIRone (BUSPAR) 10 MG tablet     Sig: Take 1 tablet by mouth 3 (Three) Times a Day.     Dispense:  90 tablet     Refill:  1   • clonazePAM (KlonoPIN) 0.5 MG tablet     Sig: Take 1 tablet by mouth At Night As Needed for Anxiety.     Dispense:  15 tablet     Refill:  0     LAB/Testing/Referrals: reviewed/orders:   Today: none  No orders of the defined types were placed in this encounter.    Chronic/recurrent labs above or change to:   same     Discussions:   Trial buspar and prn klonopin HS  Body mass index is " 29.7 kg/m².  Patient's Body mass index is 29.7 kg/m². BMI is within normal parameters. No follow-up required..    Tobacco use reviewed:    Non-smoker  Selvin Huntleysandipkylee  reports that he has never smoked. He has never used smokeless tobacco..    There are no Patient Instructions on file for this visit.    Follow up: Return for lab;, Dr Pedersen-, as planned;.  No future appointments.

## 2020-08-17 ENCOUNTER — TELEPHONE (OUTPATIENT)
Dept: FAMILY MEDICINE CLINIC | Facility: CLINIC | Age: 55
End: 2020-08-17

## 2020-08-17 NOTE — TELEPHONE ENCOUNTER
Pt had surgery and now has constipation. Per Dr. Pedersen a verbal for either half richard or golytely

## 2020-09-04 ENCOUNTER — TELEPHONE (OUTPATIENT)
Dept: FAMILY MEDICINE CLINIC | Facility: CLINIC | Age: 55
End: 2020-09-04

## 2020-09-04 ENCOUNTER — OFFICE VISIT (OUTPATIENT)
Dept: FAMILY MEDICINE CLINIC | Facility: CLINIC | Age: 55
End: 2020-09-04

## 2020-09-04 ENCOUNTER — RESULTS ENCOUNTER (OUTPATIENT)
Dept: FAMILY MEDICINE CLINIC | Facility: CLINIC | Age: 55
End: 2020-09-04

## 2020-09-04 VITALS — TEMPERATURE: 97.3 F

## 2020-09-04 DIAGNOSIS — R50.9 FEVER, UNSPECIFIED FEVER CAUSE: ICD-10-CM

## 2020-09-04 DIAGNOSIS — R50.9 FEVER, UNSPECIFIED FEVER CAUSE: Primary | ICD-10-CM

## 2020-09-04 PROCEDURE — 99442 PR PHYS/QHP TELEPHONE EVALUATION 11-20 MIN: CPT | Performed by: NURSE PRACTITIONER

## 2020-09-04 RX ORDER — CEFDINIR 300 MG/1
300 CAPSULE ORAL 2 TIMES DAILY
Qty: 20 CAPSULE | Refills: 0 | Status: SHIPPED | OUTPATIENT
Start: 2020-09-04 | End: 2020-09-14

## 2020-09-04 RX ORDER — FLUTICASONE PROPIONATE 50 MCG
2 SPRAY, SUSPENSION (ML) NASAL DAILY
Qty: 48 G | Refills: 4 | Status: SHIPPED | OUTPATIENT
Start: 2020-09-04 | End: 2021-05-03

## 2020-09-04 NOTE — TELEPHONE ENCOUNTER
"Wife called \"he is supposed to be getting a covid test, but they dont have the orders\" to be done at [place]    Faxed order to [facility] outpatient  "

## 2020-09-04 NOTE — PROGRESS NOTES
Subjective   Chief Complaint:  Fever (last night)   Generalized Body Aches (chills)   Headache    Sore Throat    Nasal Congestion      History of Present Illness:  After obtaining verbal consent to receive care via telephone, this 54 y.o. male was evaluated via telemedicine telephone conference today for evaluation of fever reported last night, he does not know the exact number.  Generalized body aches, chills, headache, sore throat nasal congestion.  He reports he has a history of allergies and this is similar presentation.  He reports he is feeling better this afternoon.    Allergies   Allergen Reactions   • Lexapro [Escitalopram Oxalate] Itching   • Ultram [Tramadol Hcl] Itching      Current Outpatient Medications on File Prior to Visit   Medication Sig   • apixaban (Eliquis) 5 MG tablet tablet Take 5 mg by mouth 2 (Two) Times a Day.   • beclomethasone (QVAR) 40 MCG/ACT inhaler Inhale 2 puffs 2 (Two) Times a Day As Needed.   • busPIRone (BUSPAR) 10 MG tablet Take 1 tablet by mouth 3 (Three) Times a Day.   • cetirizine (zyrTEC) 10 MG tablet TAKE 1 TABLET DAILY   • clonazePAM (KlonoPIN) 0.5 MG tablet Take 1 tablet by mouth At Night As Needed for Anxiety.   • montelukast (SINGULAIR) 10 MG tablet Take 1 tablet by mouth Every Night. (Patient taking differently: Take 10 mg by mouth At Night As Needed.)   • naproxen sodium (ALEVE) 220 MG tablet Take 220 mg by mouth 2 (Two) Times a Day As Needed for Headache.   • PROAIR  (90 Base) MCG/ACT inhaler USE 2 INHALATIONS EVERY 4 HOURS AS NEEDED FOR WHEEZING   • [DISCONTINUED] acetaminophen (TYLENOL) 500 MG tablet Take 1,000 mg by mouth Every 6 (Six) Hours As Needed for Headache.   • [DISCONTINUED] fluticasone (FLONASE) 50 MCG/ACT nasal spray 2 sprays into each nostril Daily for 30 days. Administer 2 sprays in each nostril for each dose.   • [DISCONTINUED] fluticasone (FLONASE) 50 MCG/ACT nasal spray USE 2 SPRAYS IN EACH NOSTRIL DAILY   • [DISCONTINUED] polyethylene  glycol (Golytely) 236 g solution Take one-half the dose for one dose only     No current facility-administered medications on file prior to visit.       Past Medical, Surgical, Social, and Family History:  Past Medical History:   Diagnosis Date   • Abnormal CT of paranasal sinuses 08/20/2015    (Note 1 of 3)  NASAL SEPTUM:  The nasal septum is relatively midline.  MAXILLARY SINUSES:  The left maxillary sinus showed >5 m mucosal thickening and 50% opacification and the right maxillary sinus showed >5 mm mucosal thickening. The osteomeatal complex is obstructed bilaterally.   • Abnormal CT of paranasal sinuses 08/20/2015    (Note 2 of 3)  NASAL TURBINATES: The inferior turbinates showed bilateral hypertrophy. The Cherie bullosa are not present.  ETHMOID SINUSES: The left ethmoid sinus showed >3 mm mucosal thickening and complete opacification. The lamina papyracea appears intact bilaterally.   • Abnormal CT of paranasal sinuses 08/20/2015    (Note 3 of 3)  FRONTAL SINUSES:  The left frontal sinus showed complete opacification. The right frontal sinus showed complete opacification. SPENOID SINUSES: The left sphenoid sinus showed moderate mucosal thickening. The right sphenoid sinus showed moderate mucosal thickening.   • Allergic rhinitis 12/01/2015   • Chronic rhinitis    • Chronic sinusitis    • Disturbances of sensation of smell and taste 12/01/2015   • Sensorineural hearing loss, bilateral 12/01/2015   • Sleep apnea    • Subjective tinnitus 12/01/2015     Past Surgical History:   Procedure Laterality Date   • FUNCTIONAL ENDOSCOPIC SINUS SURGERY  07/26/2016   • OTHER SURGICAL HISTORY      Open Reduction-Internal Fixation   • SEPTOPLASTY      Per Dr. Urrutia 7-8 years ago     Social History     Socioeconomic History   • Marital status:      Spouse name: Kelsy ()   • Number of children: 2   • Years of education: Not on file   • Highest education level: Not on file   Tobacco Use   • Smoking status:  Never Smoker   • Smokeless tobacco: Never Used   Substance and Sexual Activity   • Alcohol use: Yes     Comment: Current some day - 1 week   • Drug use: Defer   • Sexual activity: Defer     Family History   Problem Relation Age of Onset   • Diabetes Other    • No Known Problems Mother    • No Known Problems Father      Review of Systems   Constitutional: Positive for chills, fatigue and fever.   HENT: Positive for congestion, postnasal drip, rhinorrhea and sore throat.    Respiratory: Negative for cough and shortness of breath.    Cardiovascular: Negative for chest pain and palpitations.   Musculoskeletal: Negative for arthralgias and myalgias.   Neurological: Positive for weakness and headache.     Objective   Physical Exam  Assessment/Plan   Diagnoses and all orders for this visit:    1. Fever, unspecified fever cause (Primary)  -     COVID-19,[facility],NP/OP Swab in Transport Media or ESwab 72 HR TAT - Swab, Nasopharynx; Future    Other orders  -     fluticasone (FLONASE) 50 MCG/ACT nasal spray; 2 sprays by Each Nare route Daily.  Dispense: 48 g; Refill: 4  -     cefdinir (OMNICEF) 300 MG capsule; Take 1 capsule by mouth 2 (Two) Times a Day for 10 days.  Dispense: 20 capsule; Refill: 0    Discussion:  Advised and educated plan of care.  Empiric antibiotic with the fever, until we know whether or not this is COVID, I do want to prevent a sepsis situation.  We will treat the allergies with nasal spray, will call results when available.    Time = 15 Minutes    Follow-up:  Return for Will call results and coordinate next steps..    Note e-Signed by RIRI Hall on 09/04/2020 at 1:55 PM

## 2020-09-13 NOTE — TELEPHONE ENCOUNTER
WIFE STATES THAT PATIENT RECENTLY HAD SURGERY ON 8/14. STATES THAT HE HAS BEEN HAVING FEVER, CHILLS, BODY ACHE, HEADACHE AND SORE THROAT. DENIES ANY EXPOSURE COVID.    DOES STATE THAT NIECE TESTED POSITIVE FOR COVID, WAS NEVER IN DIRECT CONTACT WITH HER BUT STATES THAT PARENTS WERE AND THEY HAD THEY CONTACT WITH EXPOSURE BEFORE COMING TO SEE PATIENT.   Implemented All Universal Safety Interventions:  [place] to call system. Call bell, personal items and telephone within reach. Instruct patient to call for assistance. Room bathroom lighting operational. Non-slip footwear when patient is off stretcher. Physically safe environment: no spills, clutter or unnecessary equipment. Stretcher in lowest position, wheels locked, appropriate side rails in place.

## 2020-10-19 ENCOUNTER — OFFICE VISIT (OUTPATIENT)
Dept: FAMILY MEDICINE CLINIC | Facility: CLINIC | Age: 55
End: 2020-10-19

## 2020-10-19 VITALS
HEIGHT: 66 IN | SYSTOLIC BLOOD PRESSURE: 122 MMHG | RESPIRATION RATE: 16 BRPM | HEART RATE: 56 BPM | WEIGHT: 184 LBS | TEMPERATURE: 98 F | BODY MASS INDEX: 29.57 KG/M2 | DIASTOLIC BLOOD PRESSURE: 82 MMHG | OXYGEN SATURATION: 98 %

## 2020-10-19 DIAGNOSIS — G47.00 INSOMNIA, UNSPECIFIED TYPE: Primary | ICD-10-CM

## 2020-10-19 DIAGNOSIS — M19.90 OSTEOARTHRITIS, UNSPECIFIED OSTEOARTHRITIS TYPE, UNSPECIFIED SITE: Chronic | ICD-10-CM

## 2020-10-19 DIAGNOSIS — R41.840 POOR CONCENTRATION: ICD-10-CM

## 2020-10-19 DIAGNOSIS — G47.10 HYPERSOMNIA: ICD-10-CM

## 2020-10-19 PROCEDURE — 99213 OFFICE O/P EST LOW 20 MIN: CPT | Performed by: FAMILY MEDICINE

## 2020-10-19 RX ORDER — OXYCODONE HYDROCHLORIDE 5 MG/1
TABLET ORAL
COMMUNITY
Start: 2020-09-10 | End: 2020-10-19

## 2020-10-19 RX ORDER — METHYLPHENIDATE HYDROCHLORIDE 18 MG/1
18 TABLET ORAL EVERY MORNING
Qty: 30 TABLET | Refills: 0 | Status: SHIPPED | OUTPATIENT
Start: 2020-10-19 | End: 2020-12-02 | Stop reason: SDUPTHER

## 2020-10-19 NOTE — PROGRESS NOTES
Subjective   Selvin Garcia is a 54 y.o. male presenting with chief complaint of:   Chief Complaint   Patient presents with   • Insomnia   • Knee Pain       History of Present Illness :  Alone.       Has multiple chronic problems to consider that might have a bearing on today's issues;  an interval appointment.       Chronic/acute problems reviewed today:   1. Insomnia, unspecified type he has had trouble sleeping on and off for years.  At the same time has been on all significant stress at home; a little better now.  He had an equivocal sleep study in the past Dr. Julian reviewed this later and said there was not enough to call with sleep apnea.  Sometimes when he does not sleep well he is a little more sleepy during the day; but has never had accidents at work or driving.  Recently he question whether can certain might help and tried to 3 days of his wife's 18 mg Concerta.  He stayed more alert during the day and therefore was sleepy and not and fell asleep nicely and stayed asleep.  Retrospective he is not sure that he has had hyperactivity or attention issues when he was in grade school high school or even up to this point; at the same time he thinks the medicine currently makes him concentrate and think better at work   2. Hypersomnia see above   3. Osteoarthritis, unspecified osteoarthritis type, unspecified site Chronic/stable.  Various on/off joint pains/soreness/stiffness.  Particular joint problems with knees.  No joint swelling.  Treats mainly with reduced activity, Rx listed, Tylenol. Active NSAIDs, and no recent injections.  He recently had a partial left knee and is using nonsteroidals fairly regularly     4. Poor concentration see above     Has an/another acute issue today: none.    The following portions of the patient's history were reviewed and updated as appropriate: allergies, current medications, past family history, past medical history, past social history, past surgical history and problem  list.      Current Outpatient Medications:   •  cetirizine (zyrTEC) 10 MG tablet, TAKE 1 TABLET DAILY, Disp: 90 tablet, Rfl: 4  •  fluticasone (FLONASE) 50 MCG/ACT nasal spray, 2 sprays by Each Nare route Daily., Disp: 48 g, Rfl: 4  •  montelukast (SINGULAIR) 10 MG tablet, Take 1 tablet by mouth Every Night. (Patient taking differently: Take 10 mg by mouth At Night As Needed.), Disp: 90 tablet, Rfl: 3  •  PROAIR  (90 Base) MCG/ACT inhaler, USE 2 INHALATIONS EVERY 4 HOURS AS NEEDED FOR WHEEZING, Disp: 25.5 g, Rfl: 0    No problems with medications.  Refills if needed done    Allergies   Allergen Reactions   • Lexapro [Escitalopram Oxalate] Itching   • Ultram [Tramadol Hcl] Itching       Review of Systems  GENERAL:  Less active/slower with limits, speed, stamina for age and L/R knee pain. Sleep is often a problem falling; very difficult staying asleep.  No fever now/recent..  SKIN: No rash/skin lesion of concern unless listed above/below.   ENDO:  No syncope, near or diaphoretic sweaty spells.  HEENT: No recent head injury; diffuse headache.   No vision change.  No hearing loss.  Ears without pain/drainage.  No sore throat.  No significant nasal/sinus congestion/drainage. No epistaxis.  CHEST: No chest wall tenderness or mass.  No cough, with wheeze.  No SOB; no hemoptysis.  CV: No chest pain, palpitations, ankle edema.  GI: No heartburn, dysphagia.  No abdominal pain, diarrhea, constipation.  No rectal bleeding, or melena.    :  Voids without dysuria, or  incontinence to completion.  ORTHO: No painful/swollen joints but various on /off sore.  No sore neck or back.  No acute neck or back pain without recent injury.  NEURO: No dizziness, weakness of extremities.  No numbness/paresthesias.   PSYCH: No memory loss.  Mood variable;  on/off anxious, depressed but/and not suicidal.  Tries to tolerate stress.   Screening:  Mammogram: NA  Bone density: NA  Low dose CT chest:Tobacco-smoker/never NA  GI:  "  Colonoscopy+polyp/LH/Bod/12.9.13/5y-reminded  EGD+biop-gastric ulcer/[place]/LH/8.14.15/8w  EGD+biop/LH/[place]/10.16.15  Prostate: 7.12.19  Usual lab order  12m CBC, CMP, LIPID, TSH, PSAs, Vit D      Lab Results:  Results for orders placed or performed in visit on 07/12/19   POCT urinalysis dipstick, multipro    Specimen: Urine   Result Value Ref Range    Color Yellow Yellow, Straw, Dark Yellow, Claribel    Clarity, UA Clear Clear    Glucose, UA Negative Negative, 1000 mg/dL (3+) mg/dL    Bilirubin Negative Negative    Ketones, UA Negative Negative    Specific Gravity  1.015 1.005 - 1.030    Blood, UA Trace (A) Negative    pH, Urine 5.0 5.0 - 8.0    Protein, POC Negative Negative mg/dL    Urobilinogen, UA Normal Normal    Nitrite, UA Negative Negative    Leukocytes Negative Negative       Objective   /82   Pulse 56   Temp 98 °F (36.7 °C)   Resp 16   Ht 167.6 cm (66\")   Wt 83.5 kg (184 lb)   SpO2 98%   BMI 29.70 kg/m²   Body mass index is 29.7 kg/m².    Recent Vitals       8/10/2020 9/4/2020 10/19/2020       BP:  124/80  --  122/82       Unable to get vitals      Pulse:  71  --  56     Temp:  98.4 °F (36.9 °C)  97.3 °F (36.3 °C)  98 °F (36.7 °C)     Weight:  83.5 kg (184 lb)  --  83.5 kg (184 lb)     BMI (Calculated):  29.7  --  29.7           Physical Exam  GENERAL:  Well nourished/developed in no acute distress.   SKIN: Turgor excellent, without wound, rash, lesion.  HEENT: Normal cephalic without trauma.  Pupils equal round reactive to light. Extraocular motions full without nystagmus.  No thyromegaly, mass, tenderness, lymphadenopathy and supple.  CV: Regular rhythm.  No murmur, gallop,  edema. Posterior pulses intact.  No carotid bruits.  CHEST: No chest wall tenderness or mass.   LUNGS: Symmetric motion with clear to auscultation.   ABD: Soft, nontender without mass.   ORTHO: Symmetric extremities without swelling/point tenderness.  Full gross range of motion.  NEURO: CN 2-12 grossly intact.  " Symmetric facies and UE/LE. 3-4/5 strength throughout.  Nonfocal use extremities. Speech clear.    PSYCH: Oriented x 3.  Pleasant calm, well kept.  Purposeful/directed conservation with intact short/long gross memory.      Assessment/Plan     1. Insomnia, unspecified type    2. Hypersomnia    3. Osteoarthritis, unspecified osteoarthritis type, unspecified site    4. Poor concentration      Discussions:   Carroll trying the Concerta and watch him closely to see the positive negative effect  Nonsteroidals looks safe for him at this point; but I cannot guarantee it will remain that way and he needs monitoring if he can use them regularly    Rx: reviewed/changes:  No orders of the defined types were placed in this encounter.    LAB/Testing/Referrals: reviewed/orders:   Today:  No orders of the defined types were placed in this encounter.    Chronic/recurrent labs above or change to:   same     Body mass index is 29.7 kg/m².  Patient's Body mass index is 29.7 kg/m². BMI is within normal parameters. No follow-up required..      Tobacco use reviewed:    Selvin Garcia  reports that he has never smoked. He has never used smokeless tobacco..     There are no Patient Instructions on file for this visit.    Follow up: No follow-ups on file.  No future appointments.    Procedures none

## 2020-10-19 NOTE — PATIENT INSTRUCTIONS
NSAID Education/Counseling:  We discussed the risks and benefits of Non-steroidal anti-inflammatories (NSAIDs), which include GI, renal, and cardiovascular toxicity. We discussed the risk for stomach ulcers, and the need to stop the drug or add a PPI if GI symptoms develop. We discussed the increased incidence of hypertension and cardiovascnlar events in patients taking NSAIDs We discussed the role the drugs may play in worsening renal disease, if present.     ########################    Pepcid for any stomach upset (while using celebrex)

## 2020-10-22 ENCOUNTER — PATIENT MESSAGE (OUTPATIENT)
Dept: FAMILY MEDICINE CLINIC | Facility: CLINIC | Age: 55
End: 2020-10-22

## 2020-10-22 RX ORDER — CELECOXIB 200 MG/1
200 CAPSULE ORAL DAILY
Qty: 30 CAPSULE | Refills: 5 | Status: SHIPPED | OUTPATIENT
Start: 2020-10-22 | End: 2020-11-02

## 2020-10-22 NOTE — TELEPHONE ENCOUNTER
From: Selvin Garcia  To: Aquilino Pedersen MD  Sent: 10/22/2020 2:45 PM CDT  Subject: Prescription Question    I went to [facility] pharmacy to  new meds and no script for Celebrex was there. My insurance may have an issue with that being a name brand drug but I understood that it was available as a generic now. Either way I would like that script and if it has to be another drug like that I am alright with that also. Thank you.

## 2020-11-02 ENCOUNTER — HOSPITAL ENCOUNTER (OUTPATIENT)
Dept: GENERAL RADIOLOGY | Facility: HOSPITAL | Age: 55
Discharge: HOME | End: 2020-11-02
Admitting: FAMILY MEDICINE

## 2020-11-02 ENCOUNTER — OFFICE VISIT (OUTPATIENT)
Dept: FAMILY MEDICINE CLINIC | Facility: CLINIC | Age: 55
End: 2020-11-02

## 2020-11-02 VITALS
WEIGHT: 178 LBS | HEIGHT: 66 IN | SYSTOLIC BLOOD PRESSURE: 130 MMHG | TEMPERATURE: 97.8 F | OXYGEN SATURATION: 98 % | DIASTOLIC BLOOD PRESSURE: 72 MMHG | HEART RATE: 90 BPM | BODY MASS INDEX: 28.61 KG/M2 | RESPIRATION RATE: 18 BRPM

## 2020-11-02 DIAGNOSIS — S99.921A INJURY OF RIGHT FOOT, INITIAL ENCOUNTER: ICD-10-CM

## 2020-11-02 DIAGNOSIS — F41.9 ANXIETY: Primary | Chronic | ICD-10-CM

## 2020-11-02 DIAGNOSIS — M79.671 PAIN OF RIGHT FOOT: ICD-10-CM

## 2020-11-02 DIAGNOSIS — M25.571 ACUTE RIGHT ANKLE PAIN: ICD-10-CM

## 2020-11-02 DIAGNOSIS — R41.840 POOR CONCENTRATION: ICD-10-CM

## 2020-11-02 PROCEDURE — 99213 OFFICE O/P EST LOW 20 MIN: CPT | Performed by: FAMILY MEDICINE

## 2020-11-02 PROCEDURE — 73630 X-RAY EXAM OF FOOT: CPT

## 2020-11-02 PROCEDURE — 73610 X-RAY EXAM OF ANKLE: CPT

## 2020-11-02 RX ORDER — KETOROLAC TROMETHAMINE 10 MG/1
10 TABLET, FILM COATED ORAL EVERY 6 HOURS PRN
Qty: 20 TABLET | Refills: 0 | Status: SHIPPED | OUTPATIENT
Start: 2020-11-02 | End: 2021-03-04

## 2020-11-02 RX ORDER — METHYLPHENIDATE HYDROCHLORIDE 18 MG/1
1 TABLET, EXTENDED RELEASE ORAL DAILY
COMMUNITY
Start: 2020-10-19 | End: 2020-11-02 | Stop reason: SDUPTHER

## 2020-11-02 NOTE — PROGRESS NOTES
"Subjective   Selvin Garcia is a 54 y.o. male presenting with chief complaint of:   Chief Complaint   Patient presents with   • Foot Pain     \"my right foot\"       History of Present Illness :  Alone.  Here for primarily an acute issue today; acute pain in his right foot and ankle.  Called me at 8 AM; walking down his stairs last night he slipped with a hyperextension movement; resultant pain primarily in the medial arch of the foot; somewhat into the medial ankle.  There was no valgus varus motions that he is aware; the Achilles tendon was not sore.  He was told to command.  He works as a  and cannot do his work right now..       Has multiple chronic problems to consider that might have a bearing on today's issues; not an interval appointment.       Chronic/acute problems reviewed today:   1. Poor concentration recently started Concerta and thinks it helps his concentration; he does not think it made him sedated or contributed to the accident.  Wife had to go to [facility] ER after she sustained a significant thigh laceration; quite an active weekend at his house..   2. Injury of right foot, initial encounter acute see above.  In fact he has never had an injury to this area before   3. Pain of right foot same   4. Acute right ankle pain same   5.      Anxiety: chronic; despite weekend events; feels ok.   Has an/another acute issue today: None.    The following portions of the patient's history were reviewed and updated as appropriate: allergies, current medications, past family history, past medical history, past social history, past surgical history and problem list.      Current Outpatient Medications:   •  cetirizine (zyrTEC) 10 MG tablet, TAKE 1 TABLET DAILY, Disp: 90 tablet, Rfl: 4  •  methylphenidate (Concerta) 18 MG CR tablet, Take 1 tablet by mouth Every Morning, Disp: 30 tablet, Rfl: 0  •  fluticasone (FLONASE) 50 MCG/ACT nasal spray, 2 sprays by Each Nare route Daily., Disp: 48 g, Rfl: 4  •  " montelukast (SINGULAIR) 10 MG tablet, Take 1 tablet by mouth Every Night. (Patient taking differently: Take 10 mg by mouth At Night As Needed.), Disp: 90 tablet, Rfl: 3  •  PROAIR  (90 Base) MCG/ACT inhaler, USE 2 INHALATIONS EVERY 4 HOURS AS NEEDED FOR WHEEZING, Disp: 25.5 g, Rfl: 0    No problems with medications.  Refills if needed done    Allergies   Allergen Reactions   • Lexapro [Escitalopram Oxalate] Itching   • Ultram [Tramadol Hcl] Itching       Review of Systems   Constitutional: Positive for activity change. Negative for appetite change, fatigue and fever.   Cardiovascular: Negative for chest pain and palpitations.   Gastrointestinal: Negative for abdominal pain, blood in stool and diarrhea.   Neurological: Negative for weakness.   Psychiatric/Behavioral: Negative for agitation, behavioral problems, decreased concentration and depressed mood. The patient is not nervous/anxious.      Lab Results:  Results for orders placed or performed in visit on 07/12/19   POCT urinalysis dipstick, multipro    Specimen: Urine   Result Value Ref Range    Color Yellow Yellow, Straw, Dark Yellow, Claribel    Clarity, UA Clear Clear    Glucose, UA Negative Negative, 1000 mg/dL (3+) mg/dL    Bilirubin Negative Negative    Ketones, UA Negative Negative    Specific Gravity  1.015 1.005 - 1.030    Blood, UA Trace (A) Negative    pH, Urine 5.0 5.0 - 8.0    Protein, POC Negative Negative mg/dL    Urobilinogen, UA Normal Normal    Nitrite, UA Negative Negative    Leukocytes Negative Negative       A1C:No results for input(s): HGBA1C in the last 82489 hours.  PSA:No results for input(s): PSA in the last 19929 hours.  CBC:No results for input(s): WBC, HGB, HCT, PLT, IRONSERUM, IRON in the last 87888 hours.   BMP/CMP:No results for input(s): NA, K, CL, CO2, GLU, BUN, CREATININE, EGFRIFNONA, EGFRIFAFRI, CALCIUM in the last 76345 hours.  HEPATIC:No results for input(s): ALT, AST, ALKPHOS, TOTAL in the last 97829 hours.  THYROID:No  "results for input(s): TSH, T3FREE, FTI in the last 91590 hours.    Invalid input(s): T4FREE, T3, T4, TEUP,  TOTALT4    Objective   /72 (BP Location: Left arm, Patient Position: Sitting, Cuff Size: Large Adult)   Pulse 90   Temp 97.8 °F (36.6 °C) (Infrared)   Resp 18   Ht 167.6 cm (66\")   Wt 80.7 kg (178 lb)   SpO2 98%   BMI 28.73 kg/m²   Body mass index is 28.73 kg/m².    Physical Exam  Constitutional:       Appearance: Normal appearance. He is normal weight.   HENT:      Head: Normocephalic and atraumatic.      Mouth/Throat:      Mouth: Mucous membranes are moist.   Eyes:      Extraocular Movements: Extraocular movements intact.      Pupils: Pupils are equal, round, and reactive to light.   Cardiovascular:      Rate and Rhythm: Normal rate and regular rhythm.   Pulmonary:      Effort: Pulmonary effort is normal.      Breath sounds: Normal breath sounds.   Abdominal:      Tenderness: There is no abdominal tenderness.   Musculoskeletal: Normal range of motion.         General: Tenderness and signs of injury present. No swelling or deformity.      Right lower leg: No edema.      Left lower leg: No edema.      Comments: He is tender over the first metatarsal second metatarsal into the anterior medial aspect of the right ankle; to touch or to attempted motion.  Other areas of the right foot were nontender   Skin:     Capillary Refill: Capillary refill takes less than 2 seconds.   Neurological:      Mental Status: He is alert. Mental status is at baseline.   Psychiatric:         Mood and Affect: Mood normal.         Behavior: Behavior normal.         Thought Content: Thought content normal.         Judgment: Judgment normal.         Assessment/Plan     1. Poor concentration    2. Injury of right foot, initial encounter    3. Pain of right foot    4. Acute right ankle pain        Rx: reviewed/changes:  New Medications Ordered This Visit   Medications   • ketorolac (TORADOL) 10 MG tablet     Sig: Take 1 tablet " by mouth Every 6 (Six) Hours As Needed for Moderate Pain .     Dispense:  20 tablet     Refill:  0       LAB/Testing/Referrals: reviewed/orders:   Today:   Orders Placed This Encounter   Procedures   • XR Ankle 3+ View Right   • XR Foot 3+ View Right     Chronic/recurrent labs above or change to:   same     Discussions:   Start plain films  No work-work note 48 hr  ? MRI ankle if not improvement  RICE   Crutches as needed  Body mass index is 28.73 kg/m².  Patient's Body mass index is 28.73 kg/m². BMI is within normal parameters. No follow-up required..      Tobacco use reviewed:    Non-smoker  Selvin Langfordmickkylee  reports that he has never smoked. He has never used smokeless tobacco..   There are no Patient Instructions on file for this visit.    Follow up: Return for will see how testing/or treatment goes and decide;, THEN, lab;, Dr Pedersen-, as planned;.  No future appointments.

## 2020-11-12 RX ORDER — CETIRIZINE HYDROCHLORIDE 10 MG/1
TABLET ORAL
Qty: 90 TABLET | Refills: 0 | Status: SHIPPED | OUTPATIENT
Start: 2020-11-12 | End: 2021-02-09

## 2020-12-02 ENCOUNTER — PATIENT MESSAGE (OUTPATIENT)
Dept: FAMILY MEDICINE CLINIC | Facility: CLINIC | Age: 55
End: 2020-12-02

## 2020-12-02 DIAGNOSIS — G47.10 HYPERSOMNIA: ICD-10-CM

## 2020-12-02 DIAGNOSIS — R41.840 POOR CONCENTRATION: ICD-10-CM

## 2020-12-02 RX ORDER — CELECOXIB 200 MG/1
200 CAPSULE ORAL DAILY
Qty: 90 CAPSULE | Refills: 1 | Status: SHIPPED | OUTPATIENT
Start: 2020-12-02 | End: 2021-02-24 | Stop reason: SDUPTHER

## 2020-12-02 RX ORDER — METHYLPHENIDATE HYDROCHLORIDE 18 MG/1
18 TABLET ORAL EVERY MORNING
Qty: 30 TABLET | Refills: 0 | Status: SHIPPED | OUTPATIENT
Start: 2020-12-02 | End: 2021-01-11 | Stop reason: SDUPTHER

## 2020-12-02 NOTE — TELEPHONE ENCOUNTER
Requested Prescriptions     Pending Prescriptions Disp Refills   • celecoxib (CeleBREX) 200 MG capsule 90 capsule 1     Sig: Take 1 capsule by mouth Daily.

## 2020-12-02 NOTE — TELEPHONE ENCOUNTER
Requested Prescriptions     Pending Prescriptions Disp Refills   • methylphenidate (Concerta) 18 MG CR tablet 30 tablet 0     Sig: Take 1 tablet by mouth Every Morning

## 2020-12-02 NOTE — TELEPHONE ENCOUNTER
From: Selvin Garcia  To: Aquilino Pedersen MD  Sent: 12/2/2020 8:29 AM CST  Subject: Prescription Question    Aquilino I am having trouble getting the Celebrex filled at [facility] pharmacy. Is it possible to be sent to [facility]? For a 90 day? If not possible can I get a script for something comparable? Also I am needing a refill on the concerta. [facility] is fine on that one. Thanks.

## 2020-12-16 ENCOUNTER — TELEPHONE (OUTPATIENT)
Dept: FAMILY MEDICINE CLINIC | Facility: CLINIC | Age: 55
End: 2020-12-16

## 2020-12-16 ENCOUNTER — PATIENT MESSAGE (OUTPATIENT)
Dept: FAMILY MEDICINE CLINIC | Facility: CLINIC | Age: 55
End: 2020-12-16

## 2020-12-16 DIAGNOSIS — M79.671 PAIN OF RIGHT FOOT: Primary | ICD-10-CM

## 2020-12-16 DIAGNOSIS — M25.571 ACUTE RIGHT ANKLE PAIN: ICD-10-CM

## 2020-12-16 NOTE — TELEPHONE ENCOUNTER
Confirm pain front of foot or ankle?     Regarding: Visit Follow-Up Question  Contact: [phone]  ----- Message from Tina Laureano MA sent at 12/16/2020  9:11 AM CST -----       ----- Message from Selvin Garcia to Aquilino Pedersen MD sent at 12/16/2020  9:54 AM -----   Aquilino that foot injury I had is still only marginally better. I am still unable to walk on it normally. What would we need to do now?

## 2020-12-16 NOTE — TELEPHONE ENCOUNTER
"Per Dr Pedersen \"Suggest MRI of that foot\"    Per patient \"The pain is totally in my foot.\"    Order placed for MRI wo contrast R foot, pt had xrays at  and order done for internal   "

## 2020-12-19 ENCOUNTER — HOSPITAL ENCOUNTER (OUTPATIENT)
Dept: MRI IMAGING | Facility: HOSPITAL | Age: 55
Discharge: HOME | End: 2020-12-19
Admitting: FAMILY MEDICINE

## 2020-12-19 DIAGNOSIS — M79.671 PAIN OF RIGHT FOOT: ICD-10-CM

## 2020-12-19 DIAGNOSIS — M25.571 ACUTE RIGHT ANKLE PAIN: ICD-10-CM

## 2020-12-19 PROCEDURE — 73718 MRI LOWER EXTREMITY W/O DYE: CPT

## 2020-12-23 ENCOUNTER — APPOINTMENT (OUTPATIENT)
Dept: MRI IMAGING | Facility: HOSPITAL | Age: 55
End: 2020-12-23

## 2020-12-29 ENCOUNTER — TELEPHONE (OUTPATIENT)
Dept: FAMILY MEDICINE CLINIC | Facility: CLINIC | Age: 55
End: 2020-12-29

## 2020-12-29 DIAGNOSIS — M79.671 PAIN OF RIGHT FOOT: Primary | ICD-10-CM

## 2020-12-29 NOTE — TELEPHONE ENCOUNTER
Aquiilno Pedersen MD  [facility] [place] [facility]             Refer to Jeff/foot; a bone in the foot has evidence of injury but no fracture     Referral done

## 2021-01-11 ENCOUNTER — PATIENT MESSAGE (OUTPATIENT)
Dept: FAMILY MEDICINE CLINIC | Facility: CLINIC | Age: 56
End: 2021-01-11

## 2021-01-11 DIAGNOSIS — R41.840 POOR CONCENTRATION: ICD-10-CM

## 2021-01-11 DIAGNOSIS — G47.10 HYPERSOMNIA: ICD-10-CM

## 2021-01-11 RX ORDER — METHYLPHENIDATE HYDROCHLORIDE 18 MG/1
18 TABLET ORAL EVERY MORNING
Qty: 30 TABLET | Refills: 0 | Status: SHIPPED | OUTPATIENT
Start: 2021-01-11 | End: 2021-02-09 | Stop reason: ALTCHOICE

## 2021-01-12 ENCOUNTER — PATIENT MESSAGE (OUTPATIENT)
Dept: FAMILY MEDICINE CLINIC | Facility: CLINIC | Age: 56
End: 2021-01-12

## 2021-01-12 ENCOUNTER — TELEPHONE (OUTPATIENT)
Dept: FAMILY MEDICINE CLINIC | Facility: CLINIC | Age: 56
End: 2021-01-12

## 2021-01-12 NOTE — TELEPHONE ENCOUNTER
We can only do 30 days local; so we should follow that rule for mail in;  Things may change with future/time; but I dont think we can now      Regarding: RE: Prescription Question  Contact: [phone]  ----- Message from Reena Martin LPN sent at 1/12/2021  7:57 AM CST -----  Pt is requesting a 90 day rx to his mail order for his concerta on his next refill next month, will this be ok?      ----- Message sent from Reena Martin LPN to Selvin Garcia at 1/12/2021  7:57 AM -----   Dr Pedersen has sent in a local prescription for 30 day, I will send message for your next refill to see if this is ok? I have never sent a 90 day of this controlled substance. Thank you yusra Valles and see what DR Pedersen advises?      ----- Message -----       From:Selvin Garcia       Sent:1/12/2021  6:22 AM CST         To:Aquilino Pedersen MD    Subject:Prescription Question    I would like for my concerta script to be submitted to [facility] for a 90 day supply    Not [facility]. Thanks.

## 2021-02-08 ENCOUNTER — TELEPHONE (OUTPATIENT)
Dept: FAMILY MEDICINE CLINIC | Facility: CLINIC | Age: 56
End: 2021-02-08

## 2021-02-08 NOTE — TELEPHONE ENCOUNTER
Regarding: Prescription Question  Contact: [phone]  ----- Message from Tina Laureano MA sent at 2/8/2021 12:57 PM CST -----       ----- Message from Selvin Garcia Randy Eugene, MD sent at 2/8/2021  1:50 PM -----   Theo has been in contact with pharm at [facility]. The script they approved was methylphenidate. If that is a suitable option in your opinion I would like to give it a try. If a 90 da y is possible that would be even better.

## 2021-02-08 NOTE — TELEPHONE ENCOUNTER
Ritalin 10 mg AM and 10 noon  Cannot do 90 days; #60 NR        Current Outpatient Medications:   •  celecoxib (CeleBREX) 200 MG capsule, Take 1 capsule by mouth Daily., Disp: 90 capsule, Rfl: 1  •  cetirizine (zyrTEC) 10 MG tablet, TAKE 1 TABLET DAILY, Disp: 90 tablet, Rfl: 0  •  fluticasone (FLONASE) 50 MCG/ACT nasal spray, 2 sprays by Each Nare route Daily., Disp: 48 g, Rfl: 4  •  ketorolac (TORADOL) 10 MG tablet, Take 1 tablet by mouth Every 6 (Six) Hours As Needed for Moderate Pain ., Disp: 20 tablet, Rfl: 0  •  methylphenidate (Concerta) 18 MG CR tablet, Take 1 tablet by mouth Every Morning, Disp: 30 tablet, Rfl: 0  •  montelukast (SINGULAIR) 10 MG tablet, Take 1 tablet by mouth Every Night. (Patient taking differently: Take 10 mg by mouth At Night As Needed.), Disp: 90 tablet, Rfl: 3  •  PROAIR  (90 Base) MCG/ACT inhaler, USE 2 INHALATIONS EVERY 4 HOURS AS NEEDED FOR WHEEZING, Disp: 25.5 g, Rfl: 0

## 2021-02-09 DIAGNOSIS — F98.8 ATTENTION DEFICIT DISORDER, UNSPECIFIED HYPERACTIVITY PRESENCE: Primary | ICD-10-CM

## 2021-02-09 RX ORDER — CETIRIZINE HYDROCHLORIDE 10 MG/1
TABLET ORAL
Qty: 90 TABLET | Refills: 3 | Status: SHIPPED | OUTPATIENT
Start: 2021-02-09 | End: 2022-02-10 | Stop reason: SDUPTHER

## 2021-02-09 RX ORDER — METHYLPHENIDATE HYDROCHLORIDE 10 MG/1
10 TABLET ORAL 2 TIMES DAILY
Qty: 60 TABLET | Refills: 0 | Status: SHIPPED | OUTPATIENT
Start: 2021-02-09 | End: 2021-03-23 | Stop reason: DRUGHIGH

## 2021-02-09 NOTE — TELEPHONE ENCOUNTER
Requested Prescriptions     Pending Prescriptions Disp Refills   • methylphenidate (Ritalin) 10 MG tablet 60 tablet 0     Sig: Take 1 tablet by mouth 2 (Two) Times a Day. At breakfast and at lunch.

## 2021-02-12 ENCOUNTER — PATIENT MESSAGE (OUTPATIENT)
Dept: FAMILY MEDICINE CLINIC | Facility: CLINIC | Age: 56
End: 2021-02-12

## 2021-02-12 ENCOUNTER — TELEPHONE (OUTPATIENT)
Dept: FAMILY MEDICINE CLINIC | Facility: CLINIC | Age: 56
End: 2021-02-12

## 2021-02-12 RX ORDER — SUCRALFATE 1 G/1
1 TABLET ORAL 4 TIMES DAILY
Qty: 120 TABLET | Refills: 0 | Status: SHIPPED | OUTPATIENT
Start: 2021-02-12 | End: 2021-09-20

## 2021-02-12 NOTE — TELEPHONE ENCOUNTER
From: Selvin Garcia  To: Aquilino Pedersen MD  Sent: 2/12/2021 6:25 AM CST  Subject: Prescription Question    Scripts are all fine. Thank you.

## 2021-02-12 NOTE — TELEPHONE ENCOUNTER
Regarding: Prescription Question  Contact: [phone]  ----- Message from Tina Laureano MA sent at 2/12/2021 10:06 AM CST -----       ----- Message from Selvin Garcia to Aquilino Pedersen MD sent at 2/11/2021  7:41 AM -----   Aquilino is it possible to get a refill for the Carafate that I used to take? It worked well for me then and I am having similar issues now. Nothing serious but I don't want it to get that way either. Also I mistakenly said that methylphenidate was my approved medicine when it is dexymethylphenidate. I certainly do not know the difference but just saw the mistake I made in drug name.

## 2021-02-12 NOTE — TELEPHONE ENCOUNTER
Please check dosing is correct.  I did not find an old rx to reorder.    Requested Prescriptions     Pending Prescriptions Disp Refills   • sucralfate (Carafate) 1 g tablet 120 tablet 0     Sig: Take 1 tablet by mouth 4 (Four) Times a Day.

## 2021-02-13 NOTE — TELEPHONE ENCOUNTER
See if he has issue next week remaining    Regarding: Prescription Question  Contact: [phone]  ----- Message from Tina Laureano MA sent at 2/12/2021 10:06 AM CST -----       ----- Message from Selvin Garcia to Aquilino Pedersen MD sent at 2/11/2021  7:41 AM -----   Aquilino is it possible to get a refill for the Carafate that I used to take? It worked well for me then and I am having similar issues now. Nothing serious but I don't want it to get that way either. Also I mistakenly said that methylphenidate was my approved medicine when it is dexymethylphenidate. I certainly do not know the difference but just saw the mistake I made in drug name.

## 2021-02-24 RX ORDER — CELECOXIB 200 MG/1
200 CAPSULE ORAL DAILY
Qty: 90 CAPSULE | Refills: 1 | Status: SHIPPED | OUTPATIENT
Start: 2021-02-24 | End: 2021-03-04

## 2021-02-25 ENCOUNTER — TELEPHONE (OUTPATIENT)
Dept: FAMILY MEDICINE CLINIC | Facility: CLINIC | Age: 56
End: 2021-02-25

## 2021-03-04 ENCOUNTER — OFFICE VISIT (OUTPATIENT)
Dept: FAMILY MEDICINE CLINIC | Facility: CLINIC | Age: 56
End: 2021-03-04

## 2021-03-04 VITALS
TEMPERATURE: 98 F | HEIGHT: 66 IN | RESPIRATION RATE: 18 BRPM | OXYGEN SATURATION: 96 % | DIASTOLIC BLOOD PRESSURE: 70 MMHG | WEIGHT: 187.8 LBS | BODY MASS INDEX: 30.18 KG/M2 | SYSTOLIC BLOOD PRESSURE: 116 MMHG | HEART RATE: 88 BPM

## 2021-03-04 DIAGNOSIS — M79.621 PAIN IN RIGHT AXILLA: ICD-10-CM

## 2021-03-04 PROCEDURE — 99213 OFFICE O/P EST LOW 20 MIN: CPT | Performed by: FAMILY MEDICINE

## 2021-03-04 RX ORDER — BUSPIRONE HYDROCHLORIDE 10 MG/1
TABLET ORAL
COMMUNITY
Start: 2020-12-30 | End: 2021-03-04

## 2021-03-04 RX ORDER — MELOXICAM 15 MG/1
15 TABLET ORAL DAILY
COMMUNITY
Start: 2021-02-17 | End: 2021-10-25

## 2021-03-04 RX ORDER — METHYLPHENIDATE HYDROCHLORIDE 18 MG/1
TABLET, EXTENDED RELEASE ORAL
COMMUNITY
Start: 2021-01-11 | End: 2021-03-04

## 2021-03-04 NOTE — PROGRESS NOTES
Subjective   Selvin Garcia is a 55 y.o. male presenting with chief complaint of:   Chief Complaint   Patient presents with   • nodules under arm     impact hit about a week ago and pain has not went away        History of Present Illness :  Alone.  Here for primarily an acute issue today; sore/nodule R axilla; better last 1-2 days.       Has multiple chronic problems to consider that might have a bearing on today's issues; not an interval appointment.       Chronic/acute problems reviewed today:   1. Pain in right axilla      Has an/another acute issue today: none.    The following portions of the patient's history were reviewed and updated as appropriate: allergies, current medications, past family history, past medical history, past social history, past surgical history and problem list.      Current Outpatient Medications:   •  cetirizine (zyrTEC) 10 MG tablet, TAKE 1 TABLET DAILY, Disp: 90 tablet, Rfl: 3  •  meloxicam (MOBIC) 15 MG tablet, , Disp: , Rfl:   •  methylphenidate (Ritalin) 10 MG tablet, Take 1 tablet by mouth 2 (Two) Times a Day. At breakfast and at lunch., Disp: 60 tablet, Rfl: 0  •  sucralfate (Carafate) 1 g tablet, Take 1 tablet by mouth 4 (Four) Times a Day., Disp: 120 tablet, Rfl: 0  •  fluticasone (FLONASE) 50 MCG/ACT nasal spray, 2 sprays by Each Nare route Daily., Disp: 48 g, Rfl: 4  •  montelukast (SINGULAIR) 10 MG tablet, Take 1 tablet by mouth Every Night. (Patient taking differently: Take 10 mg by mouth At Night As Needed.), Disp: 90 tablet, Rfl: 3  •  PROAIR  (90 Base) MCG/ACT inhaler, USE 2 INHALATIONS EVERY 4 HOURS AS NEEDED FOR WHEEZING, Disp: 25.5 g, Rfl: 0    No problems with medications.    Allergies   Allergen Reactions   • Lexapro [Escitalopram Oxalate] Itching   • Ultram [Tramadol Hcl] Itching       Review of Systems  GENERAL:  Less active/slower with limits, speed, stamina for age and L knee pain. Sleep is often a problem falling; very difficult staying asleep.  No  fever now/recent (no night sweats).  SKIN: No rash/skin lesion of concern unless listed above/below.   ENDO:  No syncope, near or diaphoretic sweaty spells.  HEENT: No recent head injury; diffuse headache.   No vision change.  No hearing loss.  Ears without pain/drainage.  No sore throat.  No significant nasal/sinus congestion/drainage. No epistaxis.  CHEST: No chest wall tenderness or mass.  No cough, with wheeze.  No SOB; no hemoptysis.  CV: No chest pain, palpitations, ankle edema.  GI: No heartburn, dysphagia.  No abdominal pain, diarrhea, constipation.  No rectal bleeding, or melena.    :  Voids without dysuria, or  incontinence to completion.  ORTHO: No painful/swollen joints but various on /off sore.  No sore neck or back.  No acute neck or back pain without recent injury.  NEURO: No dizziness, weakness of extremities.  No numbness/paresthesias.   PSYCH: No memory loss.  Mood down; on/off anxious, depressed but/and not suicidal.  Tries to tolerate stress.   Screening:  Mammogram: NA  Bone density: NA  Low dose CT chest:Tobacco-smoker/never NA  GI:   Colonoscopy+polyp/LH/Bod/12.9.13/5y-reminded  EGD+biop-gastric ulcer/Susanna/LH/8.14.15/8w  EGD+biop/LH/Susanna/10.16.15  Prostate: 7.12.19  Usual lab order  12m CBC, CMP, LIPID, TSH, PSAs, Vit D      Data reviewed:   Recent admit/ER/MD visits: none    Lab Results:  Results for orders placed or performed in visit on 07/12/19   POCT urinalysis dipstick, multipro    Specimen: Urine   Result Value Ref Range    Color Yellow Yellow, Straw, Dark Yellow, Claribel    Clarity, UA Clear Clear    Glucose, UA Negative Negative, 1000 mg/dL (3+) mg/dL    Bilirubin Negative Negative    Ketones, UA Negative Negative    Specific Gravity  1.015 1.005 - 1.030    Blood, UA Trace (A) Negative    pH, Urine 5.0 5.0 - 8.0    Protein, POC Negative Negative mg/dL    Urobilinogen, UA Normal Normal    Nitrite, UA Negative Negative    Leukocytes Negative Negative       A1C:No results for  "input(s): HGBA1C in the last 18299 hours.  LIPID:No results for input(s): CHLPL, LDL, HDL, TRIG in the last 18598 hours.  PSA:No results for input(s): PSA in the last 21295 hours.  CBC:No results for input(s): WBC, HGB, HCT, PLT, IRONSERUM, IRON in the last 85945 hours.   BMP/CMP:No results for input(s): NA, K, CL, CO2, GLU, BUN, CREATININE, EGFRIFNONA, EGFRIFAFRI, CALCIUM in the last 35459 hours.  HEPATIC:No results for input(s): ALT, AST, ALKPHOS, TOTAL in the last 60754 hours.    Invalid input(s): HEPATITSC  THYROID:No results for input(s): TSH, FREET4, FTI in the last 75515 hours.    Invalid input(s): FREET3, T3, T4, TEUP,  TOTALT4    Objective   /70   Pulse 88   Temp 98 °F (36.7 °C) (Infrared)   Resp 18   Ht 167.6 cm (66\")   Wt 85.2 kg (187 lb 12.8 oz)   SpO2 96%   BMI 30.31 kg/m²   Body mass index is 30.31 kg/m².    Recent Vitals       10/19/2020 11/2/2020 3/4/2021       BP:  122/82  130/72  116/70     Pulse:  56  90  88     Temp:  98 °F (36.7 °C)  97.8 °F (36.6 °C)  98 °F (36.7 °C)     Weight:  83.5 kg (184 lb)  80.7 kg (178 lb)  85.2 kg (187 lb 12.8 oz)     BMI (Calculated):  29.7  28.7  30.3           Physical Exam  GENERAL:  Well nourished/developed in no acute distress.   SKIN: Turgor excellent, without wound, rash, lesion.  HEENT: Normal cephalic without trauma.  Pupils equal round reactive to light. Extraocular motions full without nystagmus.  No thyromegaly, mass, tenderness, lymphadenopathy and supple.  CV: Regular rhythm.  No murmur, gallop,  edema.  CHEST: No chest wall tenderness or mass.  No axillary tenderness/mass.   LUNGS: Symmetric motion with clear to auscultation.   ABD: Soft, nontender without mass.   ORTHO: Symmetric extremities without swelling/point tenderness.  Full gross range of motion.  NEURO: CN 2-12 grossly intact.  Symmetric facies and UE/LE. 3-4/5 strength throughout.  Nonfocal use extremities. Speech clear.    PSYCH: Oriented x 3.  Pleasant calm, well kept.  " Purposeful/directed conservation with intact short/long gross memory.      Assessment/Plan     1. Pain in right axilla      Discussion:  Glad resolved  If returns; return    Medical decision issues:   Data review above:   Rx: reviewed and decisions:   Same Rx for now     Orders placed:   LAB/Testing/Referrals: reviewed/orders:   Today:   No orders of the defined types were placed in this encounter.    Chronic/recurrent labs above or change to:   same     Health maintenance:   Body mass index is 30.31 kg/m².  Patient's Body mass index is 30.31 kg/m². BMI is within normal parameters. No follow-up required..    Tobacco use reviewed:  Non-smoker    Patient Instructions   If todays symptoms worsen/persist let us know.     ########################        Follow up: Return for lab;, Dr Pedersen-, as planned;.  Future Appointments   Date Time Provider Department Center   3/15/2021  2:30 PM Orlando Trinidad PA [facility]

## 2021-03-10 ENCOUNTER — TRANSCRIBE ORDERS (OUTPATIENT)
Dept: LAB | Facility: HOSPITAL | Age: 56
End: 2021-03-10

## 2021-03-22 ENCOUNTER — TELEPHONE (OUTPATIENT)
Dept: FAMILY MEDICINE CLINIC | Facility: CLINIC | Age: 56
End: 2021-03-22

## 2021-03-22 DIAGNOSIS — Z12.5 ENCOUNTER FOR SPECIAL SCREENING EXAMINATION FOR NEOPLASM OF PROSTATE: ICD-10-CM

## 2021-03-22 DIAGNOSIS — R74.8 ABNORMAL LIVER ENZYMES: ICD-10-CM

## 2021-03-22 DIAGNOSIS — R41.840 POOR CONCENTRATION: Primary | ICD-10-CM

## 2021-03-22 DIAGNOSIS — N40.0 PROSTATISM: Chronic | ICD-10-CM

## 2021-03-22 DIAGNOSIS — Z00.00 WELLNESS EXAMINATION: ICD-10-CM

## 2021-03-22 NOTE — TELEPHONE ENCOUNTER
Sent pt a detailed my chart message to follow  Selvin, are you expecting to go to the [facility]? Not sure I find a conversation talking about next med change, but I do see that your past due for your annual fasting labs, can you come in soon and get these done as most meds need monitored by liver and kidney functions, just wellness in general. 1- medication expected? 2- needs annual fasting labs asap,  Thank you Reena     Will await his response to send to provider

## 2021-03-22 NOTE — TELEPHONE ENCOUNTER
Reena I am not sure what his next thoughts are    From the chart send the lab but down or ask again for reinvolved worries in wanting to go with this

## 2021-03-23 DIAGNOSIS — J32.9 CHRONIC SINUSITIS, UNSPECIFIED LOCATION: Primary | ICD-10-CM

## 2021-03-23 RX ORDER — METHYLPREDNISOLONE 4 MG/1
TABLET ORAL
Qty: 1 EACH | Refills: 0 | Status: SHIPPED | OUTPATIENT
Start: 2021-03-23 | End: 2021-03-29

## 2021-03-23 RX ORDER — METHYLPHENIDATE HYDROCHLORIDE 18 MG/1
18 TABLET ORAL EVERY MORNING
Qty: 30 TABLET | Refills: 0 | Status: SHIPPED | OUTPATIENT
Start: 2021-03-23 | End: 2021-04-28 | Stop reason: DRUGHIGH

## 2021-03-23 RX ORDER — AZITHROMYCIN 250 MG/1
TABLET, FILM COATED ORAL
Qty: 6 TABLET | Refills: 0 | OUTPATIENT
Start: 2021-03-23 | End: 2021-03-25

## 2021-03-23 NOTE — TELEPHONE ENCOUNTER
Regarding: Non-Urgent Medical Question  ----- Message from Reena Martin LPN sent at 3/23/2021 10:35 AM CDT -----  See marta?     ----- Message from Selvin Garcia to Aquilino Pedersen MD sent at 3/23/2021 10:04 AM -----   Dr. Pedersen,  I have a sinus infection.  Head pressure is explosive.  My sinus pressure point spots on my cheek bones hurt intensively.    I have been fighting this off & on for weeks self medicating with old amoxicillin but know that has made it improve a little but I am not treating it correctly     Could a real prescription be ordered or do I have to be seen ?    It is causing my days to stop when it gets this intense    Sincerely Selvin Duncan needs to become new pharmacy

## 2021-03-23 NOTE — TELEPHONE ENCOUNTER
concerta 18 printed; to  with his labs tomorrow  Can increase if 18 is not enough appears he has been on 20 below        Current Outpatient Medications:   •  cetirizine (zyrTEC) 10 MG tablet, TAKE 1 TABLET DAILY, Disp: 90 tablet, Rfl: 3  •  fluticasone (FLONASE) 50 MCG/ACT nasal spray, 2 sprays by Each Nare route Daily., Disp: 48 g, Rfl: 4  •  meloxicam (MOBIC) 15 MG tablet, , Disp: , Rfl:   •  methylphenidate (Ritalin) 10 MG tablet, Take 1 tablet by mouth 2 (Two) Times a Day. At breakfast and at lunch., Disp: 60 tablet, Rfl: 0  •  montelukast (SINGULAIR) 10 MG tablet, Take 1 tablet by mouth Every Night. (Patient taking differently: Take 10 mg by mouth At Night As Needed.), Disp: 90 tablet, Rfl: 3  •  PROAIR  (90 Base) MCG/ACT inhaler, USE 2 INHALATIONS EVERY 4 HOURS AS NEEDED FOR WHEEZING, Disp: 25.5 g, Rfl: 0  •  sucralfate (Carafate) 1 g tablet, Take 1 tablet by mouth 4 (Four) Times a Day., Disp: 120 tablet, Rfl: 0

## 2021-03-23 NOTE — TELEPHONE ENCOUNTER
Spoke to pt and fasting lab appt scheduled and advised can  concerta rx printed at  and lab orders placed, wants zpack and medrol to julio, pt does have b/p cuff at home and able to monitor his blood pressure

## 2021-03-23 NOTE — TELEPHONE ENCOUNTER
Selvin Garcia Randy Eugene, MD        9:07 AM  1.   Generic concerta is my preferred choice   [facility]  (needs dose?)  2.  Off tomorrow  May I come in first  thing in the morning  (pt has a sick message and willing to be seen)

## 2021-03-23 NOTE — TELEPHONE ENCOUNTER
If he is got ability to do home blood pressures he needs to make sure his blood pressure is doing okay    He may have a Z-Cristopher and a Medrol Dosepak

## 2021-03-24 ENCOUNTER — LAB (OUTPATIENT)
Dept: FAMILY MEDICINE CLINIC | Facility: CLINIC | Age: 56
End: 2021-03-24

## 2021-03-25 ENCOUNTER — PATIENT MESSAGE (OUTPATIENT)
Dept: FAMILY MEDICINE CLINIC | Facility: CLINIC | Age: 56
End: 2021-03-25

## 2021-03-25 ENCOUNTER — TELEPHONE (OUTPATIENT)
Dept: FAMILY MEDICINE CLINIC | Facility: CLINIC | Age: 56
End: 2021-03-25

## 2021-03-25 PROBLEM — R73.01 ELEVATED FASTING GLUCOSE: Status: ACTIVE | Noted: 2021-03-25

## 2021-03-25 RX ORDER — AZITHROMYCIN 250 MG/1
TABLET, FILM COATED ORAL
Qty: 6 TABLET | Refills: 0 | Status: SHIPPED | OUTPATIENT
Start: 2021-03-25 | End: 2021-04-28

## 2021-03-25 NOTE — TELEPHONE ENCOUNTER
Be sure he knows his A1c was great; but his fasting sugar was slightly over normal and something to keep up with

## 2021-04-28 ENCOUNTER — TELEPHONE (OUTPATIENT)
Dept: FAMILY MEDICINE CLINIC | Facility: CLINIC | Age: 56
End: 2021-04-28

## 2021-04-28 ENCOUNTER — PATIENT MESSAGE (OUTPATIENT)
Dept: FAMILY MEDICINE CLINIC | Facility: CLINIC | Age: 56
End: 2021-04-28

## 2021-04-28 DIAGNOSIS — F98.8 ATTENTION DEFICIT DISORDER, UNSPECIFIED HYPERACTIVITY PRESENCE: ICD-10-CM

## 2021-04-28 DIAGNOSIS — F98.8 ATTENTION DEFICIT DISORDER, UNSPECIFIED HYPERACTIVITY PRESENCE: Primary | ICD-10-CM

## 2021-04-28 RX ORDER — METHYLPHENIDATE HYDROCHLORIDE 27 MG/1
27 TABLET ORAL EVERY MORNING
Qty: 30 TABLET | Refills: 0 | Status: SHIPPED | OUTPATIENT
Start: 2021-04-28 | End: 2021-04-29

## 2021-04-28 NOTE — TELEPHONE ENCOUNTER
Regarding: Prescription Question  Contact: [phone]  ----- Message from Reena Martin LPN sent at 4/28/2021  9:21 AM CDT -----  My chart message  asking for increase for concerta      ----- Message from Selvin Garcia to Aquilino Pedersen MD sent at 4/28/2021 10:05 AM -----   Is it possible for a generic concerta refill? I have marginally good results from the current dose of 18 mg and wonder if a dosage increase is even possible. I don't know what dosages there even are but am inquiring. Thank you.

## 2021-04-28 NOTE — TELEPHONE ENCOUNTER
Regarding: Prescription Question  Contact: [phone]  ----- Message from Reena Martin LPN sent at 4/28/2021  9:21 AM CDT -----  My chart message  asking for increase for concerta      ----- Message from Selvin Garcia to Aquilino Pedersen MD sent at 4/28/2021 10:05 AM -----   Is it possible for a generic concerta refill? I have marginally good results from the current dose of 18 mg and wonder if a dosage increase is even possible. I don't know what dosages there even are but am inquiring. Thank you.

## 2021-04-30 ENCOUNTER — TELEPHONE (OUTPATIENT)
Dept: FAMILY MEDICINE CLINIC | Facility: CLINIC | Age: 56
End: 2021-04-30

## 2021-04-30 RX ORDER — METHYLPHENIDATE HYDROCHLORIDE 27 MG/1
27 TABLET ORAL EVERY MORNING
Qty: 30 TABLET | Refills: 0 | Status: SHIPPED | OUTPATIENT
Start: 2021-04-30 | End: 2021-06-03 | Stop reason: SDUPTHER

## 2021-04-30 NOTE — TELEPHONE ENCOUNTER
Caller: Dr. Cintron office    Relationship to patient:     Best call back number:  [phone]    Patient is needing: Office note from 11/2/2020 faxed.    Fax: [phone]

## 2021-05-03 ENCOUNTER — OFFICE VISIT (OUTPATIENT)
Dept: OTOLARYNGOLOGY | Facility: CLINIC | Age: 56
End: 2021-05-03

## 2021-05-03 VITALS
HEIGHT: 66 IN | WEIGHT: 183 LBS | TEMPERATURE: 98.1 F | BODY MASS INDEX: 29.41 KG/M2 | HEART RATE: 68 BPM | SYSTOLIC BLOOD PRESSURE: 110 MMHG | DIASTOLIC BLOOD PRESSURE: 74 MMHG

## 2021-05-03 DIAGNOSIS — J30.2 SEASONAL ALLERGIC RHINITIS, UNSPECIFIED TRIGGER: ICD-10-CM

## 2021-05-03 DIAGNOSIS — J32.8 OTHER CHRONIC SINUSITIS: Primary | ICD-10-CM

## 2021-05-03 PROCEDURE — 99214 OFFICE O/P EST MOD 30 MIN: CPT | Performed by: EMERGENCY MEDICINE

## 2021-05-03 RX ORDER — FLUTICASONE PROPIONATE 50 MCG
2 SPRAY, SUSPENSION (ML) NASAL DAILY
Qty: 16 G | Refills: 11 | Status: SHIPPED | OUTPATIENT
Start: 2021-05-03 | End: 2022-02-11 | Stop reason: SDUPTHER

## 2021-05-03 NOTE — PROGRESS NOTES
RIRI Whitley     Chief Complaint   Patient presents with   • Allergic Rhinitis          HPI  Selvin Garcia is a  55 y.o. male who is here for follow up. He is status post bilateral maxillary antrostomies, bilateral ethmoidectomies, endoscopic frontal sinusotomies and sphenoidotomies on 2016.  He is status post septoplasty about 8 years prior to this.  He denies any current symptoms and reports as long as he keeps up with his zyrtec and flonase he does well.  He states his allergies have been less severe this year than in years past.            Vital Signs:   Temp:  [98.1 °F (36.7 °C)] 98.1 °F (36.7 °C)  Heart Rate:  [68] 68  BP: (110)/(74) 110/74    Physical Exam             Assessment/Plan    Assessment:   1. Other chronic sinusitis    2. Seasonal allergic rhinitis, unspecified trigger        Plan:        Continue current management plan.     New Medications Ordered This Visit   Medications   • fluticasone (FLONASE) 50 MCG/ACT nasal spray     Si sprays into the nostril(s) as directed by provider Daily.     Dispense:  16 g     Refill:  11          Return in about 1 year (around 5/3/2022) for Follow up with me when Dr. Urrutia in clinic.         RIRI Whitley  21  16:31 CDT

## 2021-05-03 NOTE — PATIENT INSTRUCTIONS
CONTACT INFORMATION:  The main office phone number is [phone]. For emergencies after hours and on weekends, this number will convert over to our answering service and the on call provider will answer. Please try to keep non emergent phone calls/ questions to office hours 9am-5pm Monday through Friday.     Tapgage  As an alternative, you can sign up and use the Epic MyChart system for more direct and quicker access for non emergent questions/ problems.  [facility] Tapgage allows you to send messages to your doctor, view your test results, renew your prescriptions, schedule appointments, and more. To sign up, go to LOFTY and click on the Sign Up Now link in the New User? box. Enter your Tapgage Activation Code exactly as it appears below along with the last four digits of your Social Security Number and your Date of Birth () to complete the sign-up process. If you do not sign up before the expiration date, you must request a new code.    Tapgage Activation Code: Activation code not generated  Current Tapgage Status: Active    If you have questions, you can email [email or web address] or call [phone] to talk to our Tapgage staff. Remember, Tapgage is NOT to be used for urgent needs. For medical emergencies, dial 911.

## 2021-05-11 ENCOUNTER — OFFICE VISIT (OUTPATIENT)
Dept: FAMILY MEDICINE CLINIC | Facility: CLINIC | Age: 56
End: 2021-05-11

## 2021-05-11 VITALS
TEMPERATURE: 98.4 F | HEIGHT: 66 IN | SYSTOLIC BLOOD PRESSURE: 134 MMHG | DIASTOLIC BLOOD PRESSURE: 72 MMHG | OXYGEN SATURATION: 95 % | WEIGHT: 181 LBS | RESPIRATION RATE: 18 BRPM | BODY MASS INDEX: 29.09 KG/M2 | HEART RATE: 89 BPM
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
## 2021-05-11 DIAGNOSIS — S89.91XA INJURY OF RIGHT KNEE, INITIAL ENCOUNTER: ICD-10-CM

## 2021-05-11 DIAGNOSIS — M25.561 ACUTE PAIN OF RIGHT KNEE: ICD-10-CM

## 2021-05-11 PROCEDURE — 99213 OFFICE O/P EST LOW 20 MIN: CPT | Performed by: FAMILY MEDICINE

## 2021-05-11 RX ORDER — CELECOXIB 200 MG/1
CAPSULE ORAL
COMMUNITY
Start: 2021-05-10 | End: 2021-08-09

## 2021-05-11 NOTE — PROGRESS NOTES
Subjective   Selvin Garcia is a 55 y.o. male presenting with chief complaint of:   Chief Complaint   Patient presents with   • Knee Pain     needs work excuse for knee pain       History of Present Illness :  With wife.  Here for primarily an acute issue today; injury to R knee; knee with previous interventions.       Has multiple chronic problems to consider that might have a bearing on today's issues; not an interval appointment.       Fell through rotten board into cistern 5.10.21 (on work duty)  L leg fell through; R knee got twisted.    Went to [facility] outpt 5.10.21; xray only  No MRIs yet  [facility] apt 5.13.21    Chronic/acute problems reviewed today:   1. Acute pain of right knee    2. Injury of right knee, initial encounter    3. Work related injury      Has an/another acute issue today: none.    The following portions of the patient's history were reviewed and updated as appropriate: allergies, current medications, past family history, past medical history, past social history, past surgical history and problem list.      Current Outpatient Medications:   •  celecoxib (CeleBREX) 200 MG capsule, , Disp: , Rfl:   •  cetirizine (zyrTEC) 10 MG tablet, TAKE 1 TABLET DAILY, Disp: 90 tablet, Rfl: 3  •  fluticasone (FLONASE) 50 MCG/ACT nasal spray, 2 sprays into the nostril(s) as directed by provider Daily., Disp: 16 g, Rfl: 11  •  meloxicam (MOBIC) 15 MG tablet, , Disp: , Rfl:   •  methylphenidate (Concerta) 27 MG CR tablet, Take 1 tablet by mouth Every Morning, Disp: 30 tablet, Rfl: 0  •  montelukast (SINGULAIR) 10 MG tablet, Take 1 tablet by mouth Every Night. (Patient taking differently: Take 10 mg by mouth At Night As Needed.), Disp: 90 tablet, Rfl: 3  •  PROAIR  (90 Base) MCG/ACT inhaler, USE 2 INHALATIONS EVERY 4 HOURS AS NEEDED FOR WHEEZING, Disp: 25.5 g, Rfl: 0  •  sucralfate (Carafate) 1 g tablet, Take 1 tablet by mouth 4 (Four) Times a Day., Disp: 120 tablet, Rfl: 0    No problems with  medications.  Refills if needed done    Allergies   Allergen Reactions   • Lexapro [Escitalopram Oxalate] Itching   • Ultram [Tramadol Hcl] Itching       Review of Systems   Constitutional: Positive for activity change. Negative for appetite change, fatigue and fever.   Musculoskeletal: Positive for gait problem.        R knee pain rest; worse movement.  Not localized.      Lab Results:  Results for orders placed or performed in visit on 03/24/21   Comprehensive Metabolic Panel    Specimen: Blood   Result Value Ref Range    Glucose 110 (H) 65 - 99 mg/dL    BUN 20 6 - 20 mg/dL    Creatinine 0.94 0.76 - 1.27 mg/dL    eGFR Non African Am 83 >60 mL/min/1.73    eGFR African Am 101 >60 mL/min/1.73    BUN/Creatinine Ratio 21.3 7.0 - 25.0    Sodium 140 136 - 145 mmol/L    Potassium 4.6 3.5 - 5.2 mmol/L    Chloride 106 98 - 107 mmol/L    Total CO2 26.4 22.0 - 29.0 mmol/L    Calcium 9.1 8.6 - 10.5 mg/dL    Total Protein 6.7 6.0 - 8.5 g/dL    Albumin 4.40 3.50 - 5.20 g/dL    Globulin 2.3 gm/dL    A/G Ratio 1.9 g/dL    Total Bilirubin 0.4 0.0 - 1.2 mg/dL    Alkaline Phosphatase 73 39 - 117 U/L    AST (SGOT) 18 1 - 40 U/L    ALT (SGPT) 13 1 - 41 U/L   PSA Screen    Specimen: Blood   Result Value Ref Range    PSA 0.761 0.000 - 4.000 ng/mL   Lipid Panel With LDL / HDL Ratio    Specimen: Blood   Result Value Ref Range    Total Cholesterol 194 0 - 200 mg/dL    Triglycerides 47 0 - 150 mg/dL    HDL Cholesterol 56 40 - 60 mg/dL    VLDL Cholesterol [place] 9 5 - 40 mg/dL    LDL Chol Calc (NIH) 129 (H) 0 - 100 mg/dL    LDL/HDL RATIO 2.30    TSH    Specimen: Blood   Result Value Ref Range    TSH 0.825 0.270 - 4.200 uIU/mL   Vitamin D 25 Hydroxy    Specimen: Blood   Result Value Ref Range    25 Hydroxy, Vitamin D 37.4 30.0 - 100.0 ng/ml   Hemoglobin A1c   Result Value Ref Range    Hemoglobin A1C 4.70 (L) 4.80 - 5.60 %   Written Authorization   Result Value Ref Range    Written Authorization Comment    CBC & Differential    Specimen: Blood  "  Result Value Ref Range    WBC 7.21 3.40 - 10.80 10*3/mm3    RBC 5.32 4.14 - 5.80 10*6/mm3    Hemoglobin 15.8 13.0 - 17.7 g/dL    Hematocrit 46.1 37.5 - 51.0 %    MCV 86.7 79.0 - 97.0 fL    MCH 29.7 26.6 - 33.0 pg    MCHC 34.3 31.5 - 35.7 g/dL    RDW 12.9 12.3 - 15.4 %    Platelets 362 140 - 450 10*3/mm3    Neutrophil Rel % 81.3 (H) 42.7 - 76.0 %    Lymphocyte Rel % 12.8 (L) 19.6 - 45.3 %    Monocyte Rel % 5.0 5.0 - 12.0 %    Eosinophil Rel % 0.0 (L) 0.3 - 6.2 %    Basophil Rel % 0.3 0.0 - 1.5 %    Neutrophils Absolute 5.87 1.70 - 7.00 10*3/mm3    Lymphocytes Absolute 0.92 0.70 - 3.10 10*3/mm3    Monocytes Absolute 0.36 0.10 - 0.90 10*3/mm3    Eosinophils Absolute 0.00 0.00 - 0.40 10*3/mm3    Basophils Absolute 0.02 0.00 - 0.20 10*3/mm3    Immature Granulocyte Rel % 0.6 (H) 0.0 - 0.5 %    Immature Grans Absolute 0.04 0.00 - 0.05 10*3/mm3    nRBC 0.0 0.0 - 0.2 /100 WBC       A1C:  Lab Results - Last 18 Months   Lab Units 03/24/21  0722   HEMOGLOBIN A1C % 4.70*     PSA:  Lab Results - Last 18 Months   Lab Units 03/24/21  0722   PSA ng/mL 0.761     CBC:  Lab Results - Last 18 Months   Lab Units 03/24/21  0722   WBC 10*3/mm3 7.21   HEMOGLOBIN g/dL 15.8   HEMATOCRIT % 46.1   PLATELETS 10*3/mm3 362      BMP/CMP:  Lab Results - Last 18 Months   Lab Units 03/24/21  0722   SODIUM mmol/L 140   POTASSIUM mmol/L 4.6   CHLORIDE mmol/L 106   TOTAL CO2 mmol/L 26.4   GLUCOSE mg/dL 110*   BUN mg/dL 20   CREATININE mg/dL 0.94   EGFR IF NONAFRICN AM mL/min/1.73 83   EGFR IF AFRICN AM mL/min/1.73 101   CALCIUM mg/dL 9.1     HEPATIC:  Lab Results - Last 18 Months   Lab Units 03/24/21  0722   ALT (SGPT) U/L 13   AST (SGOT) U/L 18   ALK PHOS U/L 73     THYROID:  Lab Results - Last 18 Months   Lab Units 03/24/21  0722   TSH uIU/mL 0.825       Objective   /72   Pulse 89   Temp 98.4 °F (36.9 °C) (Infrared)   Resp 18   Ht 167.6 cm (66\")   Wt 82.1 kg (181 lb)   SpO2 95%   BMI 29.21 kg/m²   Body mass index is 29.21 " kg/m².    Physical Exam  Vitals and nursing note reviewed.   Constitutional:       Appearance: Normal appearance. He is normal weight.   HENT:      Head: Normocephalic.   Cardiovascular:      Rate and Rhythm: Normal rate and regular rhythm.   Pulmonary:      Effort: Pulmonary effort is normal.      Breath sounds: Normal breath sounds.   Musculoskeletal:         General: Tenderness (R knee lateral joint line) present. No swelling or deformity. Normal range of motion.      Right lower leg: No edema.      Left lower leg: No edema.   Skin:     Findings: No bruising or rash.   Neurological:      Mental Status: He is alert.       Assessment/Plan     1. Acute pain of right knee    2. Injury of right knee, initial encounter    3. Work related injury        Rx: reviewed/changes:  No orders of the defined types were placed in this encounter.    LAB/Testing/Referrals: reviewed/orders:   Today:   No orders of the defined types were placed in this encounter.    Chronic/recurrent labs above or change to:   same     Discussions:   Off work till sees [facility]/ortho  Body mass index is 29.21 kg/m².  Patient's Body mass index is 29.21 kg/m². indicating that he is within normal range (BMI 18.5-24.9). No BMI management plan needed..    Tobacco use reviewed:    Non-smoker  Selvin Garcia  reports that he has never smoked. He has never used smokeless tobacco.. I  There are no Patient Instructions on file for this visit.    Follow up: Return for lab;, Dr Pedersen-, as planned;.  Future Appointments   Date Time Provider Department Center   7/28/2021  8:20 AM LAB [facility]   8/3/2021  9:00 AM Aquilino Pedersen MD [facility]

## 2021-06-03 DIAGNOSIS — F98.8 ATTENTION DEFICIT DISORDER, UNSPECIFIED HYPERACTIVITY PRESENCE: ICD-10-CM

## 2021-06-03 RX ORDER — METHYLPHENIDATE HYDROCHLORIDE 27 MG/1
27 TABLET ORAL EVERY MORNING
Qty: 30 TABLET | Refills: 0 | Status: SHIPPED | OUTPATIENT
Start: 2021-06-03 | End: 2021-07-14 | Stop reason: SDUPTHER

## 2021-06-08 ENCOUNTER — OFFICE VISIT (OUTPATIENT)
Dept: FAMILY MEDICINE CLINIC | Facility: CLINIC | Age: 56
End: 2021-06-08

## 2021-06-08 VITALS
TEMPERATURE: 98.3 F | WEIGHT: 181 LBS | OXYGEN SATURATION: 98 % | BODY MASS INDEX: 29.09 KG/M2 | DIASTOLIC BLOOD PRESSURE: 80 MMHG | SYSTOLIC BLOOD PRESSURE: 122 MMHG | HEART RATE: 58 BPM | RESPIRATION RATE: 16 BRPM | HEIGHT: 66 IN

## 2021-06-08 DIAGNOSIS — H10.13 ALLERGIC CONJUNCTIVITIS OF BOTH EYES: ICD-10-CM

## 2021-06-08 DIAGNOSIS — S05.02XA ABRASION OF LEFT CORNEA, INITIAL ENCOUNTER: Primary | ICD-10-CM

## 2021-06-08 PROCEDURE — 99213 OFFICE O/P EST LOW 20 MIN: CPT | Performed by: NURSE PRACTITIONER

## 2021-06-08 RX ORDER — CIPROFLOXACIN HYDROCHLORIDE 3.5 MG/ML
1 SOLUTION/ DROPS TOPICAL 4 TIMES DAILY
Qty: 2.5 ML | Refills: 0 | Status: SHIPPED | OUTPATIENT
Start: 2021-06-08 | End: 2021-06-10

## 2021-06-08 RX ORDER — METHYLPREDNISOLONE 4 MG/1
TABLET ORAL
Qty: 1 EACH | Refills: 0 | Status: SHIPPED | OUTPATIENT
Start: 2021-06-08 | End: 2021-08-25

## 2021-06-08 NOTE — PROGRESS NOTES
Subjective   Chief Complaint:  Eye itching progressing to eye pain    History of Present Illness:  This 55 y.o. male was seen in the office today.  Reports he started out this week with eye itching, worse on the left.  Reports clear stringy drainage.  He is already on Singulair, Astelin eyedrops, Flonase.  He reports he has been rubbing the left eye more because of the itchiness and now having pain.    Allergies   Allergen Reactions   • Lexapro [Escitalopram Oxalate] Itching   • Ultram [Tramadol Hcl] Itching      Current Outpatient Medications on File Prior to Visit   Medication Sig   • celecoxib (CeleBREX) 200 MG capsule    • cetirizine (zyrTEC) 10 MG tablet TAKE 1 TABLET DAILY   • fluticasone (FLONASE) 50 MCG/ACT nasal spray 2 sprays into the nostril(s) as directed by provider Daily.   • meloxicam (MOBIC) 15 MG tablet    • methylphenidate (Concerta) 27 MG CR tablet Take 1 tablet by mouth Every Morning   • montelukast (SINGULAIR) 10 MG tablet Take 1 tablet by mouth Every Night. (Patient taking differently: Take 10 mg by mouth At Night As Needed.)   • PROAIR  (90 Base) MCG/ACT inhaler USE 2 INHALATIONS EVERY 4 HOURS AS NEEDED FOR WHEEZING   • sucralfate (Carafate) 1 g tablet Take 1 tablet by mouth 4 (Four) Times a Day.     No current facility-administered medications on file prior to visit.      Past Medical, Surgical, Social, and Family History:  Past Medical History:   Diagnosis Date   • Abnormal CT of paranasal sinuses 08/20/2015    (Note 1 of 3)  NASAL SEPTUM:  The nasal septum is relatively midline.  MAXILLARY SINUSES:  The left maxillary sinus showed >5 m mucosal thickening and 50% opacification and the right maxillary sinus showed >5 mm mucosal thickening. The osteomeatal complex is obstructed bilaterally.   • Abnormal CT of paranasal sinuses 08/20/2015    (Note 2 of 3)  NASAL TURBINATES: The inferior turbinates showed bilateral hypertrophy. The Cherie bullosa are not present.  ETHMOID SINUSES: The  left ethmoid sinus showed >3 mm mucosal thickening and complete opacification. The lamina papyracea appears intact bilaterally.   • Abnormal CT of paranasal sinuses 08/20/2015    (Note 3 of 3)  FRONTAL SINUSES:  The left frontal sinus showed complete opacification. The right frontal sinus showed complete opacification. SPENOID SINUSES: The left sphenoid sinus showed moderate mucosal thickening. The right sphenoid sinus showed moderate mucosal thickening.   • Allergic rhinitis 12/01/2015   • Chronic rhinitis    • Chronic sinusitis    • Disturbances of sensation of smell and taste 12/01/2015   • Sensorineural hearing loss, bilateral 12/01/2015   • Sleep apnea    • Subjective tinnitus 12/01/2015     Past Surgical History:   Procedure Laterality Date   • FUNCTIONAL ENDOSCOPIC SINUS SURGERY  07/26/2016   • OTHER SURGICAL HISTORY      Open Reduction-Internal Fixation   • SEPTOPLASTY      Per Dr. Urrutia 7-8 years ago     Social History     Socioeconomic History   • Marital status:      Spouse name: Kelsy ()   • Number of children: 2   • Years of education: Not on file   • Highest education level: Not on file   Tobacco Use   • Smoking status: Never Smoker   • Smokeless tobacco: Never Used   Substance and Sexual Activity   • Alcohol use: Yes     Comment: Current some day - 1 week   • Drug use: Defer   • Sexual activity: Defer     Family History   Problem Relation Age of Onset   • Diabetes Other    • No Known Problems Mother    • No Known Problems Father      Objective   Physical Exam  Constitutional:       General: He is not in acute distress.  Eyes:      General: Lids are normal. Lids are everted, no foreign bodies appreciated.         Right eye: No foreign body or discharge.         Left eye: Discharge ( Clear) present.     Comments: Fluorescein exam confirms corneal abrasion at about the 4 o'clock position.  Left eye.   Cardiovascular:      Rate and Rhythm: Normal rate and regular rhythm.   Pulmonary:  "     Effort: Pulmonary effort is normal.      Breath sounds: Normal breath sounds.   Neurological:      Mental Status: He is alert.     /80 (BP Location: Left arm)   Pulse 58   Temp 98.3 °F (36.8 °C)   Resp 16   Ht 167.6 cm (66\")   Wt 82.1 kg (181 lb)   SpO2 98%   BMI 29.21 kg/m²     Assessment/Plan   Diagnoses and all orders for this visit:    1. Abrasion of left cornea, initial encounter (Primary)  -     ciprofloxacin (Ciloxan) 0.3 % ophthalmic solution; Administer 1 drop into the left eye 4 (Four) Times a Day for 2 days.  Dispense: 2.5 mL; Refill: 0    2. Allergic conjunctivitis of both eyes  -     methylPREDNISolone (MEDROL) 4 MG dose pack; Take as directed on package instructions.  Dispense: 1 each; Refill: 0    Discussion:  Advised and educated plan of care.  This seems to have started with an allergic conjunctivitis that with touching his eye progressed to a corneal abrasion.  Floxin eyedrops prescribed as prophylaxis, I advised this will just take time to heal.  Considered ointment-his insurance does not cover the ointment version.  Advised if any vision changes to follow-up with ophthalmology or go to ER.    Follow-up:  Return if symptoms worsen or fail to improve.    Electronically signed by RIRI Hall, 06/08/21, 1:17 PM CDT.  "

## 2021-06-11 RX ORDER — KETOROLAC TROMETHAMINE 5 MG/ML
1 SOLUTION OPHTHALMIC 4 TIMES DAILY
Qty: 3 ML | Refills: 0 | Status: SHIPPED | OUTPATIENT
Start: 2021-06-11 | End: 2022-03-02

## 2021-07-13 ENCOUNTER — PATIENT MESSAGE (OUTPATIENT)
Dept: FAMILY MEDICINE CLINIC | Facility: CLINIC | Age: 56
End: 2021-07-13

## 2021-07-13 DIAGNOSIS — F98.8 ATTENTION DEFICIT DISORDER, UNSPECIFIED HYPERACTIVITY PRESENCE: ICD-10-CM

## 2021-07-14 RX ORDER — METHYLPHENIDATE HYDROCHLORIDE 27 MG/1
27 TABLET ORAL EVERY MORNING
Qty: 30 TABLET | Refills: 0 | Status: SHIPPED | OUTPATIENT
Start: 2021-07-14 | End: 2021-09-03 | Stop reason: SDUPTHER

## 2021-08-09 RX ORDER — CELECOXIB 200 MG/1
CAPSULE ORAL
Qty: 90 CAPSULE | Refills: 3 | Status: ON HOLD | OUTPATIENT
Start: 2021-08-09 | End: 2021-10-15

## 2021-08-10 ENCOUNTER — OFFICE VISIT (OUTPATIENT)
Dept: FAMILY MEDICINE CLINIC | Facility: CLINIC | Age: 56
End: 2021-08-10

## 2021-08-10 VITALS
WEIGHT: 175 LBS | SYSTOLIC BLOOD PRESSURE: 130 MMHG | HEART RATE: 66 BPM | DIASTOLIC BLOOD PRESSURE: 80 MMHG | HEIGHT: 66 IN | TEMPERATURE: 97.8 F | BODY MASS INDEX: 28.12 KG/M2 | RESPIRATION RATE: 16 BRPM | OXYGEN SATURATION: 95 %

## 2021-08-10 DIAGNOSIS — Z02.89 ENCOUNTER FOR EXAMINATION REQUIRED BY DEPARTMENT OF TRANSPORTATION (DOT): Primary | ICD-10-CM

## 2021-08-10 PROBLEM — G47.10 HYPERSOMNIA: Status: RESOLVED | Noted: 2020-10-19 | Resolved: 2021-08-10

## 2021-08-10 LAB
BILIRUB BLD-MCNC: NEGATIVE MG/DL
CLARITY, POC: CLEAR
COLOR UR: ABNORMAL
GLUCOSE UR STRIP-MCNC: NEGATIVE MG/DL
KETONES UR QL: NEGATIVE
LEUKOCYTE EST, POC: NEGATIVE
NITRITE UR-MCNC: NEGATIVE MG/ML
PH UR: 6 [PH] (ref 5–8)
PROT UR STRIP-MCNC: ABNORMAL MG/DL
RBC # UR STRIP: NEGATIVE /UL
SP GR UR: 1.02 (ref 1–1.03)
UROBILINOGEN UR QL: NORMAL

## 2021-08-10 PROCEDURE — DOTPHY: Performed by: FAMILY MEDICINE

## 2021-08-10 PROCEDURE — 81003 URINALYSIS AUTO W/O SCOPE: CPT | Performed by: FAMILY MEDICINE

## 2021-08-10 NOTE — PROGRESS NOTES
Subjective   Selvin Garcia is a 55 y.o. male presenting with chief complaint of:   Chief Complaint   Patient presents with   • Employment Physical     DOT physical        History of Present Illness :  Alone.  Here for primarily an acute issue today; needs DOT exam. Has few chronic problems to consider that might have a bearing on today's issues; not an interval appointment.       Chronic/acute problems reviewed today:   1. Encounter for examination required by Department of Transportation (DOT)      Has an/another acute issue today: none.    The following portions of the patient's history were reviewed and updated as appropriate: allergies, current medications, past family history, past medical history, past social history, past surgical history and problem list.      Current Outpatient Medications:   •  celecoxib (CeleBREX) 200 MG capsule, TAKE 1 CAPSULE DAILY, Disp: 90 capsule, Rfl: 3  •  cetirizine (zyrTEC) 10 MG tablet, TAKE 1 TABLET DAILY, Disp: 90 tablet, Rfl: 3  •  fluticasone (FLONASE) 50 MCG/ACT nasal spray, 2 sprays into the nostril(s) as directed by provider Daily., Disp: 16 g, Rfl: 11  •  methylphenidate (Concerta) 27 MG CR tablet, Take 1 tablet by mouth Every Morning, Disp: 30 tablet, Rfl: 0  •  ketorolac (ACULAR) 0.5 % ophthalmic solution, Apply 1 drop to eye(s) as directed by provider 4 (Four) Times a Day., Disp: 3 mL, Rfl: 0  •  meloxicam (MOBIC) 15 MG tablet, , Disp: , Rfl:   •  montelukast (SINGULAIR) 10 MG tablet, Take 1 tablet by mouth Every Night. (Patient taking differently: Take 10 mg by mouth At Night As Needed.), Disp: 90 tablet, Rfl: 3  •  PROAIR  (90 Base) MCG/ACT inhaler, USE 2 INHALATIONS EVERY 4 HOURS AS NEEDED FOR WHEEZING, Disp: 25.5 g, Rfl: 0  •  sucralfate (Carafate) 1 g tablet, Take 1 tablet by mouth 4 (Four) Times a Day., Disp: 120 tablet, Rfl: 0    No problems with medications.  Refills if needed done    Allergies   Allergen Reactions   • Lexapro [Escitalopram Oxalate]  Itching   • Ultram [Tramadol Hcl] Itching       See DOT form also  Review of Systems  GENERAL:  Less active/slower with limits, speed, stamina for age.   Sleep is ok.   No fever now/recent (no night sweats).  SKIN: No rash/skin lesion of concern unless listed above/below.   ENDO:  No syncope, near or diaphoretic sweaty spells.  HEENT: No recent head injury; diffuse headache.   No vision change.  No hearing loss.  Ears without pain/drainage.  No sore throat.  No significant nasal/sinus congestion/drainage. No epistaxis.  CHEST: No chest wall tenderness or mass.  No cough, with wheeze.  No SOB; no hemoptysis.  CV: No chest pain, palpitations, ankle edema.  GI: No heartburn, dysphagia.  No abdominal pain, diarrhea, constipation.  No rectal bleeding, or melena.    :  Voids without dysuria, or incontinence to completion.  ORTHO: No painful/swollen joints but various on /off sore.  No sore neck or back.  No acute neck or back pain without recent injury.  NEURO: No dizziness, weakness of extremities.  No numbness/paresthesias.   PSYCH: No memory loss.  Mood down; on/off anxious, depressed but/and not suicidal.  Tries to tolerate stress.   Screening:  Mammogram: NA  Bone density: NA  Low dose CT chest:Tobacco-smoker/never NA  GI:   Colonoscopy+polyp/LH/Bod/12.9.13/5y-reminded  EGD+biop-gastric ulcer/[place]/LH/8.14.15/8w  EGD+biop/LH/[place]/10.16.15  Prostate: 8.10.21  7.12.19  Usual lab order  12m CBC, CMP, LIPID, TSH, PSAs, Vit D    Lab Results:  Results for orders placed or performed in visit on 08/10/21   POC Urinalysis Dipstick, Multipro    Specimen: Urine   Result Value Ref Range    Color Dark Yellow Yellow, Straw, Dark Yellow, Lcaribel    Clarity, UA Clear Clear    Glucose, UA Negative Negative, 1000 mg/dL (3+) mg/dL    Bilirubin Negative Negative    Ketones, UA Negative Negative    Specific Gravity  1.020 1.005 - 1.030    Blood, UA Negative Negative    pH, Urine 6.0 5.0 - 8.0    Protein, POC 1+ (A) Negative mg/dL     "Urobilinogen, UA Normal Normal    Nitrite, UA Negative Negative    Leukocytes Negative Negative       A1C:  Lab Results - Last 18 Months   Lab Units 03/24/21  0722   HEMOGLOBIN A1C % 4.70*     PSA:  Lab Results - Last 18 Months   Lab Units 03/24/21  0722   PSA ng/mL 0.761     CBC:  Lab Results - Last 18 Months   Lab Units 03/24/21  0722   WBC 10*3/mm3 7.21   HEMOGLOBIN g/dL 15.8   HEMATOCRIT % 46.1   PLATELETS 10*3/mm3 362      BMP/CMP:  Lab Results - Last 18 Months   Lab Units 03/24/21  0722   SODIUM mmol/L 140   POTASSIUM mmol/L 4.6   CHLORIDE mmol/L 106   TOTAL CO2 mmol/L 26.4   GLUCOSE mg/dL 110*   BUN mg/dL 20   CREATININE mg/dL 0.94   EGFR IF NONAFRICN AM mL/min/1.73 83   EGFR IF AFRICN AM mL/min/1.73 101   CALCIUM mg/dL 9.1     HEPATIC:  Lab Results - Last 18 Months   Lab Units 03/24/21  0722   ALT (SGPT) U/L 13   AST (SGOT) U/L 18   ALK PHOS U/L 73     THYROID:  Lab Results - Last 18 Months   Lab Units 03/24/21  0722   TSH uIU/mL 0.825       Objective   /80 (BP Location: Left arm, Patient Position: Sitting)   Pulse 66   Temp 97.8 °F (36.6 °C)   Resp 16   Ht 167.6 cm (66\")   Wt 79.4 kg (175 lb)   SpO2 95%   BMI 28.25 kg/m²   Body mass index is 28.25 kg/m².    See DOT form also  Physical Exam  GENERAL:  Well nourished/developed in no acute distress.   SKIN: Turgor excellent, without wound, rash, lesion.  HEENT: Normal cephalic without trauma.  Pupils equal round reactive to light. Extraocular motions full without nystagmus. Fields 85 deg/confrontation.   No thyromegaly, mass, tenderness, lymphadenopathy and supple.  CV: Regular rhythm.  No murmur, gallop,  edema.  CHEST: No chest wall tenderness or mass.  No axillary tenderness/mass.   LUNGS: Symmetric motion with clear to auscultation.   ABD: Soft, nontender without mass.   ORTHO: Symmetric extremities without swelling/point tenderness.  Full gross range of motion.  NEURO: CN 2-12 grossly intact.  Symmetric facies and UE/LE. 3-4/5 strength " throughout.  Nonfocal use extremities. Speech clear.  Finger to nose intact.   PSYCH: Oriented x 3.  Pleasant calm, well kept.  Purposeful/directed conservation with intact short/long gross memory.      Assessment/Plan     1. Encounter for examination required by Department of Transportation (DOT)    acceptable exam for 1 yr    Rx: reviewed/changes:  No orders of the defined types were placed in this encounter.      LAB/Testing/Referrals: reviewed/orders:   Today: UA for DOT  Orders Placed This Encounter   Procedures   • POC Urinalysis Dipstick, Multipro       Discussions:   Continue chronic care with us  Body mass index is 28.25 kg/m².   Patient's Body mass index is 28.25 kg/m². indicating that he is overweight (BMI 25-29.9). Obesity-related health conditions include the following: none. Obesity is unchanged. BMI is is above average; BMI management plan is completed. We discussed portion control and increasing exercise..  Non-smoker  Selvin Garcia  reports that he has never smoked. He has never used smokeless tobacco..     There are no Patient Instructions on file for this visit.    Follow up: Return for lab/Dr Pedersen as planned.  Future Appointments   Date Time Provider Department Center   8/11/2021  8:30 AM LAB [facility]   8/25/2021  3:30 PM Aquilino Pedersen MD [facility]

## 2021-08-11 ENCOUNTER — LAB (OUTPATIENT)
Dept: FAMILY MEDICINE CLINIC | Facility: CLINIC | Age: 56
End: 2021-08-11

## 2021-08-25 ENCOUNTER — OFFICE VISIT (OUTPATIENT)
Dept: FAMILY MEDICINE CLINIC | Facility: CLINIC | Age: 56
End: 2021-08-25

## 2021-08-25 VITALS
DIASTOLIC BLOOD PRESSURE: 72 MMHG | BODY MASS INDEX: 29.38 KG/M2 | TEMPERATURE: 98 F | SYSTOLIC BLOOD PRESSURE: 108 MMHG | HEART RATE: 76 BPM | OXYGEN SATURATION: 98 % | HEIGHT: 66 IN | WEIGHT: 182.8 LBS | RESPIRATION RATE: 18 BRPM

## 2021-08-25 DIAGNOSIS — N64.4 BREAST PAIN: ICD-10-CM

## 2021-08-25 DIAGNOSIS — F32.A DEPRESSION, UNSPECIFIED DEPRESSION TYPE: Chronic | ICD-10-CM

## 2021-08-25 DIAGNOSIS — R73.01 ELEVATED FASTING GLUCOSE: ICD-10-CM

## 2021-08-25 DIAGNOSIS — N40.0 PROSTATISM: Chronic | ICD-10-CM

## 2021-08-25 DIAGNOSIS — M19.90 OSTEOARTHRITIS, UNSPECIFIED OSTEOARTHRITIS TYPE, UNSPECIFIED SITE: Chronic | ICD-10-CM

## 2021-08-25 DIAGNOSIS — Z12.11 ENCOUNTER FOR SCREENING FOR MALIGNANT NEOPLASM OF COLON: ICD-10-CM

## 2021-08-25 DIAGNOSIS — R41.840 POOR CONCENTRATION: ICD-10-CM

## 2021-08-25 DIAGNOSIS — F41.9 ANXIETY: Chronic | ICD-10-CM

## 2021-08-25 PROCEDURE — 99213 OFFICE O/P EST LOW 20 MIN: CPT | Performed by: FAMILY MEDICINE

## 2021-08-25 NOTE — PROGRESS NOTES
"Subjective   Selvin Garcia is a 55 y.o. male presenting with chief complaint of:   Chief Complaint   Patient presents with   • Follow-up     \"labs and medication\"       History of Present Illness :  Alone.       Has multiple chronic problems to consider that might have a bearing on today's issues;  an interval appointment.       Chronic/acute problems reviewed today:   1. Elevated fasting glucose Chronic/stable. No problem/pattern hypoglycemia/hyperglycemia manifest by poly- dypsia, phagia, uria, or sweats, diaphoretic episodes, syncope/near.     2. Prostatism Chronic/stable.  Slower but tolerated stream with complete emptying.  Nocturia on occ; tolerated.  No desire to persure evaluations/surgery.      3. Depression, unspecified depression type chronic past significant  mood swings, down moods, nervousness, difficulty with concentration to function home/work.  Others close have not been concerned.  No suicide ideation/intent.  Rx helped past; declined now      4. Anxiety Chronic/stable now.   On/off anxiety tolerated well.  Rx helped past and not interested in Rx change. Stress ongoing day to day.      5. Poor concentration chronic improved concentration with tolerated Adderall without palpitations syncope near syncope weight loss   6. Osteoarthritis, unspecified osteoarthritis type, unspecified site Chronic/stable.  Various on/off joint pains/soreness/stiffness.  Particular joint problems with various.  No joint swelling.  Treats mainly with reduced activity, Rx listed, Tylenol. Active/occ NSAIDs, and no injections.      7. Breast pain occasional soreness both breasts   8. Encounter for screening for malignant neoplasm of colon chronic risk for colon cancer is now over 50 and has not yet had colonoscopy.  Denies melena hematochezia     Has an/another acute issue today: deshawnn.    The following portions of the patient's history were reviewed and updated as appropriate: allergies, current medications, past family " history, past medical history, past social history, past surgical history and problem list.      Current Outpatient Medications:   •  celecoxib (CeleBREX) 200 MG capsule, TAKE 1 CAPSULE DAILY, Disp: 90 capsule, Rfl: 3  •  cetirizine (zyrTEC) 10 MG tablet, TAKE 1 TABLET DAILY, Disp: 90 tablet, Rfl: 3  •  fluticasone (FLONASE) 50 MCG/ACT nasal spray, 2 sprays into the nostril(s) as directed by provider Daily., Disp: 16 g, Rfl: 11  •  methylphenidate (Concerta) 27 MG CR tablet, Take 1 tablet by mouth Every Morning, Disp: 30 tablet, Rfl: 0  •  PROAIR  (90 Base) MCG/ACT inhaler, USE 2 INHALATIONS EVERY 4 HOURS AS NEEDED FOR WHEEZING, Disp: 25.5 g, Rfl: 0  •  ketorolac (ACULAR) 0.5 % ophthalmic solution, Apply 1 drop to eye(s) as directed by provider 4 (Four) Times a Day., Disp: 3 mL, Rfl: 0  •  meloxicam (MOBIC) 15 MG tablet, , Disp: , Rfl:   •  montelukast (SINGULAIR) 10 MG tablet, Take 1 tablet by mouth Every Night. (Patient taking differently: Take 10 mg by mouth At Night As Needed.), Disp: 90 tablet, Rfl: 3  •  sucralfate (Carafate) 1 g tablet, Take 1 tablet by mouth 4 (Four) Times a Day., Disp: 120 tablet, Rfl: 0    No problems with medications.    Allergies   Allergen Reactions   • Lexapro [Escitalopram Oxalate] Itching   • Ultram [Tramadol Hcl] Itching       Review of Systems  GENERAL:  Less active/slower with limits, speed, stamina for age.   Sleep is ok.   No fever now/recent (no night sweats).  SKIN: No rash/skin lesion of concern unless listed above/below.   ENDO:  No syncope, near or diaphoretic sweaty spells.  HEENT: No recent head injury; diffuse headache.   No vision change.  No hearing loss.  Ears without pain/drainage.  No sore throat.  No significant nasal/sinus congestion/drainage. No epistaxis.  CHEST: No chest wall tenderness or mass.  No cough, with wheeze.  No SOB; no hemoptysis.  CV: No chest pain, palpitations, ankle edema.  GI: No heartburn, dysphagia.  No abdominal pain, diarrhea,  constipation.  No rectal bleeding, or melena.    :  Voids without dysuria, or incontinence to completion.  ORTHO: No painful/swollen joints but various on /off sore.  No sore neck or back.  No acute neck or back pain without recent injury.  NEURO: No dizziness, weakness of extremities.  No numbness/paresthesias.   PSYCH: No memory loss.  Mood down; on/off anxious, depressed but/and not suicidal.  Tries to tolerate stress.   Screening:  Mammogram: NA  Bone density: NA  Low dose CT chest:Tobacco-smoker/never NA  GI:   Colonoscopy+polyp/[facility]/Bod/12.9.13/5y-reminded  EGD+biop-gastric ulcer/[place]/[facility]/8.14.15/8w  EGD+biop/[facility]/[place]/10.16.15  Prostate: 8.10.21  7.12.19  Usual lab order  12m CBC, CMP, LIPID, TSH, PSAs, Vit D    Data reviewed:     Lab Results:  Results for orders placed or performed in visit on 08/10/21   POC Urinalysis Dipstick, Multipro    Specimen: Urine   Result Value Ref Range    Color Dark Yellow Yellow, Straw, Dark Yellow, Claribel    Clarity, UA Clear Clear    Glucose, UA Negative Negative, 1000 mg/dL (3+) mg/dL    Bilirubin Negative Negative    Ketones, UA Negative Negative    Specific Gravity  1.020 1.005 - 1.030    Blood, UA Negative Negative    pH, Urine 6.0 5.0 - 8.0    Protein, POC 1+ (A) Negative mg/dL    Urobilinogen, UA Normal Normal    Nitrite, UA Negative Negative    Leukocytes Negative Negative       A1C:  Lab Results - Last 18 Months   Lab Units 08/11/21  0726 03/24/21  0722   HEMOGLOBIN A1C % 4.50* 4.70*     LIPID:  Lab Results - Last 18 Months   Lab Units 03/24/21  0722   CHOLESTEROL mg/dL 194   LDL CHOL mg/dL 129*   HDL CHOL mg/dL 56   TRIGLYCERIDES mg/dL 47     PSA:  Lab Results - Last 18 Months   Lab Units 03/24/21  0722   PSA ng/mL 0.761     CBC:  Lab Results - Last 18 Months   Lab Units 08/11/21  0726 03/24/21  0722   WBC 10*3/mm3 5.49 7.21   HEMOGLOBIN g/dL 16.2 15.8   HEMATOCRIT % 49.2 46.1   PLATELETS 10*3/mm3 308 362      BMP/CMP:  Lab Results - Last 18 Months   Lab Units  "08/11/21  0726 03/24/21  0722   SODIUM mmol/L 140 140   POTASSIUM mmol/L 4.8 4.6   CHLORIDE mmol/L 105 106   TOTAL CO2 mmol/L 25.9 26.4   GLUCOSE mg/dL 90 110*   BUN mg/dL 15 20   CREATININE mg/dL 0.98 0.94   EGFR IF NONAFRICN AM mL/min/1.73 79 83   EGFR IF AFRICN AM mL/min/1.73 96 101   CALCIUM mg/dL 9.5 9.1     HEPATIC:  Lab Results - Last 18 Months   Lab Units 08/11/21  0726 03/24/21  0722   ALT (SGPT) U/L 12 13   AST (SGOT) U/L 19 18   ALK PHOS U/L 79 73     THYROID:  Lab Results - Last 18 Months   Lab Units 03/24/21  0722   TSH uIU/mL 0.825       Objective   /72 (BP Location: Left arm, Patient Position: Sitting, Cuff Size: Adult)   Pulse 76   Temp 98 °F (36.7 °C) (Infrared)   Resp 18   Ht 167.6 cm (66\")   Wt 82.9 kg (182 lb 12.8 oz)   SpO2 98%   BMI 29.50 kg/m²   Body mass index is 29.5 kg/m².    Recent Vitals       6/8/2021 8/10/2021 8/25/2021       BP:  122/80  130/80  108/72     Pulse:  58  66  76     Temp:  98.3 °F (36.8 °C)  97.8 °F (36.6 °C)  98 °F (36.7 °C)     Weight:  82.1 kg (181 lb)  79.4 kg (175 lb)  82.9 kg (182 lb 12.8 oz)     BMI (Calculated):  29.2  28.3  29.5           Physical Exam  Physical Exam  GENERAL:  Well nourished/developed in no acute distress.   SKIN: Turgor excellent, without wound, rash, lesion.  HEENT: Normal cephalic without trauma.  Pupils equal round reactive to light. Extraocular motions full without nystagmus. Fields 85 deg/confrontation.   No thyromegaly, mass, tenderness, lymphadenopathy and supple.  CV: Regular rhythm.  No murmur, gallop,  edema.  CHEST: No chest wall tenderness or mass.  No axillary tenderness/mass.   LUNGS: Symmetric motion with clear to auscultation.   ABD: Soft, nontender without mass.   ORTHO: Symmetric extremities without swelling/point tenderness.  Full gross range of motion.  NEURO: CN 2-12 grossly intact.  Symmetric facies and UE/LE. 3-4/5 strength throughout.  Nonfocal use extremities. Speech clear.  Finger to nose intact.   PSYCH: " Oriented x 3.  Pleasant calm, well kept.  Purposeful/directed conservation with intact short/long gross memory    Assessment/Plan     1. Elevated fasting glucose    2. Prostatism    3. Depression, unspecified depression type    4. Anxiety    5. Poor concentration    6. Osteoarthritis, unspecified osteoarthritis type, unspecified site    7. Breast pain    8. Encounter for screening for malignant neoplasm of colon        Discussion:  Return 6m; between DOT  If BS stays ok; make DOT 2y  Same Rx    Medical decision issues:   Data review above:   Rx: reviewed and decisions:   Same Rx for now    Orders placed:   LAB/Testing/Referrals: reviewed/orders:   Today:   Orders Placed This Encounter   Procedures   • Ambulatory Referral to Gastroenterology     Chronic/recurrent labs above or change to:   same     Health maintenance:   Body mass index is 29.5 kg/m².  Patient's Body mass index is 29.5 kg/m². indicating that he is overweight (BMI 25-29.9). Obesity-related health conditions include the following: osteoarthritis. Obesity is unchanged. BMI is is above average; BMI management plan is completed. We discussed portion control and increasing exercise..     Tobacco use reviewed:    Selvin Garcia  reports that he has never smoked. He has never used smokeless tobacco..     There are no Patient Instructions on file for this visit.    Follow up: Return for Dr Pedersen 6m.  No future appointments.

## 2021-09-02 ENCOUNTER — PATIENT MESSAGE (OUTPATIENT)
Dept: FAMILY MEDICINE CLINIC | Facility: CLINIC | Age: 56
End: 2021-09-02

## 2021-09-02 DIAGNOSIS — F98.8 ATTENTION DEFICIT DISORDER, UNSPECIFIED HYPERACTIVITY PRESENCE: ICD-10-CM

## 2021-09-03 RX ORDER — METHYLPHENIDATE HYDROCHLORIDE 27 MG/1
27 TABLET ORAL EVERY MORNING
Qty: 30 TABLET | Refills: 0 | Status: SHIPPED | OUTPATIENT
Start: 2021-09-03 | End: 2021-10-14 | Stop reason: SDUPTHER

## 2021-09-20 ENCOUNTER — OFFICE VISIT (OUTPATIENT)
Dept: GASTROENTEROLOGY | Facility: CLINIC | Age: 56
End: 2021-09-20

## 2021-09-20 VITALS
WEIGHT: 180 LBS | SYSTOLIC BLOOD PRESSURE: 122 MMHG | DIASTOLIC BLOOD PRESSURE: 76 MMHG | TEMPERATURE: 97.1 F | OXYGEN SATURATION: 98 % | HEART RATE: 65 BPM | HEIGHT: 68 IN | BODY MASS INDEX: 27.28 KG/M2

## 2021-09-20 DIAGNOSIS — Z86.010 HX OF COLONIC POLYPS: Primary | ICD-10-CM

## 2021-09-20 PROBLEM — Z86.0100 HX OF COLONIC POLYPS: Status: ACTIVE | Noted: 2021-09-20

## 2021-09-20 PROCEDURE — S0285 CNSLT BEFORE SCREEN COLONOSC: HCPCS | Performed by: NURSE PRACTITIONER

## 2021-09-20 NOTE — H&P (VIEW-ONLY)
Chief Complaint   Patient presents with   • Colon Cancer Screening     Pt presents today for evaluation for colonoscopy-pt's last colon was 12/9/2013 by Dr. Chavez-pt's PCP states he had polyps but pt doesn't remember      Subjective   HPI  Selvin Garcia is a 55 y.o. male who presents as a referral for preventative maintenance. He has no complaints of nausea or vomiting. No change in bowels. No wt loss. No BRBPR. No melena. There is no family hx for colon cancer. No abdominal pain. There was no Cologuard screening this year.  The patient's last colonoscopy was performed per Dr. Chavez with a polyp on 12/9/2013 abstracted from PCPs note.    Past Medical History:   Diagnosis Date   • Abnormal CT of paranasal sinuses 08/20/2015    (Note 1 of 3)  NASAL SEPTUM:  The nasal septum is relatively midline.  MAXILLARY SINUSES:  The left maxillary sinus showed >5 m mucosal thickening and 50% opacification and the right maxillary sinus showed >5 mm mucosal thickening. The osteomeatal complex is obstructed bilaterally.   • Abnormal CT of paranasal sinuses 08/20/2015    (Note 2 of 3)  NASAL TURBINATES: The inferior turbinates showed bilateral hypertrophy. The Cherie bullosa are not present.  ETHMOID SINUSES: The left ethmoid sinus showed >3 mm mucosal thickening and complete opacification. The lamina papyracea appears intact bilaterally.   • Abnormal CT of paranasal sinuses 08/20/2015    (Note 3 of 3)  FRONTAL SINUSES:  The left frontal sinus showed complete opacification. The right frontal sinus showed complete opacification. SPENOID SINUSES: The left sphenoid sinus showed moderate mucosal thickening. The right sphenoid sinus showed moderate mucosal thickening.   • Allergic rhinitis 12/01/2015   • Chronic rhinitis    • Chronic sinusitis    • Disturbances of sensation of smell and taste 12/01/2015   • History of colon polyps    • Sensorineural hearing loss, bilateral 12/01/2015   • Sleep apnea    • Subjective tinnitus  12/01/2015     Past Surgical History:   Procedure Laterality Date   • COLONOSCOPY  12/09/2013    Dr. Chavez-Polyp; Repeat 5 years (Abstracted from PCP's note)   • ENDOSCOPY  08/14/2015    Dr. Mullins-Gastric ulcer; Repeat 8 weeks (Abstracted from PCP's note)   • ENDOSCOPY  10/16/2015    Dr. Mullins-Dr. Mullins (Abstracted from PCP's note)   • FUNCTIONAL ENDOSCOPIC SINUS SURGERY  07/26/2016   • HEMORRHOIDECTOMY     • KNEE ARTHROPLASTY, PARTIAL REPLACEMENT Left    • KNEE MENISCECTOMY Right     X 2   • OTHER SURGICAL HISTORY      Open Reduction-Internal Fixation   • ROTATOR CUFF REPAIR Right    • SEPTOPLASTY      Per Dr. Urrutia 7-8 years ago       Current Outpatient Medications:   •  celecoxib (CeleBREX) 200 MG capsule, TAKE 1 CAPSULE DAILY, Disp: 90 capsule, Rfl: 3  •  cetirizine (zyrTEC) 10 MG tablet, TAKE 1 TABLET DAILY, Disp: 90 tablet, Rfl: 3  •  fluticasone (FLONASE) 50 MCG/ACT nasal spray, 2 sprays into the nostril(s) as directed by provider Daily., Disp: 16 g, Rfl: 11  •  ketorolac (ACULAR) 0.5 % ophthalmic solution, Apply 1 drop to eye(s) as directed by provider 4 (Four) Times a Day., Disp: 3 mL, Rfl: 0  •  meloxicam (MOBIC) 15 MG tablet, Take 15 mg by mouth Daily., Disp: , Rfl:   •  methylphenidate (Concerta) 27 MG CR tablet, Take 1 tablet by mouth Every Morning, Disp: 30 tablet, Rfl: 0  •  montelukast (SINGULAIR) 10 MG tablet, Take 1 tablet by mouth Every Night. (Patient taking differently: Take 10 mg by mouth At Night As Needed.), Disp: 90 tablet, Rfl: 3  •  PROAIR  (90 Base) MCG/ACT inhaler, USE 2 INHALATIONS EVERY 4 HOURS AS NEEDED FOR WHEEZING, Disp: 25.5 g, Rfl: 0  •  Sodium Sulfate-Mag Sulfate-KCl 6420-385-660 MG tablet, Take 12 tablets by mouth Take As Directed., Disp: 24 tablet, Rfl: 0  Allergies   Allergen Reactions   • Lexapro [Escitalopram Oxalate] Itching   • Ultram [Tramadol Hcl] Itching     Social History     Socioeconomic History   • Marital status:      Spouse name: Kelsy  "()   • Number of children: 2   • Years of education: Not on file   • Highest education level: Not on file   Tobacco Use   • Smoking status: Never Smoker   • Smokeless tobacco: Never Used   Vaping Use   • Vaping Use: Never used   Substance and Sexual Activity   • Alcohol use: Yes     Comment: Current some day - 1 week   • Drug use: Defer   • Sexual activity: Defer     Family History   Problem Relation Age of Onset   • Diabetes Other    • No Known Problems Mother    • No Known Problems Father    • Colon polyps Neg Hx    • Colon cancer Neg Hx    • Esophageal cancer Neg Hx    • Liver cancer Neg Hx    • Liver disease Neg Hx    • Rectal cancer Neg Hx    • Stomach cancer Neg Hx        REVIEW OF SYSTEMS  General: well appearing, no fever chills or sweats, no unexplained wt loss  HEENT: no acute visual or hearing disturbances  Cardiovascular: No chest pain or palpitations  Pulmonary: No shortness of breath, coughing, wheezing or hemoptysis  : No burning, urgency, hematuria, or dysuria  Musculoskeletal: No joint pain or stiffness  Peripheral: no edema  Skin: No lesions or rashes  Neuro: No dizziness, headaches, stroke, syncope  Endocrine: No hot or cold intolerances  Hematological: No blood dyscrasias    Objective   Vitals:    09/20/21 1415   BP: 122/76   BP Location: Left arm   Patient Position: Sitting   Cuff Size: Adult   Pulse: 65   Temp: 97.1 °F (36.2 °C)   TempSrc: Infrared   SpO2: 98%   Weight: 81.6 kg (180 lb)   Height: 172.7 cm (68\")         PHYSICAL EXAM  General: age appropriate well nourished well appearing, no acute distress  Head: normocephalic and atraumatic  Global assessment-supple  Neck-No JVD noted, no lymphadenopathy  Pulmonary-clear to auscultation bilaterally, normal respiratory effort  Cardiovascular-normal rate and rhythm, normal heart sounds, S1 and S2 noted  Abdomen-soft, non tender, non distended, normal bowel sounds all 4 quadrants, no hepatosplenomegaly noted  Extremities-No clubbing " cyanosis or edema  Neuro-Non focal, converses appropriately, awake, alert, oriented    Lab Results - Last 18 Months   Lab Units 08/11/21  0726 03/24/21  0722   BUN mg/dL 15 20   CREATININE mg/dL 0.98 0.94   SODIUM mmol/L 140 140   POTASSIUM mmol/L 4.8 4.6   CHLORIDE mmol/L 105 106   TOTAL CO2 mmol/L 25.9 26.4   ALBUMIN g/dL 4.20 4.40   ALT (SGPT) U/L 12 13   AST (SGOT) U/L 19 18   ALK PHOS U/L 79 73   BILIRUBIN mg/dL 0.7 0.4       Lab Results - Last 18 Months   Lab Units 08/11/21  0726 03/24/21  0722   HEMOGLOBIN g/dL 16.2 15.8   HEMATOCRIT % 49.2 46.1   MCV fL 92.0 86.7   WBC 10*3/mm3 5.49 7.21   RDW % 12.9 12.9   PLATELETS 10*3/mm3 308 362       Lab Results - Last 18 Months   Lab Units 03/24/21  0722   TSH uIU/mL 0.825   VIT D 25 HYDROXY ng/ml 37.4          Imaging Results (Most Recent)     None        Assessment/Plan   Diagnoses and all orders for this visit:    1. Hx of colonic polyps (Primary)  -     Case Request; Standing  -     Case Request    Other orders  -     Follow Anesthesia Guidelines / Protocol; Future  -     Obtain Informed Consent; Future  -     Implement Anesthesia Orders Day of Procedure; Standing  -     Obtain Informed Consent; Standing  -     Verify bowel prep was successful; Standing  -     Sodium Sulfate-Mag Sulfate-KCl 3106-855-830 MG tablet; Take 12 tablets by mouth Take As Directed.  Dispense: 24 tablet; Refill: 0      COLONOSCOPY WITH ANESTHESIA (N/A)             All risks, benefits, alternatives, and indications of colonoscopy procedure have been discussed with the patient. Risks to include perforation of the colon requiring possible surgery or colostomy, risk of bleeding from biopsies or removal of colon tissue, possibility of missing a colon polyp or cancer, or adverse drug reaction.  Benefits to include the diagnosis and management of disease of the colon and rectum. Alternatives to include barium enema, radiographic evaluation, lab testing or no intervention. Pt verbalizes  understanding and agrees.

## 2021-09-20 NOTE — PROGRESS NOTES
Chief Complaint   Patient presents with   • Colon Cancer Screening     Pt presents today for evaluation for colonoscopy-pt's last colon was 12/9/2013 by Dr. Chavez-pt's PCP states he had polyps but pt doesn't remember      Subjective   HPI  Selvin Garcia is a 55 y.o. male who presents as a referral for preventative maintenance. He has no complaints of nausea or vomiting. No change in bowels. No wt loss. No BRBPR. No melena. There is no family hx for colon cancer. No abdominal pain. There was no Cologuard screening this year.  The patient's last colonoscopy was performed per Dr. Chavez with a polyp on 12/9/2013 abstracted from PCPs note.    Past Medical History:   Diagnosis Date   • Abnormal CT of paranasal sinuses 08/20/2015    (Note 1 of 3)  NASAL SEPTUM:  The nasal septum is relatively midline.  MAXILLARY SINUSES:  The left maxillary sinus showed >5 m mucosal thickening and 50% opacification and the right maxillary sinus showed >5 mm mucosal thickening. The osteomeatal complex is obstructed bilaterally.   • Abnormal CT of paranasal sinuses 08/20/2015    (Note 2 of 3)  NASAL TURBINATES: The inferior turbinates showed bilateral hypertrophy. The Cherie bullosa are not present.  ETHMOID SINUSES: The left ethmoid sinus showed >3 mm mucosal thickening and complete opacification. The lamina papyracea appears intact bilaterally.   • Abnormal CT of paranasal sinuses 08/20/2015    (Note 3 of 3)  FRONTAL SINUSES:  The left frontal sinus showed complete opacification. The right frontal sinus showed complete opacification. SPENOID SINUSES: The left sphenoid sinus showed moderate mucosal thickening. The right sphenoid sinus showed moderate mucosal thickening.   • Allergic rhinitis 12/01/2015   • Chronic rhinitis    • Chronic sinusitis    • Disturbances of sensation of smell and taste 12/01/2015   • History of colon polyps    • Sensorineural hearing loss, bilateral 12/01/2015   • Sleep apnea    • Subjective tinnitus  12/01/2015     Past Surgical History:   Procedure Laterality Date   • COLONOSCOPY  12/09/2013    Dr. Chavez-Polyp; Repeat 5 years (Abstracted from PCP's note)   • ENDOSCOPY  08/14/2015    Dr. Mullins-Gastric ulcer; Repeat 8 weeks (Abstracted from PCP's note)   • ENDOSCOPY  10/16/2015    Dr. Mullins-Dr. Mullins (Abstracted from PCP's note)   • FUNCTIONAL ENDOSCOPIC SINUS SURGERY  07/26/2016   • HEMORRHOIDECTOMY     • KNEE ARTHROPLASTY, PARTIAL REPLACEMENT Left    • KNEE MENISCECTOMY Right     X 2   • OTHER SURGICAL HISTORY      Open Reduction-Internal Fixation   • ROTATOR CUFF REPAIR Right    • SEPTOPLASTY      Per Dr. Urrutia 7-8 years ago       Current Outpatient Medications:   •  celecoxib (CeleBREX) 200 MG capsule, TAKE 1 CAPSULE DAILY, Disp: 90 capsule, Rfl: 3  •  cetirizine (zyrTEC) 10 MG tablet, TAKE 1 TABLET DAILY, Disp: 90 tablet, Rfl: 3  •  fluticasone (FLONASE) 50 MCG/ACT nasal spray, 2 sprays into the nostril(s) as directed by provider Daily., Disp: 16 g, Rfl: 11  •  ketorolac (ACULAR) 0.5 % ophthalmic solution, Apply 1 drop to eye(s) as directed by provider 4 (Four) Times a Day., Disp: 3 mL, Rfl: 0  •  meloxicam (MOBIC) 15 MG tablet, Take 15 mg by mouth Daily., Disp: , Rfl:   •  methylphenidate (Concerta) 27 MG CR tablet, Take 1 tablet by mouth Every Morning, Disp: 30 tablet, Rfl: 0  •  montelukast (SINGULAIR) 10 MG tablet, Take 1 tablet by mouth Every Night. (Patient taking differently: Take 10 mg by mouth At Night As Needed.), Disp: 90 tablet, Rfl: 3  •  PROAIR  (90 Base) MCG/ACT inhaler, USE 2 INHALATIONS EVERY 4 HOURS AS NEEDED FOR WHEEZING, Disp: 25.5 g, Rfl: 0  •  Sodium Sulfate-Mag Sulfate-KCl 5620-773-009 MG tablet, Take 12 tablets by mouth Take As Directed., Disp: 24 tablet, Rfl: 0  Allergies   Allergen Reactions   • Lexapro [Escitalopram Oxalate] Itching   • Ultram [Tramadol Hcl] Itching     Social History     Socioeconomic History   • Marital status:      Spouse name: Kelsy  "()   • Number of children: 2   • Years of education: Not on file   • Highest education level: Not on file   Tobacco Use   • Smoking status: Never Smoker   • Smokeless tobacco: Never Used   Vaping Use   • Vaping Use: Never used   Substance and Sexual Activity   • Alcohol use: Yes     Comment: Current some day - 1 week   • Drug use: Defer   • Sexual activity: Defer     Family History   Problem Relation Age of Onset   • Diabetes Other    • No Known Problems Mother    • No Known Problems Father    • Colon polyps Neg Hx    • Colon cancer Neg Hx    • Esophageal cancer Neg Hx    • Liver cancer Neg Hx    • Liver disease Neg Hx    • Rectal cancer Neg Hx    • Stomach cancer Neg Hx        REVIEW OF SYSTEMS  General: well appearing, no fever chills or sweats, no unexplained wt loss  HEENT: no acute visual or hearing disturbances  Cardiovascular: No chest pain or palpitations  Pulmonary: No shortness of breath, coughing, wheezing or hemoptysis  : No burning, urgency, hematuria, or dysuria  Musculoskeletal: No joint pain or stiffness  Peripheral: no edema  Skin: No lesions or rashes  Neuro: No dizziness, headaches, stroke, syncope  Endocrine: No hot or cold intolerances  Hematological: No blood dyscrasias    Objective   Vitals:    09/20/21 1415   BP: 122/76   BP Location: Left arm   Patient Position: Sitting   Cuff Size: Adult   Pulse: 65   Temp: 97.1 °F (36.2 °C)   TempSrc: Infrared   SpO2: 98%   Weight: 81.6 kg (180 lb)   Height: 172.7 cm (68\")         PHYSICAL EXAM  General: age appropriate well nourished well appearing, no acute distress  Head: normocephalic and atraumatic  Global assessment-supple  Neck-No JVD noted, no lymphadenopathy  Pulmonary-clear to auscultation bilaterally, normal respiratory effort  Cardiovascular-normal rate and rhythm, normal heart sounds, S1 and S2 noted  Abdomen-soft, non tender, non distended, normal bowel sounds all 4 quadrants, no hepatosplenomegaly noted  Extremities-No clubbing " cyanosis or edema  Neuro-Non focal, converses appropriately, awake, alert, oriented    Lab Results - Last 18 Months   Lab Units 08/11/21  0726 03/24/21  0722   BUN mg/dL 15 20   CREATININE mg/dL 0.98 0.94   SODIUM mmol/L 140 140   POTASSIUM mmol/L 4.8 4.6   CHLORIDE mmol/L 105 106   TOTAL CO2 mmol/L 25.9 26.4   ALBUMIN g/dL 4.20 4.40   ALT (SGPT) U/L 12 13   AST (SGOT) U/L 19 18   ALK PHOS U/L 79 73   BILIRUBIN mg/dL 0.7 0.4       Lab Results - Last 18 Months   Lab Units 08/11/21  0726 03/24/21  0722   HEMOGLOBIN g/dL 16.2 15.8   HEMATOCRIT % 49.2 46.1   MCV fL 92.0 86.7   WBC 10*3/mm3 5.49 7.21   RDW % 12.9 12.9   PLATELETS 10*3/mm3 308 362       Lab Results - Last 18 Months   Lab Units 03/24/21  0722   TSH uIU/mL 0.825   VIT D 25 HYDROXY ng/ml 37.4          Imaging Results (Most Recent)     None        Assessment/Plan   Diagnoses and all orders for this visit:    1. Hx of colonic polyps (Primary)  -     Case Request; Standing  -     Case Request    Other orders  -     Follow Anesthesia Guidelines / Protocol; Future  -     Obtain Informed Consent; Future  -     Implement Anesthesia Orders Day of Procedure; Standing  -     Obtain Informed Consent; Standing  -     Verify bowel prep was successful; Standing  -     Sodium Sulfate-Mag Sulfate-KCl 0489-274-229 MG tablet; Take 12 tablets by mouth Take As Directed.  Dispense: 24 tablet; Refill: 0      COLONOSCOPY WITH ANESTHESIA (N/A)             All risks, benefits, alternatives, and indications of colonoscopy procedure have been discussed with the patient. Risks to include perforation of the colon requiring possible surgery or colostomy, risk of bleeding from biopsies or removal of colon tissue, possibility of missing a colon polyp or cancer, or adverse drug reaction.  Benefits to include the diagnosis and management of disease of the colon and rectum. Alternatives to include barium enema, radiographic evaluation, lab testing or no intervention. Pt verbalizes  understanding and agrees.

## 2021-09-24 ENCOUNTER — HOSPITAL ENCOUNTER (OUTPATIENT)
Facility: HOSPITAL | Age: 56
Setting detail: HOSPITAL OUTPATIENT SURGERY
Discharge: HOME | End: 2021-09-24
Attending: INTERNAL MEDICINE | Admitting: INTERNAL MEDICINE

## 2021-09-24 ENCOUNTER — ANESTHESIA EVENT (OUTPATIENT)
Dept: GASTROENTEROLOGY | Facility: HOSPITAL | Age: 56
End: 2021-09-24

## 2021-09-24 ENCOUNTER — ANESTHESIA (OUTPATIENT)
Dept: GASTROENTEROLOGY | Facility: HOSPITAL | Age: 56
End: 2021-09-24

## 2021-09-24 VITALS
DIASTOLIC BLOOD PRESSURE: 67 MMHG | HEART RATE: 49 BPM | HEIGHT: 68 IN | SYSTOLIC BLOOD PRESSURE: 110 MMHG | BODY MASS INDEX: 26.07 KG/M2 | TEMPERATURE: 98.1 F | RESPIRATION RATE: 18 BRPM | OXYGEN SATURATION: 98 % | WEIGHT: 172 LBS

## 2021-09-24 DIAGNOSIS — Z86.010 HX OF COLONIC POLYPS: ICD-10-CM

## 2021-09-24 PROBLEM — Z86.0101 HISTORY OF ADENOMATOUS POLYP OF COLON: Status: ACTIVE | Noted: 2021-09-20

## 2021-09-24 PROCEDURE — 88305 TISSUE EXAM BY PATHOLOGIST: CPT | Performed by: INTERNAL MEDICINE

## 2021-09-24 PROCEDURE — 25010000002 PROPOFOL 10 MG/ML EMULSION: Performed by: NURSE ANESTHETIST, CERTIFIED REGISTERED

## 2021-09-24 PROCEDURE — 45385 COLONOSCOPY W/LESION REMOVAL: CPT | Performed by: INTERNAL MEDICINE

## 2021-09-24 RX ORDER — SODIUM CHLORIDE 9 MG/ML
500 INJECTION, SOLUTION INTRAVENOUS CONTINUOUS PRN
Status: DISCONTINUED | OUTPATIENT
Start: 2021-09-24 | End: 2021-09-24 | Stop reason: HOSPADM

## 2021-09-24 RX ORDER — SODIUM CHLORIDE 0.9 % (FLUSH) 0.9 %
10 SYRINGE (ML) INJECTION AS NEEDED
Status: DISCONTINUED | OUTPATIENT
Start: 2021-09-24 | End: 2021-09-24 | Stop reason: HOSPADM

## 2021-09-24 RX ORDER — SODIUM CHLORIDE 0.9 % (FLUSH) 0.9 %
10 SYRINGE (ML) INJECTION AS NEEDED
Status: CANCELLED | OUTPATIENT
Start: 2021-09-24

## 2021-09-24 RX ORDER — LIDOCAINE HYDROCHLORIDE 20 MG/ML
INJECTION, SOLUTION EPIDURAL; INFILTRATION; INTRACAUDAL; PERINEURAL AS NEEDED
Status: DISCONTINUED | OUTPATIENT
Start: 2021-09-24 | End: 2021-09-24 | Stop reason: SURG

## 2021-09-24 RX ORDER — MIDAZOLAM HYDROCHLORIDE 1 MG/ML
1 INJECTION, SOLUTION INTRAMUSCULAR; INTRAVENOUS
Status: CANCELLED | OUTPATIENT
Start: 2021-09-24

## 2021-09-24 RX ORDER — AZELASTINE HYDROCHLORIDE 0.5 MG/ML
1 SOLUTION/ DROPS OPHTHALMIC 2 TIMES DAILY
COMMUNITY
End: 2022-04-14 | Stop reason: SDUPTHER

## 2021-09-24 RX ORDER — SODIUM CHLORIDE 0.9 % (FLUSH) 0.9 %
10 SYRINGE (ML) INJECTION EVERY 12 HOURS SCHEDULED
Status: CANCELLED | OUTPATIENT
Start: 2021-09-24

## 2021-09-24 RX ORDER — PROPOFOL 10 MG/ML
VIAL (ML) INTRAVENOUS AS NEEDED
Status: DISCONTINUED | OUTPATIENT
Start: 2021-09-24 | End: 2021-09-24 | Stop reason: SURG

## 2021-09-24 RX ORDER — SODIUM CHLORIDE 9 MG/ML
100 INJECTION, SOLUTION INTRAVENOUS CONTINUOUS
Status: CANCELLED | OUTPATIENT
Start: 2021-09-24

## 2021-09-24 RX ADMIN — LIDOCAINE HYDROCHLORIDE 40 MG: 20 INJECTION, SOLUTION EPIDURAL; INFILTRATION; INTRACAUDAL; PERINEURAL at 08:36

## 2021-09-24 RX ADMIN — SODIUM CHLORIDE 500 ML: 9 INJECTION, SOLUTION INTRAVENOUS at 08:02

## 2021-09-24 RX ADMIN — PROPOFOL 350 MG: 10 INJECTION, EMULSION INTRAVENOUS at 08:36

## 2021-09-24 NOTE — ANESTHESIA PREPROCEDURE EVALUATION
Anesthesia Evaluation     Patient summary reviewed   no history of anesthetic complications:  NPO Solid Status: > 6 hours  NPO Liquid Status: > 6 hours           Airway   Mallampati: II  Dental      Pulmonary    (-) sleep apnea  Cardiovascular   Exercise tolerance: excellent (>7 METS)    (-) pacemaker, past MI, cardiac stents, CABG      Neuro/Psych  (-) seizures, CVA  GI/Hepatic/Renal/Endo    (-) liver disease, no renal disease, diabetes    Musculoskeletal     Abdominal    Substance History      OB/GYN          Other                      Anesthesia Plan    ASA 2     MAC     intravenous induction     Anesthetic plan, all risks, benefits, and alternatives have been provided, discussed and informed consent has been obtained with: patient and spouse/significant other.

## 2021-09-24 NOTE — ANESTHESIA POSTPROCEDURE EVALUATION
Patient: Selvin Garcia    Procedure Summary     Date: 09/24/21 Room / Location:  [facility] /  [facility]    Anesthesia Start: 0834 Anesthesia Stop: 0852    Procedure: COLONOSCOPY WITH ANESTHESIA (N/A ) Diagnosis:       Hx of colonic polyps      (Hx of colonic polyps [Z86.010])    Surgeons: Isaura Fragoso MD Provider: Donny Saab CRNA    Anesthesia Type: MAC ASA Status: 2          Anesthesia Type: MAC    Vitals  Vitals Value Taken Time   /67 09/24/21 0911   Temp     Pulse 54 09/24/21 0913   Resp 18 09/24/21 0910   SpO2 100 % 09/24/21 0912   Vitals shown include unvalidated device data.        Post Anesthesia Care and Evaluation    Level of consciousness: awake  Pain management: adequate  Anesthetic complications: No anesthetic complications    Cardiovascular status: acceptable  Respiratory status: acceptable  Hydration status: acceptable

## 2021-09-27 LAB
CYTO UR: NORMAL
LAB AP CASE REPORT: NORMAL
PATH REPORT.FINAL DX SPEC: NORMAL
PATH REPORT.GROSS SPEC: NORMAL

## 2021-09-27 NOTE — PROGRESS NOTES
I am writing to inform you that the polyp removed from the colon did not have any cancer. It no longer poses a threat to you since it was completely removed.  However, in the future, it is possible that additional polyps might grow.  For this reason, we will repeat colonoscopy in 5 years as planned.    If you have any question, please call our office.    Sincerely,      Isaura Fragoso MD     oral

## 2021-10-13 ENCOUNTER — OFFICE VISIT (OUTPATIENT)
Dept: GASTROENTEROLOGY | Facility: CLINIC | Age: 56
End: 2021-10-13

## 2021-10-13 ENCOUNTER — LAB (OUTPATIENT)
Dept: LAB | Facility: HOSPITAL | Age: 56
End: 2021-10-13

## 2021-10-13 ENCOUNTER — PATIENT MESSAGE (OUTPATIENT)
Dept: FAMILY MEDICINE CLINIC | Facility: CLINIC | Age: 56
End: 2021-10-13

## 2021-10-13 VITALS
OXYGEN SATURATION: 95 % | HEIGHT: 68 IN | BODY MASS INDEX: 25.76 KG/M2 | WEIGHT: 170 LBS | DIASTOLIC BLOOD PRESSURE: 74 MMHG | SYSTOLIC BLOOD PRESSURE: 126 MMHG | TEMPERATURE: 97.6 F | HEART RATE: 76 BPM

## 2021-10-13 DIAGNOSIS — Z01.818 PREOPERATIVE TESTING: Primary | ICD-10-CM

## 2021-10-13 DIAGNOSIS — Z87.11 HISTORY OF GASTRIC ULCER: ICD-10-CM

## 2021-10-13 DIAGNOSIS — Z79.1 ENCOUNTER FOR LONG-TERM (CURRENT) USE OF NSAIDS: ICD-10-CM

## 2021-10-13 DIAGNOSIS — F98.8 ATTENTION DEFICIT DISORDER, UNSPECIFIED HYPERACTIVITY PRESENCE: ICD-10-CM

## 2021-10-13 DIAGNOSIS — R13.19 OTHER DYSPHAGIA: Primary | ICD-10-CM

## 2021-10-13 LAB — SARS-COV-2 ORF1AB RESP QL NAA+PROBE: NOT DETECTED

## 2021-10-13 PROCEDURE — 99214 OFFICE O/P EST MOD 30 MIN: CPT | Performed by: NURSE PRACTITIONER

## 2021-10-13 PROCEDURE — U0004 COV-19 TEST NON-CDC HGH THRU: HCPCS | Performed by: NURSE PRACTITIONER

## 2021-10-13 PROCEDURE — C9803 HOPD COVID-19 SPEC COLLECT: HCPCS | Performed by: NURSE PRACTITIONER

## 2021-10-13 NOTE — PROGRESS NOTES
"Chief Complaint:   Chief Complaint   Patient presents with   • Difficulty Swallowing     Pt c/o trouble swallowing \"for years\" but has been getting worse-states it feels like food will \"only go so far\" and then won't go down-pt states at times he has had to throw up to get relief          Patient ID: Selvin Garcia is a 55 y.o. male     History of Present Illness: This is a very pleasant 55-year-old male who is here today with complaints of difficulty swallowing.    The patient states he has had difficulty swallowing \"for years\", but more recently it has worsened.  The patient states \"I feel like food will only go down so far and then stops.\"  He notes at times he has to make himself vomit to get relief.  Abstracted from PCPs note patient underwent endoscopy per Dr. Mullins with gastric ulcer on 8/14/2015 with repeat on 10/16/2015.  The patient states that he has been taking Mobic on a daily basis for about 2 years approximately.  He states he does have prescription for Celebrex however alternates between the two.The patient denies any nausea, vomiting, epigastric pain, pyrosis or hematemesis.  The patient denies any fever or chills.  Denies any melena or hematochezia.  Denies any unintentional weight loss or loss of appetite.      Past Medical History:   Diagnosis Date   • Abnormal CT of paranasal sinuses 08/20/2015    (Note 1 of 3)  NASAL SEPTUM:  The nasal septum is relatively midline.  MAXILLARY SINUSES:  The left maxillary sinus showed >5 m mucosal thickening and 50% opacification and the right maxillary sinus showed >5 mm mucosal thickening. The osteomeatal complex is obstructed bilaterally.   • Abnormal CT of paranasal sinuses 08/20/2015    (Note 2 of 3)  NASAL TURBINATES: The inferior turbinates showed bilateral hypertrophy. The Cherie bullosa are not present.  ETHMOID SINUSES: The left ethmoid sinus showed >3 mm mucosal thickening and complete opacification. The lamina papyracea appears intact " bilaterally.   • Abnormal CT of paranasal sinuses 08/20/2015    (Note 3 of 3)  FRONTAL SINUSES:  The left frontal sinus showed complete opacification. The right frontal sinus showed complete opacification. SPENOID SINUSES: The left sphenoid sinus showed moderate mucosal thickening. The right sphenoid sinus showed moderate mucosal thickening.   • Allergic rhinitis 12/01/2015   • Chronic rhinitis    • Chronic sinusitis    • Disturbances of sensation of smell and taste 12/01/2015   • Diverticulosis    • History of adenomatous polyp of colon    • History of colon polyps    • Sensorineural hearing loss, bilateral 12/01/2015   • Sleep apnea     patient states he does not have sleep apnea   • Subjective tinnitus 12/01/2015       Past Surgical History:   Procedure Laterality Date   • COLONOSCOPY  12/09/2013    Dr. Chavez-Polyp; Repeat 5 years (Abstracted from PCP's note)   • COLONOSCOPY N/A 9/24/2021    One 6mm tubular adenomatous polyp in the ascending colon-Resected and retrieved-Clip (MR conditional) was placed; Diverticulosis in the left colon; Non-bleeding internal hemorrhoids; Repeat 5 years    • ENDOSCOPY  08/14/2015    Dr. Mullins-Gastric ulcer; Repeat 8 weeks (Abstracted from PCP's note)   • ENDOSCOPY  10/16/2015    Dr. Mullins-Dr. Mullins (Abstracted from PCP's note)   • FUNCTIONAL ENDOSCOPIC SINUS SURGERY  07/26/2016   • HEMORRHOIDECTOMY     • KNEE ARTHROPLASTY, PARTIAL REPLACEMENT Left    • KNEE MENISCECTOMY Right     X 2   • OTHER SURGICAL HISTORY      Open Reduction-Internal Fixation   • ROTATOR CUFF REPAIR Right    • SEPTOPLASTY      Per Dr. Urrutia 7-8 years ago         Current Outpatient Medications:   •  azelastine (OPTIVAR) 0.05 % ophthalmic solution, 1 drop 2 (Two) Times a Day., Disp: , Rfl:   •  celecoxib (CeleBREX) 200 MG capsule, TAKE 1 CAPSULE DAILY, Disp: 90 capsule, Rfl: 3  •  cetirizine (zyrTEC) 10 MG tablet, TAKE 1 TABLET DAILY, Disp: 90 tablet, Rfl: 3  •  fluticasone (FLONASE) 50 MCG/ACT nasal  "spray, 2 sprays into the nostril(s) as directed by provider Daily., Disp: 16 g, Rfl: 11  •  ketorolac (ACULAR) 0.5 % ophthalmic solution, Apply 1 drop to eye(s) as directed by provider 4 (Four) Times a Day., Disp: 3 mL, Rfl: 0  •  meloxicam (MOBIC) 15 MG tablet, Take 15 mg by mouth Daily., Disp: , Rfl:   •  methylphenidate (Concerta) 27 MG CR tablet, Take 1 tablet by mouth Every Morning, Disp: 30 tablet, Rfl: 0  •  montelukast (SINGULAIR) 10 MG tablet, Take 1 tablet by mouth Every Night. (Patient taking differently: Take 10 mg by mouth At Night As Needed.), Disp: 90 tablet, Rfl: 3  •  PROAIR  (90 Base) MCG/ACT inhaler, USE 2 INHALATIONS EVERY 4 HOURS AS NEEDED FOR WHEEZING, Disp: 25.5 g, Rfl: 0    Allergies   Allergen Reactions   • Lexapro [Escitalopram Oxalate] Itching   • Ultram [Tramadol Hcl] Itching       Social History     Socioeconomic History   • Marital status:      Spouse name: Kelsy ()   • Number of children: 2   Tobacco Use   • Smoking status: Never Smoker   • Smokeless tobacco: Never Used   Vaping Use   • Vaping Use: Never used   Substance and Sexual Activity   • Alcohol use: Yes     Comment: Occasionally    • Drug use: Never   • Sexual activity: Defer       Family History   Problem Relation Age of Onset   • Diabetes Other    • No Known Problems Mother    • No Known Problems Father    • Colon polyps Neg Hx    • Colon cancer Neg Hx    • Esophageal cancer Neg Hx    • Liver cancer Neg Hx    • Liver disease Neg Hx    • Rectal cancer Neg Hx    • Stomach cancer Neg Hx        Vitals:    10/13/21 1410   BP: 126/74   BP Location: Left arm   Patient Position: Sitting   Cuff Size: Adult   Pulse: 76   Temp: 97.6 °F (36.4 °C)   TempSrc: Infrared   SpO2: 95%   Weight: 77.1 kg (170 lb)   Height: 172.7 cm (68\")       Review of Systems:    General:    Present -feeling well   Skin:    Not Present-Rash   HEENT:     Not Present-Acute visual changes or Acute hearing changes   Neck :    Not Present- " swollen glands   Genitourinary:      Not Present- burning, frequency, urgency hematuria, dysuria,   Cardiovascular:   Not Present-chest pain, palpitations, or pressure   Respiratory:   Not Present- shortness of breath or cough   Gastrointestinal:  Musculoskeletal:  Neurological:  Psychiatric:   Present as mentioned in the HP    Not Present. Recent gait disturbances.    Not Present-Seizures and weakness in extremities.    Not Present- Anxiety or Depression.       Physical Exam:    General Appearance:    Alert, cooperative, in no acute distress   Psych:    Mood appropriate    Eyes:          conjunctivae and sclerae normal, no   icterus, no pallor   ENMT:    Ears appear intact with no abnormalities noted oral mucosa moist   Neck:   No adenopathy, supple, trachea midline, no thyromegaly, no   carotid bruit, no JVD    Cardiovascular:    Regular rhythm and normal rate, normal S1 and S2, no            murmur, no gallop, no rub, no click   Gastrointestinal:     Inspection normal.  Normal bowel sounds, no masses, no organomegaly, soft round non-tender, non-distended, no guarding, no rebound or tenderness. No hepatosplenomegaly.   Skin:   No bleeding, bruising or rash   Neurologic:   nonfocal       Lab Results - Last 18 Months   Lab Units 08/11/21  0726 03/24/21  0722   BUN mg/dL 15 20   CREATININE mg/dL 0.98 0.94   SODIUM mmol/L 140 140   POTASSIUM mmol/L 4.8 4.6   CHLORIDE mmol/L 105 106   TOTAL CO2 mmol/L 25.9 26.4   ALBUMIN g/dL 4.20 4.40   ALT (SGPT) U/L 12 13   AST (SGOT) U/L 19 18   ALK PHOS U/L 79 73   BILIRUBIN mg/dL 0.7 0.4       Lab Results - Last 18 Months   Lab Units 08/11/21  0726 03/24/21  0722   HEMOGLOBIN g/dL 16.2 15.8   HEMATOCRIT % 49.2 46.1   MCV fL 92.0 86.7   WBC 10*3/mm3 5.49 7.21   RDW % 12.9 12.9   PLATELETS 10*3/mm3 308 362       Lab Results - Last 18 Months   Lab Units 03/24/21  0722   TSH uIU/mL 0.825   VIT D 25 HYDROXY ng/ml 37.4          Assessment and Plan:  Assessment/Plan   Diagnoses and all  orders for this visit:    1. Other dysphagia (Primary)  -     Case Request; Standing  -     Case Request    2. History of gastric ulcer  -     Case Request; Standing  -     Case Request    3. Encounter for long-term (current) use of NSAIDs  -     Case Request; Standing  -     Case Request    Other orders  -     Follow Anesthesia Guidelines / Protocol; Future  -     Obtain Informed Consent; Future      Schedule patient for EGD.  Patient instructed to discontinue all NSAID use.     There are no Patient Instructions on file for this visit.    Next follow-up appointment    The risks, benefits, and alternatives of endoscopy were reviewed with the patient today.  Risks including perforation, with or without dilation, possibly requiring surgery.  Additional risks include risk of bleeding.  There is also the risk of a drug reaction or problems with anesthesia.  This will be discussed with the further by the anesthesia team on the day of the procedure. The benefits include the diagnosis and management of disease of the upper digestive tract.  Alternatives to endoscopy include upper GI series, laboratory testing, radiographic evaluation, or no intervention.  The patient verbalizes understanding and agrees.      EMR Dragon/Transcription disclaimer:  Much of this encounter note is an electronic transcription/translation of spoken language to printed text. The electronic translation of spoken language may permit erroneous, or at times, nonsensical words or phrases to be inadvertently transcribed; although I have reviewed the note for such errors, some may still exist.

## 2021-10-14 RX ORDER — METHYLPHENIDATE HYDROCHLORIDE 27 MG/1
27 TABLET ORAL EVERY MORNING
Qty: 30 TABLET | Refills: 0 | Status: SHIPPED | OUTPATIENT
Start: 2021-10-14 | End: 2021-11-12 | Stop reason: SDUPTHER

## 2021-10-15 ENCOUNTER — HOSPITAL ENCOUNTER (OUTPATIENT)
Facility: HOSPITAL | Age: 56
Setting detail: HOSPITAL OUTPATIENT SURGERY
Discharge: HOME | End: 2021-10-15
Attending: INTERNAL MEDICINE | Admitting: INTERNAL MEDICINE

## 2021-10-15 ENCOUNTER — ANESTHESIA (OUTPATIENT)
Dept: GASTROENTEROLOGY | Facility: HOSPITAL | Age: 56
End: 2021-10-15

## 2021-10-15 ENCOUNTER — ANESTHESIA EVENT (OUTPATIENT)
Dept: GASTROENTEROLOGY | Facility: HOSPITAL | Age: 56
End: 2021-10-15

## 2021-10-15 VITALS
HEIGHT: 68 IN | WEIGHT: 179.8 LBS | BODY MASS INDEX: 27.25 KG/M2 | RESPIRATION RATE: 17 BRPM | HEART RATE: 66 BPM | TEMPERATURE: 97 F | SYSTOLIC BLOOD PRESSURE: 115 MMHG | DIASTOLIC BLOOD PRESSURE: 71 MMHG | OXYGEN SATURATION: 96 %

## 2021-10-15 PROCEDURE — 43249 ESOPH EGD DILATION <30 MM: CPT | Performed by: INTERNAL MEDICINE

## 2021-10-15 PROCEDURE — 25010000002 PROPOFOL 10 MG/ML EMULSION: Performed by: NURSE ANESTHETIST, CERTIFIED REGISTERED

## 2021-10-15 PROCEDURE — C1726 CATH, BAL DIL, NON-VASCULAR: HCPCS | Performed by: INTERNAL MEDICINE

## 2021-10-15 RX ORDER — LIDOCAINE HYDROCHLORIDE 20 MG/ML
INJECTION, SOLUTION EPIDURAL; INFILTRATION; INTRACAUDAL; PERINEURAL AS NEEDED
Status: DISCONTINUED | OUTPATIENT
Start: 2021-10-15 | End: 2021-10-15 | Stop reason: SURG

## 2021-10-15 RX ORDER — SODIUM CHLORIDE 0.9 % (FLUSH) 0.9 %
10 SYRINGE (ML) INJECTION AS NEEDED
Status: DISCONTINUED | OUTPATIENT
Start: 2021-10-15 | End: 2021-10-15 | Stop reason: HOSPADM

## 2021-10-15 RX ORDER — LIDOCAINE HYDROCHLORIDE 10 MG/ML
0.5 INJECTION, SOLUTION EPIDURAL; INFILTRATION; INTRACAUDAL; PERINEURAL ONCE AS NEEDED
Status: DISCONTINUED | OUTPATIENT
Start: 2021-10-15 | End: 2021-10-15 | Stop reason: HOSPADM

## 2021-10-15 RX ORDER — SODIUM CHLORIDE 9 MG/ML
500 INJECTION, SOLUTION INTRAVENOUS CONTINUOUS PRN
Status: DISCONTINUED | OUTPATIENT
Start: 2021-10-15 | End: 2021-10-15 | Stop reason: HOSPADM

## 2021-10-15 RX ORDER — PROPOFOL 10 MG/ML
VIAL (ML) INTRAVENOUS AS NEEDED
Status: DISCONTINUED | OUTPATIENT
Start: 2021-10-15 | End: 2021-10-15 | Stop reason: SURG

## 2021-10-15 RX ORDER — PANTOPRAZOLE SODIUM 40 MG/1
40 TABLET, DELAYED RELEASE ORAL DAILY
Qty: 30 TABLET | Refills: 11 | Status: SHIPPED | OUTPATIENT
Start: 2021-10-15 | End: 2022-03-02

## 2021-10-15 RX ADMIN — LIDOCAINE HYDROCHLORIDE 150 MG: 20 INJECTION, SOLUTION EPIDURAL; INFILTRATION; INTRACAUDAL; PERINEURAL at 10:54

## 2021-10-15 RX ADMIN — GLYCOPYRROLATE 0.1 MG: 0.2 INJECTION, SOLUTION INTRAMUSCULAR; INTRAVENOUS at 10:52

## 2021-10-15 RX ADMIN — PROPOFOL 300 MG: 10 INJECTION, EMULSION INTRAVENOUS at 10:54

## 2021-10-15 RX ADMIN — SODIUM CHLORIDE 500 ML: 9 INJECTION, SOLUTION INTRAVENOUS at 10:48

## 2021-10-15 NOTE — ANESTHESIA POSTPROCEDURE EVALUATION
"Patient: Selvin Garcia    Procedure Summary     Date: 10/15/21 Room / Location: [facility]COPY 4 / [facility]    Anesthesia Start: 1051 Anesthesia Stop: 1106    Procedure: ESOPHAGOGASTRODUODENOSCOPY WITH ANESTHESIA (N/A ) Diagnosis:       Other dysphagia      History of gastric ulcer      Encounter for long-term (current) use of NSAIDs      (Other dysphagia [R13.19])      (History of gastric ulcer [Z87.11])      (Encounter for long-term (current) use of NSAIDs [Z79.1])    Surgeons: Isaura Fragoso MD Provider: Collin Pedersen CRNA    Anesthesia Type: MAC ASA Status: 2          Anesthesia Type: MAC    Vitals  No vitals data found for the desired time range.          Post Anesthesia Care and Evaluation    Patient location during evaluation: PHASE II  Patient participation: waiting for patient participation  Level of consciousness: responsive to noxious stimuli  Pain score: 0  Pain management: adequate  Airway patency: patent  Anesthetic complications: No anesthetic complications  PONV Status: none  Cardiovascular status: acceptable  Respiratory status: acceptable  Hydration status: acceptable    Comments: Blood pressure 139/79, pulse 56, temperature 97 °F (36.1 °C), temperature source Temporal, resp. rate 18, height 172.7 cm (68\"), weight 81.6 kg (179 lb 12.8 oz), SpO2 97 %.    Pt discharged from PACU based on de score >8      " Recieved pt back from PACU. Sleeping and stable at this time.

## 2021-10-23 ENCOUNTER — APPOINTMENT (OUTPATIENT)
Dept: GENERAL RADIOLOGY | Facility: HOSPITAL | Age: 56
End: 2021-10-23

## 2021-10-23 ENCOUNTER — HOSPITAL ENCOUNTER (EMERGENCY)
Facility: HOSPITAL | Age: 56
Discharge: HOME | End: 2021-10-23
Attending: EMERGENCY MEDICINE | Admitting: EMERGENCY MEDICINE

## 2021-10-23 ENCOUNTER — APPOINTMENT (OUTPATIENT)
Dept: CT IMAGING | Facility: HOSPITAL | Age: 56
End: 2021-10-23

## 2021-10-23 VITALS
HEART RATE: 71 BPM | BODY MASS INDEX: 25.76 KG/M2 | OXYGEN SATURATION: 100 % | HEIGHT: 68 IN | RESPIRATION RATE: 20 BRPM | TEMPERATURE: 98.3 F | DIASTOLIC BLOOD PRESSURE: 78 MMHG | SYSTOLIC BLOOD PRESSURE: 118 MMHG | WEIGHT: 170 LBS
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
## 2021-10-23 DIAGNOSIS — S22.42XA CLOSED FRACTURE OF MULTIPLE RIBS OF LEFT SIDE, INITIAL ENCOUNTER: Primary | ICD-10-CM

## 2021-10-23 DIAGNOSIS — S00.03XA CONTUSION OF SCALP, INITIAL ENCOUNTER: ICD-10-CM

## 2021-10-23 LAB
ALBUMIN SERPL-MCNC: 4.1 G/DL (ref 3.5–5.2)
ALBUMIN/GLOB SERPL: 1.6 G/DL
ALP SERPL-CCNC: 77 U/L (ref 39–117)
ALT SERPL W P-5'-P-CCNC: 11 U/L (ref 1–41)
ANION GAP SERPL CALCULATED.3IONS-SCNC: 6 MMOL/L (ref 5–15)
AST SERPL-CCNC: 18 U/L (ref 1–40)
BASOPHILS # BLD AUTO: 0.07 10*3/MM3 (ref 0–0.2)
BASOPHILS NFR BLD AUTO: 0.8 % (ref 0–1.5)
BILIRUB SERPL-MCNC: 0.8 MG/DL (ref 0–1.2)
BUN SERPL-MCNC: 17 MG/DL (ref 6–20)
BUN/CREAT SERPL: 20.2 (ref 7–25)
CALCIUM SPEC-SCNC: 8.9 MG/DL (ref 8.6–10.5)
CHLORIDE SERPL-SCNC: 106 MMOL/L (ref 98–107)
CO2 SERPL-SCNC: 30 MMOL/L (ref 22–29)
CREAT SERPL-MCNC: 0.84 MG/DL (ref 0.76–1.27)
DEPRECATED RDW RBC AUTO: 39.2 FL (ref 37–54)
EOSINOPHIL # BLD AUTO: 0.48 10*3/MM3 (ref 0–0.4)
EOSINOPHIL NFR BLD AUTO: 5.7 % (ref 0.3–6.2)
ERYTHROCYTE [DISTWIDTH] IN BLOOD BY AUTOMATED COUNT: 12.8 % (ref 12.3–15.4)
GFR SERPL CREATININE-BSD FRML MDRD: 95 ML/MIN/1.73
GLOBULIN UR ELPH-MCNC: 2.5 GM/DL
GLUCOSE SERPL-MCNC: 116 MG/DL (ref 65–99)
HCT VFR BLD AUTO: 45.4 % (ref 37.5–51)
HGB BLD-MCNC: 15.8 G/DL (ref 13–17.7)
IMM GRANULOCYTES # BLD AUTO: 0.04 10*3/MM3 (ref 0–0.05)
IMM GRANULOCYTES NFR BLD AUTO: 0.5 % (ref 0–0.5)
LYMPHOCYTES # BLD AUTO: 1.1 10*3/MM3 (ref 0.7–3.1)
LYMPHOCYTES NFR BLD AUTO: 13.1 % (ref 19.6–45.3)
MCH RBC QN AUTO: 29.7 PG (ref 26.6–33)
MCHC RBC AUTO-ENTMCNC: 34.8 G/DL (ref 31.5–35.7)
MCV RBC AUTO: 85.3 FL (ref 79–97)
MONOCYTES # BLD AUTO: 0.67 10*3/MM3 (ref 0.1–0.9)
MONOCYTES NFR BLD AUTO: 8 % (ref 5–12)
NEUTROPHILS NFR BLD AUTO: 6.06 10*3/MM3 (ref 1.7–7)
NEUTROPHILS NFR BLD AUTO: 71.9 % (ref 42.7–76)
NRBC BLD AUTO-RTO: 0 /100 WBC (ref 0–0.2)
PLATELET # BLD AUTO: 287 10*3/MM3 (ref 140–450)
PMV BLD AUTO: 9.2 FL (ref 6–12)
POTASSIUM SERPL-SCNC: 4.2 MMOL/L (ref 3.5–5.2)
PROT SERPL-MCNC: 6.6 G/DL (ref 6–8.5)
RBC # BLD AUTO: 5.32 10*6/MM3 (ref 4.14–5.8)
SODIUM SERPL-SCNC: 142 MMOL/L (ref 136–145)
WBC # BLD AUTO: 8.42 10*3/MM3 (ref 3.4–10.8)

## 2021-10-23 PROCEDURE — 96375 TX/PRO/DX INJ NEW DRUG ADDON: CPT

## 2021-10-23 PROCEDURE — 36415 COLL VENOUS BLD VENIPUNCTURE: CPT

## 2021-10-23 PROCEDURE — 85025 COMPLETE CBC W/AUTO DIFF WBC: CPT | Performed by: EMERGENCY MEDICINE

## 2021-10-23 PROCEDURE — 71260 CT THORAX DX C+: CPT

## 2021-10-23 PROCEDURE — 74177 CT ABD & PELVIS W/CONTRAST: CPT

## 2021-10-23 PROCEDURE — 93005 ELECTROCARDIOGRAM TRACING: CPT | Performed by: EMERGENCY MEDICINE

## 2021-10-23 PROCEDURE — 25010000002 ONDANSETRON PER 1 MG: Performed by: EMERGENCY MEDICINE

## 2021-10-23 PROCEDURE — 93010 ELECTROCARDIOGRAM REPORT: CPT | Performed by: INTERNAL MEDICINE

## 2021-10-23 PROCEDURE — 70450 CT HEAD/BRAIN W/O DYE: CPT

## 2021-10-23 PROCEDURE — 80053 COMPREHEN METABOLIC PANEL: CPT | Performed by: EMERGENCY MEDICINE

## 2021-10-23 PROCEDURE — 99283 EMERGENCY DEPT VISIT LOW MDM: CPT

## 2021-10-23 PROCEDURE — 25010000002 IOPAMIDOL 61 % SOLUTION: Performed by: EMERGENCY MEDICINE

## 2021-10-23 PROCEDURE — 71045 X-RAY EXAM CHEST 1 VIEW: CPT

## 2021-10-23 PROCEDURE — 72125 CT NECK SPINE W/O DYE: CPT

## 2021-10-23 PROCEDURE — 25010000002 FENTANYL CITRATE (PF) 50 MCG/ML SOLUTION: Performed by: EMERGENCY MEDICINE

## 2021-10-23 PROCEDURE — 96374 THER/PROPH/DIAG INJ IV PUSH: CPT

## 2021-10-23 RX ORDER — FENTANYL CITRATE 50 UG/ML
50 INJECTION, SOLUTION INTRAMUSCULAR; INTRAVENOUS ONCE
Status: COMPLETED | OUTPATIENT
Start: 2021-10-23 | End: 2021-10-23

## 2021-10-23 RX ORDER — ONDANSETRON 2 MG/ML
4 INJECTION INTRAMUSCULAR; INTRAVENOUS ONCE
Status: COMPLETED | OUTPATIENT
Start: 2021-10-23 | End: 2021-10-23

## 2021-10-23 RX ORDER — OXYCODONE AND ACETAMINOPHEN 7.5; 325 MG/1; MG/1
1 TABLET ORAL EVERY 6 HOURS PRN
Qty: 10 TABLET | Refills: 0 | Status: SHIPPED | OUTPATIENT
Start: 2021-10-23 | End: 2021-10-25 | Stop reason: SDUPTHER

## 2021-10-23 RX ADMIN — IOPAMIDOL 100 ML: 612 INJECTION, SOLUTION INTRAVENOUS at 14:42

## 2021-10-23 RX ADMIN — ONDANSETRON 4 MG: 2 INJECTION INTRAMUSCULAR; INTRAVENOUS at 13:56

## 2021-10-23 RX ADMIN — FENTANYL CITRATE 50 MCG: 50 INJECTION INTRAMUSCULAR; INTRAVENOUS at 13:57

## 2021-10-23 NOTE — ED PROVIDER NOTES
Subjective   Patient is 55 years old who fell down five steps coming down the staircase did not lose consciousness didn't hit his head is complaining of severe left-sided chest pain.  No nausea no vomiting some shortness of breath.  Something came ER for evaluation not tachycardic      Trauma  Mechanism of injury: fall  Injury location: torso  Injury location detail: L chest  Incident location: home  Arrived directly from scene: no     Fall:       Fall occurred: down stairs and tripped       Impact surface: hard floor       Point of impact: Left chest.    Protective equipment:        None       Suspicion of alcohol use: no       Suspicion of drug use: no    EMS/PTA data:       Bystander interventions: none       Ambulatory at scene: yes       Blood loss: none       Responsiveness: alert       Oriented to: person, place, situation and time       Amnesic to event: no       Breathing interventions: none       Airway condition since incident: stable       Breathing condition since incident: stable       Circulation condition since incident: stable       Mental status condition since incident: stable       Disability condition since incident: stable    Current symptoms:       Associated symptoms:             Denies abdominal pain, chest pain, headache, nausea and vomiting.     Relevant PMH:       Medical risk factors:             No asthma, CAD, CHF, past MI, CABG, cardiac stents, AICD, pacemaker, hemophilia, diabetes or kidney disease.        Pharmacological risk factors:             No anticoagulation therapy or beta blocker therapy.        Tetanus status: UTD       The patient has not been admitted to the hospital due to injury in the past year.      Review of Systems   Constitutional: Negative.  Negative for chills, fatigue and fever.   HENT: Negative.  Negative for congestion.    Respiratory: Negative.  Negative for cough, chest tightness and stridor.    Cardiovascular: Negative.  Negative for chest pain.    Gastrointestinal: Negative.  Negative for abdominal distention, abdominal pain, nausea and vomiting.   Endocrine: Negative.    Genitourinary: Negative.  Negative for difficulty urinating and flank pain.   Musculoskeletal: Negative.    Skin: Negative.  Negative for color change.   Neurological: Negative.  Negative for dizziness and headaches.   All other systems reviewed and are negative.      Past Medical History:   Diagnosis Date   • Abnormal CT of paranasal sinuses 08/20/2015    (Note 1 of 3)  NASAL SEPTUM:  The nasal septum is relatively midline.  MAXILLARY SINUSES:  The left maxillary sinus showed >5 m mucosal thickening and 50% opacification and the right maxillary sinus showed >5 mm mucosal thickening. The osteomeatal complex is obstructed bilaterally.   • Abnormal CT of paranasal sinuses 08/20/2015    (Note 2 of 3)  NASAL TURBINATES: The inferior turbinates showed bilateral hypertrophy. The Cherie bullosa are not present.  ETHMOID SINUSES: The left ethmoid sinus showed >3 mm mucosal thickening and complete opacification. The lamina papyracea appears intact bilaterally.   • Abnormal CT of paranasal sinuses 08/20/2015    (Note 3 of 3)  FRONTAL SINUSES:  The left frontal sinus showed complete opacification. The right frontal sinus showed complete opacification. SPENOID SINUSES: The left sphenoid sinus showed moderate mucosal thickening. The right sphenoid sinus showed moderate mucosal thickening.   • Allergic rhinitis 12/01/2015   • Chronic rhinitis    • Chronic sinusitis    • Disturbances of sensation of smell and taste 12/01/2015   • Diverticulosis    • History of adenomatous polyp of colon    • History of colon polyps    • Sensorineural hearing loss, bilateral 12/01/2015   • Subjective tinnitus 12/01/2015       No Known Allergies    Past Surgical History:   Procedure Laterality Date   • COLONOSCOPY  12/09/2013    Dr. Chavez-Polyp; Repeat 5 years (Abstracted from PCP's note)   • COLONOSCOPY N/A 9/24/2021     One 6mm tubular adenomatous polyp in the ascending colon-Resected and retrieved-Clip (MR conditional) was placed; Diverticulosis in the left colon; Non-bleeding internal hemorrhoids; Repeat 5 years    • ENDOSCOPY  08/14/2015    Dr. Mullins-Gastric ulcer; Repeat 8 weeks (Abstracted from PCP's note)   • ENDOSCOPY  10/16/2015    Dr. Mullins-Dr. Mullins (Abstracted from PCP's note)   • ENDOSCOPY N/A 10/15/2021    Procedure: ESOPHAGOGASTRODUODENOSCOPY WITH ANESTHESIA;  Surgeon: Isaura Fragoso MD;  Location: [facility] ENDOSCOPY;  Service: Gastroenterology;  Laterality: N/A;  pre screen  post balloon  Dr. Pedersen   • FRACTURE SURGERY     • FUNCTIONAL ENDOSCOPIC SINUS SURGERY  07/26/2016   • HEMORRHOIDECTOMY     • KNEE ARTHROPLASTY, PARTIAL REPLACEMENT Left    • KNEE MENISCECTOMY Right     X 2   • OTHER SURGICAL HISTORY      Open Reduction-Internal Fixation   • ROTATOR CUFF REPAIR Right    • SEPTOPLASTY      Per Dr. Urrutia 7-8 years ago       Family History   Problem Relation Age of Onset   • Diabetes Other    • No Known Problems Mother    • No Known Problems Father    • Colon polyps Neg Hx    • Colon cancer Neg Hx    • Esophageal cancer Neg Hx    • Liver cancer Neg Hx    • Liver disease Neg Hx    • Rectal cancer Neg Hx    • Stomach cancer Neg Hx        Social History     Socioeconomic History   • Marital status:      Spouse name: Kelsy ()   • Number of children: 2   Tobacco Use   • Smoking status: Never Smoker   • Smokeless tobacco: Never Used   Vaping Use   • Vaping Use: Never used   Substance and Sexual Activity   • Alcohol use: Yes     Comment: Occasionally    • Drug use: Never   • Sexual activity: Defer           Objective   Physical Exam  Vitals and nursing note reviewed. Exam conducted with a chaperone present.   Constitutional:       General: He is awake. He is not in acute distress.     Appearance: Normal appearance. He is well-developed. He is not ill-appearing or diaphoretic.   HENT:      Head:  Normocephalic. No raccoon eyes, Escudero's sign, abrasion, contusion or laceration.      Jaw: There is normal jaw occlusion. No tenderness.      Right Ear: Tympanic membrane and external ear normal.      Left Ear: Tympanic membrane and external ear normal.      Nose: Nose normal.   Eyes:      General: Lids are normal.      Extraocular Movements: Extraocular movements intact.      Conjunctiva/sclera: Conjunctivae normal.      Pupils: Pupils are equal, round, and reactive to light.   Neck:      Vascular: Normal carotid pulses. No JVD.      Trachea: Trachea normal. No tracheal tenderness or tracheal deviation.   Cardiovascular:      Rate and Rhythm: Normal rate and regular rhythm.      Pulses: Normal pulses.      Heart sounds: Normal heart sounds.   Pulmonary:      Effort: Pulmonary effort is normal. No accessory muscle usage or respiratory distress.      Breath sounds: Normal breath sounds and air entry. No stridor.   Chest:      Chest wall: Tenderness present. No mass, lacerations, deformity, swelling or crepitus.   Breasts:      Right: Normal.      Left: Tenderness present.       Abdominal:      General: Abdomen is flat. Bowel sounds are normal. There is no distension.      Palpations: Abdomen is soft. There is no pulsatile mass.      Tenderness: There is no abdominal tenderness. There is no right CVA tenderness or left CVA tenderness.      Hernia: No hernia is present.   Musculoskeletal:         General: No tenderness or deformity. Normal range of motion.      Cervical back: Normal range of motion and neck supple. No deformity, rigidity, tenderness, bony tenderness or crepitus. No spinous process tenderness or muscular tenderness. Normal range of motion.      Thoracic back: Normal. No spasms, tenderness or bony tenderness.      Lumbar back: Normal. No deformity, tenderness or bony tenderness. Normal range of motion.      Right lower leg: No edema.      Left lower leg: No edema.      Comments: C-spine L-spine C-spine  negative step-off laxity tenderness   Skin:     General: Skin is warm and dry.      Coloration: Skin is not cyanotic, mottled or pale.   Neurological:      General: No focal deficit present.      Mental Status: He is alert and oriented to person, place, and time. Mental status is at baseline.      GCS: GCS eye subscore is 4. GCS verbal subscore is 5. GCS motor subscore is 6.      Cranial Nerves: Cranial nerves are intact. No cranial nerve deficit.      Sensory: Sensation is intact. No sensory deficit.      Motor: Motor function is intact. No weakness or abnormal muscle tone.      Coordination: Coordination is intact.      Deep Tendon Reflexes: Reflexes are normal and symmetric. Reflexes normal.   Psychiatric:         Speech: Speech normal.         Behavior: Behavior normal. Behavior is cooperative.         Procedures           ED Course  ED Course as of 10/23/21 1605   Sat Oct 23, 2021   1332 Patient is 18 or older, presenting with minor blunt head trauma. Head CT (including cosigned orders) was ordered by an Emergency Care Clinician for trauma because patient fell down steps hit his head with significant chest wall pain    [TS]   1346 Normal sinus [TS]   1514  No acute finding in the abdomen or pelvis.  2.  Nondisplaced acute LEFT lateral fifth and sixth rib fractures.  3.  Nonobstructing punctate LEFT lower pole renal calculus.  This was discussed with the patient [TS]   1514 Acute nondisplaced fractures of the LEFT lateral fourth, fifth, and  sixth ribs.  2.  No other acute traumatic finding. No pneumothorax.  3.  Mild dependent atelectasis in both lower lungs. [TS]   1514  No acute intracranial finding.  2.  Small LEFT frontal scalp contusion.  3.  Paranasal sinus and plantar change.  This was discussed the patient [TS]      ED Course User Index  [TS] Paolo Morales MD                                           [facility]    Final diagnoses:   Closed fracture of multiple ribs of left side, initial encounter   Contusion  of scalp, initial encounter   Fall, initial encounter       ED Disposition  ED Disposition     ED Disposition Condition Comment    Discharge Stable           No follow-up provider specified.       Medication List      New Prescriptions    oxyCODONE-acetaminophen 7.5-325 MG per tablet  Commonly known as: PERCOCET  Take 1 tablet by mouth Every 6 (Six) Hours As Needed for Moderate Pain .        Changed    montelukast 10 MG tablet  Commonly known as: SINGULAIR  Take 1 tablet by mouth Every Night.  What changed:   · when to take this  · reasons to take this           Where to Get Your Medications      These medications were sent to [id number] - [place], [place] - [address] [place] [address] AT [address] [facility] - [phone]  - [phone] FX  [address] [place] [id number]    Phone: [phone]   · oxyCODONE-acetaminophen 7.5-325 MG per tablet          Paolo Morales MD  10/23/21 8744

## 2021-10-25 ENCOUNTER — TELEPHONE (OUTPATIENT)
Dept: FAMILY MEDICINE CLINIC | Facility: CLINIC | Age: 56
End: 2021-10-25

## 2021-10-25 ENCOUNTER — OFFICE VISIT (OUTPATIENT)
Dept: FAMILY MEDICINE CLINIC | Facility: CLINIC | Age: 56
End: 2021-10-25

## 2021-10-25 VITALS
BODY MASS INDEX: 28.49 KG/M2 | RESPIRATION RATE: 16 BRPM | DIASTOLIC BLOOD PRESSURE: 80 MMHG | OXYGEN SATURATION: 98 % | HEART RATE: 83 BPM | HEIGHT: 68 IN | SYSTOLIC BLOOD PRESSURE: 138 MMHG | TEMPERATURE: 98.6 F | WEIGHT: 188 LBS

## 2021-10-25 DIAGNOSIS — S22.42XA CLOSED FRACTURE OF MULTIPLE RIBS OF LEFT SIDE, INITIAL ENCOUNTER: ICD-10-CM
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
## 2021-10-25 PROBLEM — S22.39XA RIB FRACTURE: Status: ACTIVE | Noted: 2021-10-25

## 2021-10-25 PROBLEM — M15.0 PRIMARY GENERALIZED (OSTEO)ARTHRITIS: Status: ACTIVE | Noted: 2017-01-08

## 2021-10-25 LAB
QT INTERVAL: 392 MS
QTC INTERVAL: 407 MS

## 2021-10-25 PROCEDURE — 99213 OFFICE O/P EST LOW 20 MIN: CPT | Performed by: FAMILY MEDICINE

## 2021-10-25 RX ORDER — OXYCODONE AND ACETAMINOPHEN 7.5; 325 MG/1; MG/1
1 TABLET ORAL EVERY 6 HOURS PRN
Qty: 30 TABLET | Refills: 0 | OUTPATIENT
Start: 2021-10-25 | End: 2022-02-03

## 2021-10-25 RX ORDER — CELECOXIB 200 MG/1
200 CAPSULE ORAL DAILY
Qty: 30 CAPSULE | Refills: 1 | Status: SHIPPED | OUTPATIENT
Start: 2021-10-25 | End: 2022-03-02

## 2021-10-25 NOTE — PROGRESS NOTES
Subjective   Selvin Garcia is a 55 y.o. male presenting with chief complaint of:   Chief Complaint   Patient presents with   • multiple rib fracture   • Shortness of Breath     Pt states it hurts to breath and it makes him not breath as often       History of Present Illness :  With wife.  Here for primarily an acute issue today; f/u  ER.  Contacted ro over weekend/on call with stumble on basement floor falling 6 steps to concrete/carpeted floor.  No LOC but L chest wall pain.  /ER found 3 rib fxs.  A lot of pain and only got #10 pain pills.       Has few chronic problems to consider that might have a bearing on today's issues; not an interval appointment.       Chronic/acute problems reviewed today:   1. Closed fracture of multiple ribs of left side, initial encounter    2. Fall, initial encounter      Has an/another acute issue today: none.    The following portions of the patient's history were reviewed and updated as appropriate: allergies, current medications, past family history, past medical history, past social history, past surgical history and problem list.      Current Outpatient Medications:   •  azelastine (OPTIVAR) 0.05 % ophthalmic solution, 1 drop 2 (Two) Times a Day., Disp: , Rfl:   •  cetirizine (zyrTEC) 10 MG tablet, TAKE 1 TABLET DAILY, Disp: 90 tablet, Rfl: 3  •  fluticasone (FLONASE) 50 MCG/ACT nasal spray, 2 sprays into the nostril(s) as directed by provider Daily., Disp: 16 g, Rfl: 11  •  ketorolac (ACULAR) 0.5 % ophthalmic solution, Apply 1 drop to eye(s) as directed by provider 4 (Four) Times a Day., Disp: 3 mL, Rfl: 0  •  meloxicam (MOBIC) 15 MG tablet, Take 15 mg by mouth Daily., Disp: , Rfl:   •  methylphenidate (Concerta) 27 MG CR tablet, Take 1 tablet by mouth Every Morning, Disp: 30 tablet, Rfl: 0  •  montelukast (SINGULAIR) 10 MG tablet, Take 1 tablet by mouth Every Night. (Patient taking differently: Take 10 mg by mouth At Night As Needed.), Disp: 90 tablet, Rfl: 3  •   oxyCODONE-acetaminophen (PERCOCET) 7.5-325 MG per tablet, Take 1 tablet by mouth Every 6 (Six) Hours As Needed for Moderate Pain ., Disp: 10 tablet, Rfl: 0  •  pantoprazole (PROTONIX) 40 MG EC tablet, Take 1 tablet by mouth Daily., Disp: 30 tablet, Rfl: 11  •  PROAIR  (90 Base) MCG/ACT inhaler, USE 2 INHALATIONS EVERY 4 HOURS AS NEEDED FOR WHEEZING, Disp: 25.5 g, Rfl: 0    No problems with medications.    No Known Allergies    Review of Systems   Constitutional: Positive for activity change, appetite change and fatigue. Negative for chills and fever.   Eyes: Negative for visual disturbance.   Respiratory: Negative for cough and shortness of breath.    Cardiovascular: Positive for chest pain. Negative for palpitations and leg swelling.   Gastrointestinal: Negative for abdominal pain, blood in stool, diarrhea and vomiting.   Genitourinary: Negative for difficulty urinating.   Neurological: Negative for dizziness and headaches.     Data reviewed:   Recent admit/ER/MD visits:  ER    Lab Results:  Results for orders placed or performed during the hospital encounter of 10/23/21   Comprehensive Metabolic Panel    Specimen: Blood   Result Value Ref Range    Glucose 116 (H) 65 - 99 mg/dL    BUN 17 6 - 20 mg/dL    Creatinine 0.84 0.76 - 1.27 mg/dL    Sodium 142 136 - 145 mmol/L    Potassium 4.2 3.5 - 5.2 mmol/L    Chloride 106 98 - 107 mmol/L    CO2 30.0 (H) 22.0 - 29.0 mmol/L    Calcium 8.9 8.6 - 10.5 mg/dL    Total Protein 6.6 6.0 - 8.5 g/dL    Albumin 4.10 3.50 - 5.20 g/dL    ALT (SGPT) 11 1 - 41 U/L    AST (SGOT) 18 1 - 40 U/L    Alkaline Phosphatase 77 39 - 117 U/L    Total Bilirubin 0.8 0.0 - 1.2 mg/dL    eGFR Non African Amer 95 >60 mL/min/1.73    Globulin 2.5 gm/dL    A/G Ratio 1.6 g/dL    BUN/Creatinine Ratio 20.2 7.0 - 25.0    Anion Gap 6.0 5.0 - 15.0 mmol/L   CBC Auto Differential    Specimen: Blood   Result Value Ref Range    WBC 8.42 3.40 - 10.80 10*3/mm3    RBC 5.32 4.14 - 5.80 10*6/mm3    Hemoglobin  15.8 13.0 - 17.7 g/dL    Hematocrit 45.4 37.5 - 51.0 %    MCV 85.3 79.0 - 97.0 fL    MCH 29.7 26.6 - 33.0 pg    MCHC 34.8 31.5 - 35.7 g/dL    RDW 12.8 12.3 - 15.4 %    RDW-SD 39.2 37.0 - 54.0 fl    MPV 9.2 6.0 - 12.0 fL    Platelets 287 140 - 450 10*3/mm3    Neutrophil % 71.9 42.7 - 76.0 %    Lymphocyte % 13.1 (L) 19.6 - 45.3 %    Monocyte % 8.0 5.0 - 12.0 %    Eosinophil % 5.7 0.3 - 6.2 %    Basophil % 0.8 0.0 - 1.5 %    Immature Grans % 0.5 0.0 - 0.5 %    Neutrophils, Absolute 6.06 1.70 - 7.00 10*3/mm3    Lymphocytes, Absolute 1.10 0.70 - 3.10 10*3/mm3    Monocytes, Absolute 0.67 0.10 - 0.90 10*3/mm3    Eosinophils, Absolute 0.48 (H) 0.00 - 0.40 10*3/mm3    Basophils, Absolute 0.07 0.00 - 0.20 10*3/mm3    Immature Grans, Absolute 0.04 0.00 - 0.05 10*3/mm3    nRBC 0.0 0.0 - 0.2 /100 WBC   ECG 12 Lead   Result Value Ref Range    QT Interval 392 ms    QTC Interval 407 ms       A1C:  Lab Results - Last 18 Months   Lab Units 08/11/21  0726 03/24/21  0722   HEMOGLOBIN A1C % 4.50* 4.70*     LIPID:  Lab Results - Last 18 Months   Lab Units 03/24/21  0722   CHOLESTEROL mg/dL 194   LDL CHOL mg/dL 129*   HDL CHOL mg/dL 56   TRIGLYCERIDES mg/dL 47     PSA:  Lab Results - Last 18 Months   Lab Units 03/24/21  0722   PSA ng/mL 0.761     CBC:  Lab Results - Last 18 Months   Lab Units 10/23/21  1313 08/11/21  0726 03/24/21  0722   WBC 10*3/mm3 8.42 5.49 7.21   HEMOGLOBIN g/dL 15.8 16.2 15.8   HEMATOCRIT % 45.4 49.2 46.1   PLATELETS 10*3/mm3 287 308 362      BMP/CMP:  Lab Results - Last 18 Months   Lab Units 10/23/21  1313 08/11/21  0726 03/24/21  0722   SODIUM mmol/L 142 140 140   POTASSIUM mmol/L 4.2 4.8 4.6   CHLORIDE mmol/L 106 105 106   TOTAL CO2 mmol/L  --  25.9 26.4   CO2 mmol/L 30.0*  --   --    GLUCOSE mg/dL  --  90 110*   BUN mg/dL 17 15 20   CREATININE mg/dL 0.84 0.98 0.94   EGFR IF NONAFRICN AM mL/min/1.73 95 79 83   EGFR IF AFRICN AM mL/min/1.73  --  96 101   CALCIUM mg/dL 8.9 9.5 9.1     HEPATIC:  Lab Results - Last  "18 Months   Lab Units 10/23/21  1313 08/11/21  0726 03/24/21  0722   ALT (SGPT) U/L 11 12 13   AST (SGOT) U/L 18 19 18   ALK PHOS U/L 77 79 73     THYROID:  Lab Results - Last 18 Months   Lab Units 03/24/21  0722   TSH uIU/mL 0.825     CT Head Without Contrast    Result Date: 10/23/2021   1.  No acute intracranial finding. 2.  Small LEFT frontal scalp contusion. 3.  Paranasal sinus and plantar change. This report was finalized on 10/23/2021 14:49 by Dr. Jeramy Aguilera MD.    CT Chest With Contrast Diagnostic    Result Date: 10/23/2021   1.  Acute nondisplaced fractures of the LEFT lateral fourth, fifth, and sixth ribs. 2.  No other acute traumatic finding. No pneumothorax. 3.  Mild dependent atelectasis in both lower lungs. This report was finalized on 10/23/2021 14:58 by Dr. Jeramy Aguilera MD.    CT Cervical Spine Without Contrast    Result Date: 10/23/2021   No acute fracture or subluxation. This report was finalized on 10/23/2021 14:55 by Dr. Jeramy Aguilera MD.    CT Abdomen Pelvis With Contrast    Result Date: 10/23/2021  1.  No acute finding in the abdomen or pelvis. 2.  Nondisplaced acute LEFT lateral fifth and sixth rib fractures. 3.  Nonobstructing punctate LEFT lower pole renal calculus. This report was finalized on 10/23/2021 15:04 by Dr. Jeramy Aguilera MD.    XR Chest 1 View    Result Date: 10/23/2021   No acute findings. This report was finalized on 10/23/2021 13:38 by Dr. Jreamy Aguilera MD.      Objective   /80   Pulse 83   Temp 98.6 °F (37 °C) (Infrared)   Resp 16   Ht 172.7 cm (68\")   Wt 85.3 kg (188 lb)   SpO2 98%   BMI 28.59 kg/m²   Body mass index is 28.59 kg/m².    Recent Vitals       10/23/2021 10/23/2021 10/25/2021       BP: -- 118/78 138/80     Pulse: -- 71 83     Temp: 98 °F (36.7 °C) 98.3 °F (36.8 °C) 98.6 °F (37 °C)     Weight: -- -- 85.3 kg (188 lb)     BMI (Calculated): -- -- 28.6           Physical Exam  Vitals reviewed.   Constitutional:       General: He is not in acute " distress.  HENT:      Head: Normocephalic.      Mouth/Throat:      Mouth: Mucous membranes are moist.   Eyes:      Extraocular Movements: Extraocular movements intact.      Conjunctiva/sclera: Conjunctivae normal.      Pupils: Pupils are equal, round, and reactive to light.   Cardiovascular:      Rate and Rhythm: Normal rate and regular rhythm.      Heart sounds: Normal heart sounds.   Pulmonary:      Effort: Pulmonary effort is normal.      Breath sounds: Normal breath sounds.   Chest:      Chest wall: Tenderness (L chest wall) present.   Abdominal:      Palpations: Abdomen is soft.      Tenderness: There is no abdominal tenderness.   Musculoskeletal:      Cervical back: Neck supple. No tenderness.   Skin:     General: Skin is warm and dry.   Neurological:      General: No focal deficit present.      Mental Status: He is alert and oriented to person, place, and time.         Assessment/Plan     1. Closed fracture of multiple ribs of left side, initial encounter    2. Fall, initial encounter        Discussion:  Did not suggest rib belt  Report significant SOB   Pace activity; nothing that causes pain now  No work; guess 4-6w    Vaccine reviewed: today none; later  covid booster, seasonal flu    Medical decision issues:   Data review above:   Rx: reviewed and decisions:   Same Rx for now     Visit today involved chronic significant medical problems or differentials and/or intensive drug monitoring: ie potential to cause serious morbidity.  New Medications Ordered This Visit   Medications   • celecoxib (CeleBREX) 200 MG capsule     Sig: Take 1 capsule by mouth Daily.     Dispense:  30 capsule     Refill:  1   • oxyCODONE-acetaminophen (PERCOCET) 7.5-325 MG per tablet     Sig: Take 1 tablet by mouth Every 6 (Six) Hours As Needed for Moderate Pain .     Dispense:  30 tablet     Refill:  0       Orders placed:   LAB/Testing/Referrals: reviewed/orders:   Today:   No orders of the defined types were placed in this  encounter.    Chronic/recurrent labs above or change to:   none    Health maintenance:   Body mass index is 28.59 kg/m².  Patient's Body mass index is 28.59 kg/m². indicating that he is overweight (BMI 25-29.9). Obesity-related health conditions include the following: diabetes mellitus. Obesity is unchanged. BMI is is above average; BMI management plan is completed. We discussed portion control and increasing exercise..      Tobacco use reviewed:   Selvin Garcia  reports that he has never smoked. He has never used smokeless tobacco..    There are no Patient Instructions on file for this visit.    Follow up: Return for dr pedersen 3w.  Future Appointments   Date Time Provider Department Center   11/16/2021  9:45 AM Aquilino Pedersen MD [facility]

## 2021-10-25 NOTE — TELEPHONE ENCOUNTER
Wife contacted ro over weekend; fell down stairs with pains  Advised BH/ED.     Patient contacted ro this AM; 3 fractured ribs and wants apt  Apt with Dr Pedersen today

## 2021-11-01 ENCOUNTER — FLU SHOT (OUTPATIENT)
Dept: FAMILY MEDICINE CLINIC | Facility: CLINIC | Age: 56
End: 2021-11-01

## 2021-11-01 DIAGNOSIS — Z23 NEED FOR INFLUENZA VACCINATION: Primary | ICD-10-CM

## 2021-11-01 PROCEDURE — 90686 IIV4 VACC NO PRSV 0.5 ML IM: CPT | Performed by: FAMILY MEDICINE

## 2021-11-01 PROCEDURE — 90471 IMMUNIZATION ADMIN: CPT | Performed by: FAMILY MEDICINE

## 2021-11-12 ENCOUNTER — PATIENT MESSAGE (OUTPATIENT)
Dept: FAMILY MEDICINE CLINIC | Facility: CLINIC | Age: 56
End: 2021-11-12

## 2021-11-12 DIAGNOSIS — F98.8 ATTENTION DEFICIT DISORDER, UNSPECIFIED HYPERACTIVITY PRESENCE: ICD-10-CM

## 2021-11-12 RX ORDER — METHYLPHENIDATE HYDROCHLORIDE 27 MG/1
27 TABLET ORAL EVERY MORNING
Qty: 30 TABLET | Refills: 0 | Status: SHIPPED | OUTPATIENT
Start: 2021-11-12 | End: 2022-01-11 | Stop reason: SDUPTHER

## 2021-12-01 DIAGNOSIS — Z71.84 TRAVEL ADVICE ENCOUNTER: Primary | ICD-10-CM

## 2022-01-10 ENCOUNTER — PATIENT MESSAGE (OUTPATIENT)
Dept: FAMILY MEDICINE CLINIC | Facility: CLINIC | Age: 57
End: 2022-01-10

## 2022-01-10 ENCOUNTER — TELEPHONE (OUTPATIENT)
Dept: FAMILY MEDICINE CLINIC | Facility: CLINIC | Age: 57
End: 2022-01-10

## 2022-01-10 DIAGNOSIS — R05.9 COUGH: Primary | ICD-10-CM

## 2022-01-10 DIAGNOSIS — F98.8 ATTENTION DEFICIT DISORDER, UNSPECIFIED HYPERACTIVITY PRESENCE: ICD-10-CM

## 2022-01-10 NOTE — TELEPHONE ENCOUNTER
Symptoms of covid    Told to go to [facility] drive through after 9AM  ([place]; send orders to [facility])

## 2022-01-11 RX ORDER — METHYLPHENIDATE HYDROCHLORIDE 27 MG/1
27 TABLET ORAL EVERY MORNING
Qty: 30 TABLET | Refills: 0 | Status: SHIPPED | OUTPATIENT
Start: 2022-01-11 | End: 2022-02-18 | Stop reason: DRUGHIGH

## 2022-01-11 NOTE — TELEPHONE ENCOUNTER
From: Selvin Garcia  To: Aquilino Pedersen MD  Sent: 1/10/2022 2:34 PM CST  Subject: Concerta refill    Aquilino my concerta refill is needing refilled please.

## 2022-01-11 NOTE — TELEPHONE ENCOUNTER
Last filled 12/12/2021 per callie      Rx Refill Note  Requested Prescriptions     Pending Prescriptions Disp Refills   • methylphenidate (Concerta) 27 MG CR tablet 30 tablet 0     Sig: Take 1 tablet by mouth Every Morning      Last office visit with prescribing clinician: 10/25/2021      Next office visit with prescribing clinician: Visit date not found            Tina Laureano MA  01/11/22, 08:37 CST

## 2022-02-03 ENCOUNTER — APPOINTMENT (OUTPATIENT)
Dept: GENERAL RADIOLOGY | Facility: HOSPITAL | Age: 57
End: 2022-02-03

## 2022-02-03 ENCOUNTER — HOSPITAL ENCOUNTER (EMERGENCY)
Facility: HOSPITAL | Age: 57
Discharge: HOME | End: 2022-02-03
Admitting: EMERGENCY MEDICINE

## 2022-02-03 ENCOUNTER — APPOINTMENT (OUTPATIENT)
Dept: CT IMAGING | Facility: HOSPITAL | Age: 57
End: 2022-02-03

## 2022-02-03 VITALS
RESPIRATION RATE: 16 BRPM | OXYGEN SATURATION: 97 % | WEIGHT: 166 LBS | BODY MASS INDEX: 25.16 KG/M2 | HEIGHT: 68 IN | HEART RATE: 74 BPM | DIASTOLIC BLOOD PRESSURE: 71 MMHG | SYSTOLIC BLOOD PRESSURE: 122 MMHG | TEMPERATURE: 98.5 F

## 2022-02-03 DIAGNOSIS — S16.1XXA STRAIN OF NECK MUSCLE, INITIAL ENCOUNTER: ICD-10-CM

## 2022-02-03 DIAGNOSIS — S39.012A STRAIN OF LUMBAR REGION, INITIAL ENCOUNTER: ICD-10-CM
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
## 2022-02-03 DIAGNOSIS — S30.0XXA CONTUSION OF COCCYX, INITIAL ENCOUNTER: ICD-10-CM

## 2022-02-03 PROCEDURE — 63710000001 ONDANSETRON ODT 4 MG TABLET DISPERSIBLE: Performed by: NURSE PRACTITIONER

## 2022-02-03 PROCEDURE — 99283 EMERGENCY DEPT VISIT LOW MDM: CPT

## 2022-02-03 PROCEDURE — 25010000002 KETOROLAC TROMETHAMINE PER 15 MG: Performed by: NURSE PRACTITIONER

## 2022-02-03 PROCEDURE — 72110 X-RAY EXAM L-2 SPINE 4/>VWS: CPT

## 2022-02-03 PROCEDURE — 96372 THER/PROPH/DIAG INJ SC/IM: CPT

## 2022-02-03 PROCEDURE — 25010000002 FENTANYL CITRATE (PF) 50 MCG/ML SOLUTION: Performed by: NURSE PRACTITIONER

## 2022-02-03 PROCEDURE — 0 HYDROMORPHONE 1 MG/ML SOLUTION: Performed by: NURSE PRACTITIONER

## 2022-02-03 PROCEDURE — 72072 X-RAY EXAM THORAC SPINE 3VWS: CPT

## 2022-02-03 PROCEDURE — 72125 CT NECK SPINE W/O DYE: CPT

## 2022-02-03 PROCEDURE — 70450 CT HEAD/BRAIN W/O DYE: CPT

## 2022-02-03 PROCEDURE — 25010000002 ORPHENADRINE CITRATE PER 60 MG: Performed by: NURSE PRACTITIONER

## 2022-02-03 PROCEDURE — 72220 X-RAY EXAM SACRUM TAILBONE: CPT

## 2022-02-03 RX ORDER — KETOROLAC TROMETHAMINE 30 MG/ML
30 INJECTION, SOLUTION INTRAMUSCULAR; INTRAVENOUS ONCE
Status: DISCONTINUED | OUTPATIENT
Start: 2022-02-03 | End: 2022-02-03

## 2022-02-03 RX ORDER — HYDROCODONE BITARTRATE AND ACETAMINOPHEN 7.5; 325 MG/1; MG/1
1 TABLET ORAL EVERY 4 HOURS PRN
Qty: 15 TABLET | Refills: 0 | Status: SHIPPED | OUTPATIENT
Start: 2022-02-03 | End: 2022-02-03 | Stop reason: SDUPTHER

## 2022-02-03 RX ORDER — ONDANSETRON 4 MG/1
4 TABLET, ORALLY DISINTEGRATING ORAL ONCE
Status: COMPLETED | OUTPATIENT
Start: 2022-02-03 | End: 2022-02-03

## 2022-02-03 RX ORDER — FENTANYL CITRATE 50 UG/ML
50 INJECTION, SOLUTION INTRAMUSCULAR; INTRAVENOUS ONCE
Status: DISCONTINUED | OUTPATIENT
Start: 2022-02-03 | End: 2022-02-03

## 2022-02-03 RX ORDER — ONDANSETRON 4 MG/1
4 TABLET, ORALLY DISINTEGRATING ORAL EVERY 6 HOURS PRN
Qty: 12 TABLET | Refills: 0 | Status: SHIPPED | OUTPATIENT
Start: 2022-02-03 | End: 2022-02-03 | Stop reason: SDUPTHER

## 2022-02-03 RX ORDER — ONDANSETRON 4 MG/1
4 TABLET, ORALLY DISINTEGRATING ORAL EVERY 6 HOURS PRN
Qty: 12 TABLET | Refills: 0 | Status: SHIPPED | OUTPATIENT
Start: 2022-02-03 | End: 2022-03-02

## 2022-02-03 RX ORDER — ORPHENADRINE CITRATE 30 MG/ML
60 INJECTION INTRAMUSCULAR; INTRAVENOUS ONCE
Status: COMPLETED | OUTPATIENT
Start: 2022-02-03 | End: 2022-02-03

## 2022-02-03 RX ORDER — CYCLOBENZAPRINE HCL 10 MG
10 TABLET ORAL 3 TIMES DAILY PRN
Qty: 15 TABLET | Refills: 0 | Status: SHIPPED | OUTPATIENT
Start: 2022-02-03 | End: 2022-03-02

## 2022-02-03 RX ORDER — FENTANYL CITRATE 50 UG/ML
50 INJECTION, SOLUTION INTRAMUSCULAR; INTRAVENOUS ONCE
Status: COMPLETED | OUTPATIENT
Start: 2022-02-03 | End: 2022-02-03

## 2022-02-03 RX ORDER — KETOROLAC TROMETHAMINE 30 MG/ML
30 INJECTION, SOLUTION INTRAMUSCULAR; INTRAVENOUS ONCE
Status: COMPLETED | OUTPATIENT
Start: 2022-02-03 | End: 2022-02-03

## 2022-02-03 RX ORDER — CYCLOBENZAPRINE HCL 10 MG
10 TABLET ORAL 3 TIMES DAILY PRN
Qty: 15 TABLET | Refills: 0 | Status: SHIPPED | OUTPATIENT
Start: 2022-02-03 | End: 2022-02-03 | Stop reason: SDUPTHER

## 2022-02-03 RX ORDER — HYDROCODONE BITARTRATE AND ACETAMINOPHEN 7.5; 325 MG/1; MG/1
1 TABLET ORAL EVERY 4 HOURS PRN
Qty: 15 TABLET | Refills: 0 | Status: SHIPPED | OUTPATIENT
Start: 2022-02-03 | End: 2022-03-02

## 2022-02-03 RX ADMIN — ORPHENADRINE CITRATE 60 MG: 30 INJECTION INTRAMUSCULAR; INTRAVENOUS at 16:07

## 2022-02-03 RX ADMIN — ONDANSETRON 4 MG: 4 TABLET, ORALLY DISINTEGRATING ORAL at 16:01

## 2022-02-03 RX ADMIN — FENTANYL CITRATE 50 MCG: 50 INJECTION, SOLUTION INTRAMUSCULAR; INTRAVENOUS at 17:14

## 2022-02-03 RX ADMIN — HYDROMORPHONE HYDROCHLORIDE 1 MG: 1 INJECTION, SOLUTION INTRAMUSCULAR; INTRAVENOUS; SUBCUTANEOUS at 16:06

## 2022-02-03 RX ADMIN — KETOROLAC TROMETHAMINE 30 MG: 30 INJECTION, SOLUTION INTRAMUSCULAR; INTRAVENOUS at 17:16

## 2022-02-03 NOTE — ED PROVIDER NOTES
Subjective   Patient is a 56-year-old male presents the ER secondary to a fall.  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  He states when he arrived at work he was stepping out of the truck.  He assumes there was either sleet on his shoes or on the steps of his truck.  He states as soon as he hit the first step it caused him to slip and fall.  He states his legs likely came up over his head.  He reports landing straight on his buttock.  He reports hitting the back of his head on the step of his truck.  He denies loss of consciousness however reports a near syncopal episode.  He reports having shooting pain up his back.  He denies any loss of bowel or bladder control or saddle anesthesia.  Due to symptoms described he came to the ER for evaluation and treatment.  Patient tells me he just got out of quarantine about a week ago for Covid.  He denies any new cough congestion, fevers, or other associated symptoms or modifying factors.          Review of Systems   Constitutional: Negative.    HENT: Negative.    Eyes: Negative.    Respiratory: Negative.  Negative for shortness of breath.    Cardiovascular: Negative.  Negative for chest pain.   Gastrointestinal: Negative.  Negative for abdominal pain, diarrhea, nausea and vomiting.   Genitourinary: Negative.    Musculoskeletal: Positive for back pain and neck pain.   Skin: Negative for wound.   Neurological: Negative.  Negative for weakness and numbness.   All other systems reviewed and are negative.      Past Medical History:   Diagnosis Date   • Abnormal CT of paranasal sinuses 08/20/2015    (Note 1 of 3)  NASAL SEPTUM:  The nasal septum is relatively midline.  MAXILLARY SINUSES:  The left maxillary sinus showed >5 m mucosal thickening and 50% opacification and the right maxillary sinus showed >5 mm mucosal thickening. The osteomeatal complex is obstructed bilaterally.   • Abnormal CT of paranasal sinuses 08/20/2015     (Note 2 of 3)  NASAL TURBINATES: The inferior turbinates showed bilateral hypertrophy. The Cherie bullosa are not present.  ETHMOID SINUSES: The left ethmoid sinus showed >3 mm mucosal thickening and complete opacification. The lamina papyracea appears intact bilaterally.   • Abnormal CT of paranasal sinuses 08/20/2015    (Note 3 of 3)  FRONTAL SINUSES:  The left frontal sinus showed complete opacification. The right frontal sinus showed complete opacification. SPENOID SINUSES: The left sphenoid sinus showed moderate mucosal thickening. The right sphenoid sinus showed moderate mucosal thickening.   • Allergic rhinitis 12/01/2015   • Chronic rhinitis    • Chronic sinusitis    • Disturbances of sensation of smell and taste 12/01/2015   • Diverticulosis    • History of adenomatous polyp of colon    • History of colon polyps    • Sensorineural hearing loss, bilateral 12/01/2015   • Subjective tinnitus 12/01/2015       No Known Allergies    Past Surgical History:   Procedure Laterality Date   • COLONOSCOPY  12/09/2013    Dr. Chavez-Polyp; Repeat 5 years (Abstracted from PCP's note)   • COLONOSCOPY N/A 9/24/2021    One 6mm tubular adenomatous polyp in the ascending colon-Resected and retrieved-Clip (MR conditional) was placed; Diverticulosis in the left colon; Non-bleeding internal hemorrhoids; Repeat 5 years    • ENDOSCOPY  08/14/2015    Dr. Mullins-Gastric ulcer; Repeat 8 weeks (Abstracted from PCP's note)   • ENDOSCOPY  10/16/2015    Dr. Mullins-Dr. Mullins (Abstracted from PCP's note)   • ENDOSCOPY N/A 10/15/2021    Procedure: ESOPHAGOGASTRODUODENOSCOPY WITH ANESTHESIA;  Surgeon: Isaura Fragoso MD;  Location: [facility] ENDOSCOPY;  Service: Gastroenterology;  Laterality: N/A;  pre screen  post balloon  Dr. Pedersen   • FRACTURE SURGERY     • FUNCTIONAL ENDOSCOPIC SINUS SURGERY  07/26/2016   • HEMORRHOIDECTOMY     • KNEE ARTHROPLASTY, PARTIAL REPLACEMENT Left    • KNEE MENISCECTOMY Right     X 2   • OTHER SURGICAL HISTORY       Open Reduction-Internal Fixation   • ROTATOR CUFF REPAIR Right    • SEPTOPLASTY      Per Dr. Urrutia 7-8 years ago       Family History   Problem Relation Age of Onset   • Diabetes Other    • No Known Problems Mother    • No Known Problems Father    • Colon polyps Neg Hx    • Colon cancer Neg Hx    • Esophageal cancer Neg Hx    • Liver cancer Neg Hx    • Liver disease Neg Hx    • Rectal cancer Neg Hx    • Stomach cancer Neg Hx        Social History     Socioeconomic History   • Marital status:      Spouse name: Kelsy ()   • Number of children: 2   Tobacco Use   • Smoking status: Never Smoker   • Smokeless tobacco: Never Used   Vaping Use   • Vaping Use: Never used   Substance and Sexual Activity   • Alcohol use: Yes     Comment: Occasionally    • Drug use: Never   • Sexual activity: Defer           Objective   Physical Exam  Vitals and nursing note reviewed.   Constitutional:       General: He is not in acute distress.     Appearance: Normal appearance. He is well-developed. He is not diaphoretic.   HENT:      Head: Normocephalic and atraumatic.      Right Ear: External ear normal.      Left Ear: External ear normal.      Nose: Nose normal.      Mouth/Throat:      Mouth: Mucous membranes are moist.      Pharynx: Oropharynx is clear.   Eyes:      General: No scleral icterus.     Extraocular Movements: Extraocular movements intact.      Conjunctiva/sclera: Conjunctivae normal.   Neck:      Thyroid: No thyromegaly.      Vascular: No JVD.      Comments: Pain to palpation the cervical spine without step-off or vertebral point tenderness identified, neurovascular tact, sensory intact, cervical collar in place  Cardiovascular:      Rate and Rhythm: Normal rate and regular rhythm.      Heart sounds: Normal heart sounds. No murmur heard.      Pulmonary:      Effort: Pulmonary effort is normal. No respiratory distress.      Breath sounds: Normal breath sounds. No wheezing or rales.   Chest:      Chest  wall: No tenderness.   Abdominal:      General: Bowel sounds are normal. There is no distension.      Palpations: Abdomen is soft. There is no mass.      Tenderness: There is no abdominal tenderness. There is no guarding or rebound.   Musculoskeletal:      Cervical back: Neck supple. Tenderness present.      Comments: Pain to palpation the lumbar spine without step-off or vertebral point tenderness identified, neurovascular tact, sensory intact   Lymphadenopathy:      Cervical: No cervical adenopathy.   Skin:     General: Skin is warm and dry.      Capillary Refill: Capillary refill takes less than 2 seconds.      Coloration: Skin is not pale.      Findings: No erythema or rash.   Neurological:      General: No focal deficit present.      Mental Status: He is alert and oriented to person, place, and time.      Cranial Nerves: No cranial nerve deficit.      Coordination: Coordination normal.      Deep Tendon Reflexes: Reflexes are normal and symmetric.   Psychiatric:         Mood and Affect: Mood normal.         Behavior: Behavior normal.         Thought Content: Thought content normal.         Judgment: Judgment normal.         Procedures           ED Course we recommend to f/u with pcp for re-evaluation, avoid heavy lifting greater than 10 lbs x 1 week, warm moist heat to the areas, maintain fall precautions, avoid any forceful bending or straining, return with any worsening sxs.  ED Course as of 02/03/22 1736   Thu Feb 03, 2022 1700   IMPRESSION:  1. Negative sacrum and coccyx   [TW]   1700    IMPRESSION:  1. Normal radiographs of the lumbar spine.    [TW]   1700   IMPRESSION:  1. Normal radiographs of the thoracic spine.    [TW]   1700 IMPRESSION:  1. No evidence of acute osseous injury or malalignment in the cervical  spine [TW]   1701 IMPRESSION:  1. No acute intracranial process.  2. Sinusitis    [TW]   1703 Changed medications to IM [TW]      ED Course User Index  [TW] Laura Feliz, APRN                                                  MDM  Number of Diagnoses or Management Options  Contusion of coccyx, initial encounter: new and requires workup  Fall, initial encounter: new and requires workup  Strain of lumbar region, initial encounter: new and requires workup  Strain of neck muscle, initial encounter: new and requires workup     Amount and/or Complexity of Data Reviewed  Tests in the radiology section of CPT®: ordered and reviewed  Discuss the patient with other providers: yes    Risk of Complications, Morbidity, and/or Mortality  Presenting problems: moderate  Diagnostic procedures: moderate  Management options: moderate    Patient Progress  Patient progress: improved      Final diagnoses:   Fall, initial encounter   Contusion of coccyx, initial encounter   Strain of lumbar region, initial encounter   Strain of neck muscle, initial encounter       ED Disposition  ED Disposition     ED Disposition Condition Comment    Discharge Good           No follow-up provider specified.       Medication List      New Prescriptions    cyclobenzaprine 10 MG tablet  Commonly known as: FLEXERIL  Take 1 tablet by mouth 3 (Three) Times a Day As Needed for Muscle Spasms.     HYDROcodone-acetaminophen 7.5-325 MG per tablet  Commonly known as: NORCO  Take 1 tablet by mouth Every 4 (Four) Hours As Needed for Moderate Pain .     ondansetron ODT 4 MG disintegrating tablet  Commonly known as: ZOFRAN-ODT  Place 1 tablet on the tongue Every 6 (Six) Hours As Needed for Nausea.        Changed    montelukast 10 MG tablet  Commonly known as: SINGULAIR  Take 1 tablet by mouth Every Night.  What changed:   · when to take this  · reasons to take this        Stop    oxyCODONE-acetaminophen 7.5-325 MG per tablet  Commonly known as: PERCOCET           Where to Get Your Medications      These medications were sent to [id number] [place] [facility] [address] AT [facility] [id number]  [phone] [facility] [phone]  [place]  [address], [facility] [address]    Phone: [phone]   · cyclobenzaprine 10 MG tablet  · HYDROcodone-acetaminophen 7.5-325 MG per tablet  · ondansetron ODT 4 MG disintegrating tablet          Laura Feliz, APRN  02/03/22 1730

## 2022-02-03 NOTE — DISCHARGE INSTRUCTIONS
Warm moist heat to the areas; maintain fall precautions; avoid any heavy lifting greater than 10 lbs x 1 week; avoid any forceful bending or straining; f/u with pcp for re-evaluation

## 2022-02-10 ENCOUNTER — OFFICE VISIT (OUTPATIENT)
Dept: FAMILY MEDICINE CLINIC | Facility: CLINIC | Age: 57
End: 2022-02-10

## 2022-02-10 VITALS
HEIGHT: 68 IN | WEIGHT: 184 LBS | SYSTOLIC BLOOD PRESSURE: 118 MMHG | HEART RATE: 80 BPM | RESPIRATION RATE: 16 BRPM | BODY MASS INDEX: 27.89 KG/M2 | OXYGEN SATURATION: 98 % | DIASTOLIC BLOOD PRESSURE: 70 MMHG | TEMPERATURE: 97.1 F

## 2022-02-10 DIAGNOSIS — S20.229D CONTUSION OF BACK, UNSPECIFIED LATERALITY, SUBSEQUENT ENCOUNTER: ICD-10-CM

## 2022-02-10 DIAGNOSIS — M53.3 SACRAL PAIN: ICD-10-CM

## 2022-02-10 DIAGNOSIS — S00.93XD CONTUSION OF HEAD, UNSPECIFIED PART OF HEAD, SUBSEQUENT ENCOUNTER: ICD-10-CM
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
## 2022-02-10 PROCEDURE — 99213 OFFICE O/P EST LOW 20 MIN: CPT | Performed by: FAMILY MEDICINE

## 2022-02-10 RX ORDER — CETIRIZINE HYDROCHLORIDE 10 MG/1
10 TABLET ORAL DAILY
Qty: 90 TABLET | Refills: 3 | Status: SHIPPED | OUTPATIENT
Start: 2022-02-10 | End: 2022-04-14 | Stop reason: SDUPTHER

## 2022-02-10 NOTE — PROGRESS NOTES
Subjective   Selvin Garcia is a 56 y.o. male presenting with chief complaint of:   Chief Complaint   Patient presents with   • Fall     fell on ice, went to ED 2/3/2022       History of Present Illness :  Alone.  Here for primarily an acute issue of work injury/fell with contusion lower back and back of head.       /ER 2.3.22.     Patient is a 56-year-old male presents the ER secondary to a fall.  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  He states when he arrived at work he was stepping out of the truck.  He assumes there was either sleet on his shoes or on the steps of his truck.  He states as soon as he hit the first step it caused him to slip and fall.  He states his legs likely came up over his head.  He reports landing straight on his buttock.  He reports hitting the back of his head on the step of his truck.  He denies loss of consciousness however reports a near syncopal episode.  He reports having shooting pain up his back.  He denies any loss of bowel or bladder control or saddle anesthesia.  Due to symptoms described he came to the ER for evaluation and treatment.  Patient tells me he just got out of quarantine about a week ago for Covid.  He denies any new cough congestion, fevers, or other associated symptoms or modifying factors.    Has few chronic problems to consider that might have a bearing on today's issues; not an interval appointment.       Chronic/acute problems reviewed today:   1. Fall, subsequent encounter    2. Work related injury    3. Contusion of head, unspecified part of head, subsequent encounter    4. Contusion of back, unspecified laterality, subsequent encounter    5. Sacral pain      Has an/another acute issue today: none.    The following portions of the patient's history were reviewed and updated as appropriate: allergies, current medications, past family history, past medical history, past social history, past surgical  history and problem list.      Current Outpatient Medications:   •  azelastine (OPTIVAR) 0.05 % ophthalmic solution, 1 drop 2 (Two) Times a Day., Disp: , Rfl:   •  celecoxib (CeleBREX) 200 MG capsule, Take 1 capsule by mouth Daily., Disp: 30 capsule, Rfl: 1  •  cetirizine (zyrTEC) 10 MG tablet, TAKE 1 TABLET DAILY, Disp: 90 tablet, Rfl: 3  •  cyclobenzaprine (FLEXERIL) 10 MG tablet, Take 1 tablet by mouth 3 (Three) Times a Day As Needed for Muscle Spasms., Disp: 15 tablet, Rfl: 0  •  fluticasone (FLONASE) 50 MCG/ACT nasal spray, 2 sprays into the nostril(s) as directed by provider Daily., Disp: 16 g, Rfl: 11  •  HYDROcodone-acetaminophen (NORCO) 7.5-325 MG per tablet, Take 1 tablet by mouth Every 4 (Four) Hours As Needed for Moderate Pain ., Disp: 15 tablet, Rfl: 0  •  ketorolac (ACULAR) 0.5 % ophthalmic solution, Apply 1 drop to eye(s) as directed by provider 4 (Four) Times a Day., Disp: 3 mL, Rfl: 0  •  methylphenidate (Concerta) 27 MG CR tablet, Take 1 tablet by mouth Every Morning, Disp: 30 tablet, Rfl: 0  •  montelukast (SINGULAIR) 10 MG tablet, Take 1 tablet by mouth Every Night. (Patient taking differently: Take 10 mg by mouth At Night As Needed.), Disp: 90 tablet, Rfl: 3  •  ondansetron ODT (ZOFRAN-ODT) 4 MG disintegrating tablet, Place 1 tablet on the tongue Every 6 (Six) Hours As Needed for Nausea., Disp: 12 tablet, Rfl: 0  •  pantoprazole (PROTONIX) 40 MG EC tablet, Take 1 tablet by mouth Daily., Disp: 30 tablet, Rfl: 11  •  PROAIR  (90 Base) MCG/ACT inhaler, USE 2 INHALATIONS EVERY 4 HOURS AS NEEDED FOR WHEEZING, Disp: 25.5 g, Rfl: 0    No problems with medications.    No Known Allergies    Review of Systems   Constitutional: Positive for activity change. Negative for appetite change.   Respiratory: Negative for cough and shortness of breath.    Cardiovascular: Negative for chest pain and leg swelling.   Gastrointestinal: Negative for abdominal pain.   Genitourinary: Negative for difficulty  urinating.   Musculoskeletal: Positive for back pain (sacral area ).   Skin: Negative for wound.   Neurological: Positive for headaches (mildly sore occipital area). Negative for speech difficulty and weakness.   Psychiatric/Behavioral: Negative for confusion.     Data reviewed:   Recent admit/ER/MD visits: ER visit above  Last cardiac testing:   Echo: NA    Lab Results:  Results for orders placed or performed during the hospital encounter of 10/23/21   Comprehensive Metabolic Panel    Specimen: Blood   Result Value Ref Range    Glucose 116 (H) 65 - 99 mg/dL    BUN 17 6 - 20 mg/dL    Creatinine 0.84 0.76 - 1.27 mg/dL    Sodium 142 136 - 145 mmol/L    Potassium 4.2 3.5 - 5.2 mmol/L    Chloride 106 98 - 107 mmol/L    CO2 30.0 (H) 22.0 - 29.0 mmol/L    Calcium 8.9 8.6 - 10.5 mg/dL    Total Protein 6.6 6.0 - 8.5 g/dL    Albumin 4.10 3.50 - 5.20 g/dL    ALT (SGPT) 11 1 - 41 U/L    AST (SGOT) 18 1 - 40 U/L    Alkaline Phosphatase 77 39 - 117 U/L    Total Bilirubin 0.8 0.0 - 1.2 mg/dL    eGFR Non African Amer 95 >60 mL/min/1.73    Globulin 2.5 gm/dL    A/G Ratio 1.6 g/dL    BUN/Creatinine Ratio 20.2 7.0 - 25.0    Anion Gap 6.0 5.0 - 15.0 mmol/L   CBC Auto Differential    Specimen: Blood   Result Value Ref Range    WBC 8.42 3.40 - 10.80 10*3/mm3    RBC 5.32 4.14 - 5.80 10*6/mm3    Hemoglobin 15.8 13.0 - 17.7 g/dL    Hematocrit 45.4 37.5 - 51.0 %    MCV 85.3 79.0 - 97.0 fL    MCH 29.7 26.6 - 33.0 pg    MCHC 34.8 31.5 - 35.7 g/dL    RDW 12.8 12.3 - 15.4 %    RDW-SD 39.2 37.0 - 54.0 fl    MPV 9.2 6.0 - 12.0 fL    Platelets 287 140 - 450 10*3/mm3    Neutrophil % 71.9 42.7 - 76.0 %    Lymphocyte % 13.1 (L) 19.6 - 45.3 %    Monocyte % 8.0 5.0 - 12.0 %    Eosinophil % 5.7 0.3 - 6.2 %    Basophil % 0.8 0.0 - 1.5 %    Immature Grans % 0.5 0.0 - 0.5 %    Neutrophils, Absolute 6.06 1.70 - 7.00 10*3/mm3    Lymphocytes, Absolute 1.10 0.70 - 3.10 10*3/mm3    Monocytes, Absolute 0.67 0.10 - 0.90 10*3/mm3    Eosinophils, Absolute 0.48 (H)  0.00 - 0.40 10*3/mm3    Basophils, Absolute 0.07 0.00 - 0.20 10*3/mm3    Immature Grans, Absolute 0.04 0.00 - 0.05 10*3/mm3    nRBC 0.0 0.0 - 0.2 /100 WBC   ECG 12 Lead   Result Value Ref Range    QT Interval 392 ms    QTC Interval 407 ms       A1C:  Lab Results - Last 18 Months   Lab Units 08/11/21  0726 03/24/21 0722   HEMOGLOBIN A1C % 4.50* 4.70*     GLUCOSE:  Lab Results - Last 18 Months   Lab Units 10/23/21  1313 08/11/21  0726 03/24/21  0722   GLUCOSE mg/dL 116* 90 110*     LIPID:  Lab Results - Last 18 Months   Lab Units 03/24/21 0722   CHOLESTEROL mg/dL 194   LDL CHOL mg/dL 129*   HDL CHOL mg/dL 56   TRIGLYCERIDES mg/dL 47     PSA:  Lab Results - Last 18 Months   Lab Units 03/24/21 0722   PSA ng/mL 0.761     CBC:  Lab Results - Last 18 Months   Lab Units 10/23/21  1313 08/11/21  0726 03/24/21 0722   WBC 10*3/mm3 8.42 5.49 7.21   HEMOGLOBIN g/dL 15.8 16.2 15.8   HEMATOCRIT % 45.4 49.2 46.1   PLATELETS 10*3/mm3 287 308 362      BMP/CMP:  Lab Results - Last 18 Months   Lab Units 10/23/21  1313 08/11/21 0726 03/24/21 0722   SODIUM mmol/L 142 140 140   POTASSIUM mmol/L 4.2 4.8 4.6   CHLORIDE mmol/L 106 105 106   TOTAL CO2 mmol/L  --  25.9 26.4   CO2 mmol/L 30.0*  --   --    GLUCOSE mg/dL 116* 90 110*   BUN mg/dL 17 15 20   CREATININE mg/dL 0.84 0.98 0.94   EGFR IF NONAFRICN AM mL/min/1.73 95 79 83   EGFR IF AFRICN AM mL/min/1.73  --  96 101   CALCIUM mg/dL 8.9 9.5 9.1     HEPATIC:  Lab Results - Last 18 Months   Lab Units 10/23/21  1313 08/11/21 0726 03/24/21 0722   ALT (SGPT) U/L 11 12 13   AST (SGOT) U/L 18 19 18   ALK PHOS U/L 77 79 73     THYROID:  Lab Results - Last 18 Months   Lab Units 03/24/21  0722   TSH uIU/mL 0.825     XR Spine Thoracic 3 View    Result Date: 2/3/2022  1. Normal radiographs of the thoracic spine.  This report was finalized on 02/03/2022 16:12 by Dr. Juan Alberto Aguirre MD.    XR Sacrum & Coccyx    Result Date: 2/3/2022  1. Negative sacrum and coccyx This report was finalized on  "02/03/2022 16:16 by Dr. Juan Alberto Aguirre MD.    CT Head Without Contrast    Result Date: 2/3/2022  1. No acute intracranial process. 2. Sinusitis   This report was finalized on 02/03/2022 16:08 by Dr. Juan Alberto Aguirre MD.    CT Cervical Spine Without Contrast    Result Date: 2/3/2022  1. No evidence of acute osseous injury or malalignment in the cervical spine.   This report was finalized on 02/03/2022 16:11 by Dr. Juan Alberto Aguirre MD.    XR Spine Lumbar Complete 4+VW    Result Date: 2/3/2022  1. Normal radiographs of the lumbar spine.   This report was finalized on 02/03/2022 16:14 by Dr. Juan Alberto Aguirre MD.      Objective   /70   Pulse 80   Temp 97.1 °F (36.2 °C)   Resp 16   Ht 172.7 cm (68\")   Wt 83.5 kg (184 lb)   SpO2 98%   BMI 27.98 kg/m²   Body mass index is 27.98 kg/m².      Recent Vitals       2/3/2022 2/3/2022 2/3/2022       BP: -- 150/72 122/71     Pulse: -- 62 74     Temp: -- 98.5 °F (36.9 °C) 98.5 °F (36.9 °C)     Weight: 75.3 kg (166 lb) -- --     BMI (Calculated): 25.2 -- --         Vitals:    02/10/22 1114   BP: 118/70   Pulse: 80   Resp: 16   Temp: 97.1 °F (36.2 °C)   SpO2: 98%       Physical Exam  Vitals reviewed.   Constitutional:       General: He is not in acute distress.     Appearance: Normal appearance. He is not ill-appearing.   HENT:      Head: Normocephalic.        Comments: Sore here     Right Ear: Tympanic membrane, ear canal and external ear normal.      Left Ear: Tympanic membrane, ear canal and external ear normal.      Mouth/Throat:      Mouth: Mucous membranes are moist.   Eyes:      Extraocular Movements: Extraocular movements intact.      Conjunctiva/sclera: Conjunctivae normal.      Pupils: Pupils are equal, round, and reactive to light.   Cardiovascular:      Rate and Rhythm: Normal rate and regular rhythm.   Pulmonary:      Effort: Pulmonary effort is normal.      Breath sounds: Normal breath sounds.   Abdominal:      Palpations: Abdomen is soft.      Tenderness: There is " no abdominal tenderness.   Musculoskeletal:         General: Tenderness (across lower sacrum) present. No swelling or deformity.      Cervical back: Normal range of motion and neck supple. No rigidity or tenderness.      Right lower leg: No edema.      Left lower leg: No edema.   Skin:     General: Skin is warm and dry.      Findings: No bruising.   Neurological:      General: No focal deficit present.      Mental Status: He is alert and oriented to person, place, and time. Mental status is at baseline.      Cranial Nerves: No cranial nerve deficit.      Coordination: Coordination normal.      Gait: Gait normal.      Deep Tendon Reflexes: Reflexes normal.   Psychiatric:         Mood and Affect: Mood normal.         Behavior: Behavior normal.         Thought Content: Thought content normal.         Judgment: Judgment normal.       Assessment/Plan     1. Fall, subsequent encounter    2. Work related injury    3. Contusion of head, unspecified part of head, subsequent encounter    4. Contusion of back, unspecified laterality, subsequent encounter    5. Sacral pain      Discussion:  BP ok  Other vitals ok    Would expect areas of soreness to continue to improve and more represent contusion injuries  Any head injury can result in problems later; report any unusual HA, nausea/vomiting, or slurred speech, UE/LE weakness or dysfunction  Return to work next week; he will report if problems from here to there that would change this decision    Immunization History   Administered Date(s) Administered   • COVID-19 (MODERNA) 1st, 2nd, 3rd Dose Only 01/11/2021, 02/01/2021   • Flu Vaccine Intradermal Quad 18-64YR 01/14/2020   • FluLaval/Fluarix/Fluzone >6 11/01/2021   • flucelvax quad pfs =>4 YRS 01/14/2020     Vaccine reviewed: today none; later continue to review    Medical decision issues:   Data review above:   Rx: reviewed and decisions:   Same Rx for now     Visit today involved chronic significant medical problems or  differentials and/or intensive drug monitoring: ie potential to cause serious morbidity or death:   No orders of the defined types were placed in this encounter.      Orders placed:   LAB/Testing/Referrals: reviewed/orders:   Today: none  No orders of the defined types were placed in this encounter.    Chronic/recurrent labs above or change to:   same       Health maintenance:   Body mass index is 27.98 kg/m².  Patient's Body mass index is 27.98 kg/m². indicating that he is overweight (BMI 25-29.9). Patient's (Body mass index is 27.98 kg/m².) indicates that they are overweight with health conditions that include none . Weight is unchanged. BMI is is above average; BMI management plan is completed. We discussed portion control and increasing exercise. .      Tobacco use reviewed:   Selvin Huntleysandipkylee  reports that he has never smoked. He has never used smokeless tobacco.  There are no Patient Instructions on file for this visit.    Follow up: Return for work note; then lab;, Dr Pedersen-, as planned;.  No future appointments.

## 2022-02-11 ENCOUNTER — TELEPHONE (OUTPATIENT)
Dept: FAMILY MEDICINE CLINIC | Facility: CLINIC | Age: 57
End: 2022-02-11

## 2022-02-11 DIAGNOSIS — J32.9 CHRONIC SINUSITIS, UNSPECIFIED LOCATION: ICD-10-CM

## 2022-02-11 RX ORDER — AZITHROMYCIN 250 MG/1
TABLET, FILM COATED ORAL
Qty: 6 TABLET | Refills: 0 | Status: SHIPPED | OUTPATIENT
Start: 2022-02-11 | End: 2022-03-02

## 2022-02-11 RX ORDER — FLUTICASONE PROPIONATE 50 MCG
2 SPRAY, SUSPENSION (ML) NASAL DAILY
Qty: 16 G | Refills: 11 | Status: SHIPPED | OUTPATIENT
Start: 2022-02-11 | End: 2022-04-14 | Stop reason: SDUPTHER

## 2022-02-11 NOTE — TELEPHONE ENCOUNTER
He made contact with ro with concern for     Sorry to bother but I believe him having some kind of bad sinus infection.  I bent over earlier in this foul-smelling green liquid ran out of my nose.  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

## 2022-02-18 ENCOUNTER — TELEPHONE (OUTPATIENT)
Dept: FAMILY MEDICINE CLINIC | Facility: CLINIC | Age: 57
End: 2022-02-18

## 2022-02-18 DIAGNOSIS — R41.840 POOR CONCENTRATION: Primary | ICD-10-CM

## 2022-02-18 RX ORDER — METHYLPHENIDATE HYDROCHLORIDE 36 MG/1
36 TABLET ORAL EVERY MORNING
Qty: 30 TABLET | Refills: 0 | Status: SHIPPED | OUTPATIENT
Start: 2022-02-18 | End: 2022-02-22 | Stop reason: SDUPTHER

## 2022-02-18 NOTE — TELEPHONE ENCOUNTER
Ok  ro did 27 to 36        Regarding: Concerta refill and dose  ----- Message from Tina Laureano MA sent at 2/18/2022  7:27 AM CST -----       ----- Message from Selvin Garcia Randy Eugene, MD sent at 2/17/2022  5:57 PM -----   Aquilino my concerta is due to be refilled. Would you consider the next available up dose than what I am on now. I don’t exactly feel like I am experiencing the full benefit I was expecting. Of course I will follow your advice regarding this I am only inquiring if it is feasible. Thank you.

## 2022-02-22 DIAGNOSIS — R41.840 POOR CONCENTRATION: ICD-10-CM

## 2022-02-22 RX ORDER — METHYLPHENIDATE HYDROCHLORIDE 36 MG/1
36 TABLET ORAL EVERY MORNING
Qty: 30 TABLET | Refills: 0 | Status: SHIPPED | OUTPATIENT
Start: 2022-02-22 | End: 2022-04-12 | Stop reason: SDUPTHER

## 2022-02-22 NOTE — TELEPHONE ENCOUNTER
The script was sent to [facility] phamacy and can not be transferred due to it is a controlled med

## 2022-03-02 ENCOUNTER — HOSPITAL ENCOUNTER (EMERGENCY)
Facility: HOSPITAL | Age: 57
Discharge: HOME | End: 2022-03-02

## 2022-03-02 PROCEDURE — 99211 OFF/OP EST MAY X REQ PHY/QHP: CPT

## 2022-03-09 ENCOUNTER — TELEPHONE (OUTPATIENT)
Dept: OTOLARYNGOLOGY | Facility: CLINIC | Age: 57
End: 2022-03-09

## 2022-03-09 NOTE — TELEPHONE ENCOUNTER
Tried to call patient to inform him we have scheduled john on 3-19-22 at 1:30. Will call back later today

## 2022-03-10 ENCOUNTER — OFFICE VISIT (OUTPATIENT)
Dept: OTOLARYNGOLOGY | Facility: CLINIC | Age: 57
End: 2022-03-10

## 2022-03-10 VITALS
BODY MASS INDEX: 27.83 KG/M2 | SYSTOLIC BLOOD PRESSURE: 137 MMHG | DIASTOLIC BLOOD PRESSURE: 81 MMHG | WEIGHT: 183.6 LBS | HEIGHT: 68 IN | HEART RATE: 81 BPM | TEMPERATURE: 97.8 F

## 2022-03-10 DIAGNOSIS — J01.90 ACUTE SINUSITIS, RECURRENCE NOT SPECIFIED, UNSPECIFIED LOCATION: ICD-10-CM

## 2022-03-10 DIAGNOSIS — J32.8 OTHER CHRONIC SINUSITIS: Primary | ICD-10-CM

## 2022-03-10 DIAGNOSIS — H93.13 TINNITUS OF BOTH EARS: ICD-10-CM

## 2022-03-10 PROCEDURE — 99213 OFFICE O/P EST LOW 20 MIN: CPT | Performed by: OTOLARYNGOLOGY

## 2022-03-10 RX ORDER — METHYLPREDNISOLONE 4 MG/1
TABLET ORAL
Qty: 1 EACH | Refills: 0 | Status: SHIPPED | OUTPATIENT
Start: 2022-03-10 | End: 2022-03-10 | Stop reason: SDUPTHER

## 2022-03-10 RX ORDER — METHYLPREDNISOLONE 4 MG/1
TABLET ORAL
Qty: 1 EACH | Refills: 0 | Status: SHIPPED | OUTPATIENT
Start: 2022-03-10 | End: 2022-04-05

## 2022-03-10 RX ORDER — CEFDINIR 300 MG/1
300 CAPSULE ORAL 2 TIMES DAILY
Qty: 20 CAPSULE | Refills: 1 | Status: SHIPPED | OUTPATIENT
Start: 2022-03-10 | End: 2022-03-20

## 2022-03-10 RX ORDER — CEFDINIR 300 MG/1
300 CAPSULE ORAL 2 TIMES DAILY
Qty: 20 CAPSULE | Refills: 1 | Status: SHIPPED | OUTPATIENT
Start: 2022-03-10 | End: 2022-03-10 | Stop reason: SDUPTHER

## 2022-03-10 NOTE — PROGRESS NOTES
Oscar Urrutia MD  [facility] [place] [facility] EAR NOSE & THROAT  [address], [address]  [facility] [address]  Fax [phone]  Phone [phone]      Visit Type: FOLLOW UP   Chief Complaint   Patient presents with   • Follow-up   • Sinus Problem        HPI  Selvin Garcia is a  56 y.o. male who is here for follow up. He has had a recent flair up of sinusitis.  He has had 2 months of increased nasal congestion, colored nasal drainage, and facial pain and pressure.  He reports he has a lot of tenderness in his ears.  He has been on a course of azithromycin and clindamycin without improvement.He is status post bilateral maxillary antrostomies, bilateral ethmoidectomies, endoscopic frontal sinusotomies and sphenoidotomies on July 26, 2016.  He is status post septoplasty about 8 years prior to this.     Past Medical History:   Diagnosis Date   • Abnormal CT of paranasal sinuses 08/20/2015    (Note 1 of 3)  NASAL SEPTUM:  The nasal septum is relatively midline.  MAXILLARY SINUSES:  The left maxillary sinus showed >5 m mucosal thickening and 50% opacification and the right maxillary sinus showed >5 mm mucosal thickening. The osteomeatal complex is obstructed bilaterally.   • Abnormal CT of paranasal sinuses 08/20/2015    (Note 2 of 3)  NASAL TURBINATES: The inferior turbinates showed bilateral hypertrophy. The Cherie bullosa are not present.  ETHMOID SINUSES: The left ethmoid sinus showed >3 mm mucosal thickening and complete opacification. The lamina papyracea appears intact bilaterally.   • Abnormal CT of paranasal sinuses 08/20/2015    (Note 3 of 3)  FRONTAL SINUSES:  The left frontal sinus showed complete opacification. The right frontal sinus showed complete opacification. SPENOID SINUSES: The left sphenoid sinus showed moderate mucosal thickening. The right sphenoid sinus showed moderate mucosal thickening.   • Allergic rhinitis 12/01/2015   • Chronic rhinitis     • Chronic sinusitis    • Disturbances of sensation of smell and taste 12/01/2015   • Diverticulosis    • History of adenomatous polyp of colon    • History of colon polyps    • Sensorineural hearing loss, bilateral 12/01/2015   • Subjective tinnitus 12/01/2015       Past Surgical History:   Procedure Laterality Date   • COLONOSCOPY  12/09/2013    Dr. Chavez-Polyp; Repeat 5 years (Abstracted from PCP's note)   • COLONOSCOPY N/A 9/24/2021    One 6mm tubular adenomatous polyp in the ascending colon-Resected and retrieved-Clip (MR conditional) was placed; Diverticulosis in the left colon; Non-bleeding internal hemorrhoids; Repeat 5 years    • ENDOSCOPY  08/14/2015    Dr. Mullins-Gastric ulcer; Repeat 8 weeks (Abstracted from PCP's note)   • ENDOSCOPY  10/16/2015    Dr. Mullins-Dr. Mullins (Abstracted from PCP's note)   • ENDOSCOPY N/A 10/15/2021    Procedure: ESOPHAGOGASTRODUODENOSCOPY WITH ANESTHESIA;  Surgeon: Isaura Fragoso MD;  Location: [facility] ENDOSCOPY;  Service: Gastroenterology;  Laterality: N/A;  pre screen  post balloon  Dr. Pedersen   • FRACTURE SURGERY     • FUNCTIONAL ENDOSCOPIC SINUS SURGERY  07/26/2016   • HEMORRHOIDECTOMY     • KNEE ARTHROPLASTY, PARTIAL REPLACEMENT Left    • KNEE MENISCECTOMY Right     X 2   • OTHER SURGICAL HISTORY      Open Reduction-Internal Fixation   • ROTATOR CUFF REPAIR Right    • SEPTOPLASTY      Per Dr. Urrutia 7-8 years ago       Family History: His family history includes Diabetes in an other family member; No Known Problems in his father and mother.     Social History: He  reports that he has never smoked. He has never used smokeless tobacco. He reports current alcohol use. He reports that he does not use drugs.    Home Medications:  albuterol sulfate HFA, azelastine, cefdinir, cetirizine, fluticasone, methylPREDNISolone, methylphenidate, and montelukast    Allergies:  He has No Known Allergies.       Vital Signs:   Temp:  [97.8 °F (36.6 °C)] 97.8 °F (36.6 °C)  Heart Rate:   [81] 81  BP: (137)/(81) 137/81  ENT Physical Exam  Constitutional  Appearance: patient appears well-developed and well-nourished,  Communication/Voice: communication appropriate for developmental age; vocal quality normal;  Head and Face  Appearance: head appears normal, face appears normal and face appears atraumatic;  Palpation: facial palpation normal;  Salivary: glands normal;  Ear  Hearing: intact;  Auricles: right auricle normal; left auricle normal;  External Mastoids: right external mastoid normal; left external mastoid normal;  Ear Canals: bilateral ear canals normal;  Tympanic Membranes: bilateral tympanic membranes normal;  Nose  External Nose: nares patent bilaterally; external nose normal;  Internal Nose: bilateral intranasal mucosa edematous; bilateral inferior turbinates edematous;  Oral Cavity/Oropharynx  Lips: normal;  Neck  Neck: neck normal;  Respiratory  Inspection: breathing unlabored; normal breathing rate;  Cardiovascular  Inspection: extremities are warm and well perfused; no peripheral edema present;  Neurovestibular  Mental Status: alert and oriented;  Psychiatric: mood normal; affect is appropriate;         Result Review    RESULTS REVIEW    I have reviewed the patients old records in the chart.     Assessment/Plan    Diagnoses and all orders for this visit:    1. Other chronic sinusitis (Primary)    2. Acute sinusitis, recurrence not specified, unspecified location  -     cefdinir (OMNICEF) 300 MG capsule; Take 1 capsule by mouth 2 (Two) Times a Day for 10 days.  Dispense: 20 capsule; Refill: 1  -     methylPREDNISolone (Medrol) 4 MG dose pack; Take as directed on package instructions.  Dispense: 1 each; Refill: 0    3. Tinnitus of both ears       Use Neomed sinus rinse 2 times a day.  If not any better, we will consider CT scan of the sinuses.           Return in about 4 weeks (around 4/7/2022).      Oscar Urrutia MD  03/10/22  13:24 CST

## 2022-03-23 ENCOUNTER — TELEPHONE (OUTPATIENT)
Dept: FAMILY MEDICINE CLINIC | Facility: CLINIC | Age: 57
End: 2022-03-23

## 2022-03-23 RX ORDER — OSELTAMIVIR PHOSPHATE 75 MG/1
75 CAPSULE ORAL DAILY
Qty: 10 CAPSULE | Refills: 0 | Status: SHIPPED | OUTPATIENT
Start: 2022-03-23 | End: 2022-04-02

## 2022-03-23 NOTE — TELEPHONE ENCOUNTER
Telephone:  Positive flu in household - preventative Tamiflu sent for patient.    Electronically signed by RIRI Hall, 03/23/22, 1:02 PM CDT.

## 2022-03-28 ENCOUNTER — TELEPHONE (OUTPATIENT)
Dept: FAMILY MEDICINE CLINIC | Facility: CLINIC | Age: 57
End: 2022-03-28

## 2022-04-05 ENCOUNTER — HOSPITAL ENCOUNTER (OUTPATIENT)
Dept: GENERAL RADIOLOGY | Facility: HOSPITAL | Age: 57
Discharge: HOME | End: 2022-04-05
Admitting: NURSE PRACTITIONER

## 2022-04-05 DIAGNOSIS — R05.9 COUGH: Primary | ICD-10-CM

## 2022-04-05 DIAGNOSIS — R05.9 COUGH: ICD-10-CM

## 2022-04-05 PROCEDURE — 71046 X-RAY EXAM CHEST 2 VIEWS: CPT

## 2022-04-05 RX ORDER — GUAIFENESIN AND CODEINE PHOSPHATE 100; 10 MG/5ML; MG/5ML
5 SOLUTION ORAL 3 TIMES DAILY PRN
Qty: 118 ML | Refills: 0 | Status: SHIPPED | OUTPATIENT
Start: 2022-04-05 | End: 2022-12-08

## 2022-04-05 RX ORDER — PREDNISONE 20 MG/1
20 TABLET ORAL DAILY
Qty: 5 TABLET | Refills: 0 | Status: SHIPPED | OUTPATIENT
Start: 2022-04-05 | End: 2022-04-10

## 2022-04-12 ENCOUNTER — PATIENT MESSAGE (OUTPATIENT)
Dept: FAMILY MEDICINE CLINIC | Facility: CLINIC | Age: 57
End: 2022-04-12

## 2022-04-12 DIAGNOSIS — R41.840 POOR CONCENTRATION: ICD-10-CM

## 2022-04-12 RX ORDER — METHYLPHENIDATE HYDROCHLORIDE 36 MG/1
36 TABLET ORAL EVERY MORNING
Qty: 30 TABLET | Refills: 0 | Status: SHIPPED | OUTPATIENT
Start: 2022-04-12 | End: 2022-04-15

## 2022-04-12 NOTE — TELEPHONE ENCOUNTER
Rx Refill Note  Requested Prescriptions     Pending Prescriptions Disp Refills   • methylphenidate (Concerta) 36 MG CR tablet 30 tablet 0     Sig: Take 1 tablet by mouth Every Morning      Last office visit with prescribing clinician: 6/8/2021      Next office visit with prescribing clinician: Visit date not found            Reena Martin LPN  04/12/22, 14:43 CDT       [facility] wnl

## 2022-04-14 ENCOUNTER — OFFICE VISIT (OUTPATIENT)
Dept: OTOLARYNGOLOGY | Facility: CLINIC | Age: 57
End: 2022-04-14

## 2022-04-14 VITALS
TEMPERATURE: 98.1 F | DIASTOLIC BLOOD PRESSURE: 95 MMHG | SYSTOLIC BLOOD PRESSURE: 142 MMHG | WEIGHT: 188 LBS | BODY MASS INDEX: 28.49 KG/M2 | HEIGHT: 68 IN

## 2022-04-14 DIAGNOSIS — J32.9 CHRONIC SINUSITIS, UNSPECIFIED LOCATION: ICD-10-CM

## 2022-04-14 DIAGNOSIS — R05.9 COUGH: ICD-10-CM

## 2022-04-14 PROCEDURE — 31231 NASAL ENDOSCOPY DX: CPT | Performed by: OTOLARYNGOLOGY

## 2022-04-14 PROCEDURE — 99213 OFFICE O/P EST LOW 20 MIN: CPT | Performed by: OTOLARYNGOLOGY

## 2022-04-14 RX ORDER — ALBUTEROL SULFATE 90 UG/1
2 AEROSOL, METERED RESPIRATORY (INHALATION) EVERY 4 HOURS PRN
Qty: 25.5 G | Refills: 0 | Status: SHIPPED | OUTPATIENT
Start: 2022-04-14 | End: 2022-04-14

## 2022-04-14 RX ORDER — AZELASTINE HYDROCHLORIDE 0.5 MG/ML
1 SOLUTION/ DROPS OPHTHALMIC 2 TIMES DAILY
Qty: 3 EACH | Refills: 3 | Status: SHIPPED | OUTPATIENT
Start: 2022-04-14 | End: 2022-07-13

## 2022-04-14 RX ORDER — FLUTICASONE PROPIONATE 50 MCG
2 SPRAY, SUSPENSION (ML) NASAL DAILY
Qty: 48 G | Refills: 3 | Status: SHIPPED | OUTPATIENT
Start: 2022-04-14 | End: 2022-07-13

## 2022-04-14 RX ORDER — MONTELUKAST SODIUM 10 MG/1
10 TABLET ORAL NIGHTLY
Qty: 90 TABLET | Refills: 3 | Status: SHIPPED | OUTPATIENT
Start: 2022-04-14

## 2022-04-14 RX ORDER — MONTELUKAST SODIUM 10 MG/1
10 TABLET ORAL NIGHTLY
Qty: 30 TABLET | Refills: 3
Start: 2022-04-14 | End: 2022-04-14

## 2022-04-14 RX ORDER — CETIRIZINE HYDROCHLORIDE 10 MG/1
10 TABLET ORAL DAILY
Qty: 90 TABLET | Refills: 3 | Status: SHIPPED | OUTPATIENT
Start: 2022-04-14 | End: 2022-07-13

## 2022-04-14 RX ORDER — CETIRIZINE HYDROCHLORIDE 10 MG/1
10 TABLET ORAL DAILY
Qty: 90 TABLET | Refills: 3 | Status: SHIPPED | OUTPATIENT
Start: 2022-04-14 | End: 2022-04-14

## 2022-04-14 RX ORDER — AZELASTINE HYDROCHLORIDE 0.5 MG/ML
1 SOLUTION/ DROPS OPHTHALMIC 2 TIMES DAILY
Qty: 6 ML | Refills: 3 | Status: SHIPPED | OUTPATIENT
Start: 2022-04-14 | End: 2022-04-14

## 2022-04-14 RX ORDER — FLUTICASONE PROPIONATE 50 MCG
2 SPRAY, SUSPENSION (ML) NASAL DAILY
Qty: 16 G | Refills: 11 | Status: SHIPPED | OUTPATIENT
Start: 2022-04-14 | End: 2022-04-14

## 2022-04-14 RX ORDER — ALBUTEROL SULFATE 90 UG/1
2 AEROSOL, METERED RESPIRATORY (INHALATION) EVERY 4 HOURS PRN
Qty: 25.5 G | Refills: 3 | Status: SHIPPED | OUTPATIENT
Start: 2022-04-14 | End: 2022-07-13

## 2022-04-14 NOTE — PROGRESS NOTES
Oscar Urrutia MD  [facility] ENT [facility] EAR NOSE & THROAT  [address], [address]  [place] [facility] [address]  Fax [phone]  Phone [phone]      Visit Type: FOLLOW UP   Chief Complaint   Patient presents with   • Sinusitis     No new problems        HPI  Selvin Garcia is a  56 y.o. male who is here for follow up.      Past Medical History:   Diagnosis Date   • Abnormal CT of paranasal sinuses 08/20/2015    (Note 1 of 3)  NASAL SEPTUM:  The nasal septum is relatively midline.  MAXILLARY SINUSES:  The left maxillary sinus showed >5 m mucosal thickening and 50% opacification and the right maxillary sinus showed >5 mm mucosal thickening. The osteomeatal complex is obstructed bilaterally.   • Abnormal CT of paranasal sinuses 08/20/2015    (Note 2 of 3)  NASAL TURBINATES: The inferior turbinates showed bilateral hypertrophy. The Cherie bullosa are not present.  ETHMOID SINUSES: The left ethmoid sinus showed >3 mm mucosal thickening and complete opacification. The lamina papyracea appears intact bilaterally.   • Abnormal CT of paranasal sinuses 08/20/2015    (Note 3 of 3)  FRONTAL SINUSES:  The left frontal sinus showed complete opacification. The right frontal sinus showed complete opacification. SPENOID SINUSES: The left sphenoid sinus showed moderate mucosal thickening. The right sphenoid sinus showed moderate mucosal thickening.   • Allergic rhinitis 12/01/2015   • Chronic rhinitis    • Chronic sinusitis    • Disturbances of sensation of smell and taste 12/01/2015   • Diverticulosis    • History of adenomatous polyp of colon    • History of colon polyps    • Sensorineural hearing loss, bilateral 12/01/2015   • Subjective tinnitus 12/01/2015       Past Surgical History:   Procedure Laterality Date   • COLONOSCOPY  12/09/2013    Dr. Chavez-Polyp; Repeat 5 years (Abstracted from PCP's note)   • COLONOSCOPY N/A 9/24/2021    One 6mm tubular adenomatous  polyp in the ascending colon-Resected and retrieved-Clip (MR conditional) was placed; Diverticulosis in the left colon; Non-bleeding internal hemorrhoids; Repeat 5 years    • ENDOSCOPY  08/14/2015    Dr. Mullins-Gastric ulcer; Repeat 8 weeks (Abstracted from PCP's note)   • ENDOSCOPY  10/16/2015    Dr. Mullins-Dr. Mullins (Abstracted from PCP's note)   • ENDOSCOPY N/A 10/15/2021    Procedure: ESOPHAGOGASTRODUODENOSCOPY WITH ANESTHESIA;  Surgeon: Isaura Fragoso MD;  Location: [facility] ENDOSCOPY;  Service: Gastroenterology;  Laterality: N/A;  pre screen  post balloon  Dr. Pedersen   • FRACTURE SURGERY     • FUNCTIONAL ENDOSCOPIC SINUS SURGERY  07/26/2016   • HEMORRHOIDECTOMY     • KNEE ARTHROPLASTY, PARTIAL REPLACEMENT Left    • KNEE MENISCECTOMY Right     X 2   • OTHER SURGICAL HISTORY      Open Reduction-Internal Fixation   • ROTATOR CUFF REPAIR Right    • SEPTOPLASTY      Per Dr. Urrutia 7-8 years ago       Family History: His family history includes Diabetes in an other family member; No Known Problems in his father and mother.     Social History: He  reports that he has never smoked. He has never used smokeless tobacco. He reports current alcohol use. He reports that he does not use drugs.    Home Medications:  albuterol sulfate HFA, azelastine, cetirizine, fluticasone, guaiFENesin-codeine, methylphenidate, and montelukast    Allergies:  He has No Known Allergies.       Vital Signs:   Temp:  [98.1 °F (36.7 °C)] 98.1 °F (36.7 °C)  BP: (142)/(95) 142/95  ENT Physical Exam       Result Review    RESULTS REVIEW    I have reviewed the patients old records in the chart.     Assessment/Plan    Diagnoses and all orders for this visit:    1. Bronchitis  -     Discontinue: albuterol sulfate HFA (ProAir HFA) 108 (90 Base) MCG/ACT inhaler; Inhale 2 puffs Every 4 (Four) Hours As Needed for Wheezing.  Dispense: 25.5 g; Refill: 0  -     albuterol sulfate HFA (ProAir HFA) 108 (90 Base) MCG/ACT inhaler; Inhale 2 puffs Every 4 (Four)  Hours As Needed for Wheezing for up to 90 days.  Dispense: 25.5 g; Refill: 3    2. Cough  -     Discontinue: albuterol sulfate HFA (ProAir HFA) 108 (90 Base) MCG/ACT inhaler; Inhale 2 puffs Every 4 (Four) Hours As Needed for Wheezing.  Dispense: 25.5 g; Refill: 0  -     albuterol sulfate HFA (ProAir HFA) 108 (90 Base) MCG/ACT inhaler; Inhale 2 puffs Every 4 (Four) Hours As Needed for Wheezing for up to 90 days.  Dispense: 25.5 g; Refill: 3    3. Chronic sinusitis, unspecified location  -     Discontinue: fluticasone (FLONASE) 50 MCG/ACT nasal spray; 2 sprays into the nostril(s) as directed by provider Daily.  Dispense: 16 g; Refill: 11  -     fluticasone (FLONASE) 50 MCG/ACT nasal spray; 2 sprays into the nostril(s) as directed by provider Daily for 90 days.  Dispense: 48 g; Refill: 3    Other orders  -     Discontinue: montelukast (SINGULAIR) 10 MG tablet; Take 1 tablet by mouth Every Night.  Dispense: 30 tablet; Refill: 3  -     Discontinue: cetirizine (zyrTEC) 10 MG tablet; Take 1 tablet by mouth Daily.  Dispense: 90 tablet; Refill: 3  -     Discontinue: azelastine (OPTIVAR) 0.05 % ophthalmic solution; Administer 1 drop to both eyes 2 (Two) Times a Day.  Dispense: 6 mL; Refill: 3  -     azelastine (OPTIVAR) 0.05 % ophthalmic solution; Administer 1 drop to both eyes 2 (Two) Times a Day for 90 days.  Dispense: 3 each; Refill: 3  -     cetirizine (zyrTEC) 10 MG tablet; Take 1 tablet by mouth Daily for 90 days.  Dispense: 90 tablet; Refill: 3  -     montelukast (SINGULAIR) 10 MG tablet; Take 1 tablet by mouth Every Night.  Dispense: 90 tablet; Refill: 3                  Return in about 3 months (around 7/14/2022).      Oscar Urrutia MD  04/14/22  13:29 CDT

## 2022-04-14 NOTE — PROGRESS NOTES
Oscar Urrutia MD  [facility] ENT [facility] EAR NOSE & THROAT  [address], [address]  [place] [address]  Fax [phone]  Phone [phone]      Visit Type: FOLLOW UP   Chief Complaint   Patient presents with   • Sinusitis     No new problems        HPI  Selvin Garcia is a  56 y.o. male who is here for follow up.  On his last visit, he was treated with antibiotics and steroids for a acute sinusitis episode.  At that completion of these medications, he developed flu and reports that he had a increase in the minor congestion and drainage.  He describes a real sticky thick drainage.He is status post bilateral maxillary antrostomies, bilateral ethmoidectomies, endoscopic frontal sinusotomies and sphenoidotomies on July 26, 2016.  He is status post septoplasty about 8 years prior to this.       Past Medical History:   Diagnosis Date   • Abnormal CT of paranasal sinuses 08/20/2015    (Note 1 of 3)  NASAL SEPTUM:  The nasal septum is relatively midline.  MAXILLARY SINUSES:  The left maxillary sinus showed >5 m mucosal thickening and 50% opacification and the right maxillary sinus showed >5 mm mucosal thickening. The osteomeatal complex is obstructed bilaterally.   • Abnormal CT of paranasal sinuses 08/20/2015    (Note 2 of 3)  NASAL TURBINATES: The inferior turbinates showed bilateral hypertrophy. The Cherie bullosa are not present.  ETHMOID SINUSES: The left ethmoid sinus showed >3 mm mucosal thickening and complete opacification. The lamina papyracea appears intact bilaterally.   • Abnormal CT of paranasal sinuses 08/20/2015    (Note 3 of 3)  FRONTAL SINUSES:  The left frontal sinus showed complete opacification. The right frontal sinus showed complete opacification. SPENOID SINUSES: The left sphenoid sinus showed moderate mucosal thickening. The right sphenoid sinus showed moderate mucosal thickening.   • Allergic rhinitis 12/01/2015   • Chronic rhinitis     • Chronic sinusitis    • Disturbances of sensation of smell and taste 12/01/2015   • Diverticulosis    • History of adenomatous polyp of colon    • History of colon polyps    • Sensorineural hearing loss, bilateral 12/01/2015   • Subjective tinnitus 12/01/2015       Past Surgical History:   Procedure Laterality Date   • COLONOSCOPY  12/09/2013    Dr. Chavez-Polyp; Repeat 5 years (Abstracted from PCP's note)   • COLONOSCOPY N/A 9/24/2021    One 6mm tubular adenomatous polyp in the ascending colon-Resected and retrieved-Clip (MR conditional) was placed; Diverticulosis in the left colon; Non-bleeding internal hemorrhoids; Repeat 5 years    • ENDOSCOPY  08/14/2015    Dr. Mullins-Gastric ulcer; Repeat 8 weeks (Abstracted from PCP's note)   • ENDOSCOPY  10/16/2015    Dr. Mullins-Dr. Mullins (Abstracted from PCP's note)   • ENDOSCOPY N/A 10/15/2021    Procedure: ESOPHAGOGASTRODUODENOSCOPY WITH ANESTHESIA;  Surgeon: Isaura Fragoso MD;  Location: [facility] ENDOSCOPY;  Service: Gastroenterology;  Laterality: N/A;  pre screen  post balloon  Dr. Pedersen   • FRACTURE SURGERY     • FUNCTIONAL ENDOSCOPIC SINUS SURGERY  07/26/2016   • HEMORRHOIDECTOMY     • KNEE ARTHROPLASTY, PARTIAL REPLACEMENT Left    • KNEE MENISCECTOMY Right     X 2   • OTHER SURGICAL HISTORY      Open Reduction-Internal Fixation   • ROTATOR CUFF REPAIR Right    • SEPTOPLASTY      Per Dr. Urrutia 7-8 years ago       Family History: His family history includes Diabetes in an other family member; No Known Problems in his father and mother.     Social History: He  reports that he has never smoked. He has never used smokeless tobacco. He reports current alcohol use. He reports that he does not use drugs.    Home Medications:  albuterol sulfate HFA, azelastine, cetirizine, fluticasone, guaiFENesin-codeine, methylphenidate, and montelukast    Allergies:  He has No Known Allergies.       Vital Signs:   Temp:  [98.1 °F (36.7 °C)] 98.1 °F (36.7 °C)  BP: (142)/(95)  142/95  ENT Physical Exam  Constitutional  Appearance: patient appears well-developed and well-nourished,  Communication/Voice: communication appropriate for developmental age; vocal quality normal;  Head and Face  Appearance: head appears normal, face appears normal and face appears atraumatic;  Palpation: facial palpation normal;  Salivary: glands normal;  Ear  Hearing: intact;  Auricles: right auricle normal; left auricle normal;  External Mastoids: right external mastoid normal; left external mastoid normal;  Nose  External Nose: nares patent bilaterally; external nose normal;  Internal Nose: bilateral intranasal mucosa edematous; bilateral inferior turbinates edematous;  Oral Cavity/Oropharynx  Lips: normal;  Neck  Neck: neck normal;  Respiratory  Inspection: breathing unlabored; normal breathing rate;  Cardiovascular  Inspection: extremities are warm and well perfused; no peripheral edema present;  Neurovestibular  Mental Status: alert and oriented;  Psychiatric: mood normal; affect is appropriate;     Nasal endoscopy    Date/Time: 4/14/2022 1:40 PM  Performed by: Oscar Urrutia MD  Authorized by: Oscar Urrutia MD     Procedure details:     Indications: sino-nasal symptoms      Medication:  None    Instrument: flexible nasal nasal endoscope      Scope location: bilateral nare    Nasal cavity:     right nasal cavity not occluded, left nasal cavity not occluded, no right polyp and no left polyp      Right inferior turbinates: edema      Left inferior turbinates: edema    Septum:     Findings: normal    Sinus/ Nasopharynx:     Right middle meatus: normal      Right middle meatus: no polyps and no pus      Left middle meatus: normal and antrostomy open and clean      Left middle meatus: no pus and no polyps    Post-procedure details:     Patient tolerance of procedure:  Tolerated well       Result Review    RESULTS REVIEW    I have reviewed the patients old records in the chart.     Assessment/Plan     Diagnoses and all orders for this visit:    1. Chronic sinusitis, unspecified location  -     azelastine (OPTIVAR) 0.05 % ophthalmic solution; Administer 1 drop to both eyes 2 (Two) Times a Day for 90 days.  Dispense: 3 each; Refill: 3  -     cetirizine (zyrTEC) 10 MG tablet; Take 1 tablet by mouth Daily for 90 days.  Dispense: 90 tablet; Refill: 3  -     fluticasone (FLONASE) 50 MCG/ACT nasal spray; 2 sprays into the nostril(s) as directed by provider Daily for 90 days.  Dispense: 48 g; Refill: 3  -     montelukast (SINGULAIR) 10 MG tablet; Take 1 tablet by mouth Every Night.  Dispense: 90 tablet; Refill: 3    Since he feels he is somewhat improving after the antibiotics, would like to see what happens over the next several weeks.  If is not improved, will consider CT scanning to rule out further chronic sinusitis change.  I have given him refills of his medication.                Return in about 3 months (around 7/14/2022).      Oscar Urrutia MD  04/14/22  13:37 CDT

## 2022-04-15 DIAGNOSIS — R41.840 POOR CONCENTRATION: ICD-10-CM

## 2022-04-15 RX ORDER — METHYLPHENIDATE HYDROCHLORIDE 36 MG/1
36 TABLET ORAL EVERY MORNING
Qty: 30 TABLET | Refills: 0 | Status: SHIPPED | OUTPATIENT
Start: 2022-04-15 | End: 2022-05-16 | Stop reason: SDUPTHER

## 2022-04-15 RX ORDER — METHYLPHENIDATE HYDROCHLORIDE 36 MG/1
36 TABLET ORAL EVERY MORNING
Qty: 30 TABLET | Refills: 0 | Status: CANCELLED | OUTPATIENT
Start: 2022-04-15

## 2022-04-15 NOTE — TELEPHONE ENCOUNTER
Pt sent my chart message and pt sent another to advise his conceta was not at [facility], noted that his rx went to [facility], called [facility] and cancel previous rx and will re submit to [facility] in [place]      Noted on rx    methylphenidate (Concerta) 36 MG CR tablet        Sig: Take 1 tablet by mouth Every Morning        Sent to pharmacy as: Methylphenidate HCl ER 36 MG Oral Tablet Extended Release (Concerta)        Class: Normal        Earliest Fill Date: 4/12/2022        Route: Oral        E-Prescribing Status: Receipt confirmed by pharmacy (4/12/2022  4:34 PM CDT)          So with the above, confirmed information noted that Rx went  to [facility] pharm and called and canceled rx at [facility] pharmacy so pt will be able to get pt to advise and will resubmit to [facility]

## 2022-04-15 NOTE — TELEPHONE ENCOUNTER
Noted on rx    methylphenidate (Concerta) 36 MG CR tablet        Sig: Take 1 tablet by mouth Every Morning        Sent to pharmacy as: Methylphenidate HCl ER 36 MG Oral Tablet Extended Release (Concerta)        Class: Normal        Earliest Fill Date: 4/12/2022        Route: Oral        E-Prescribing Status: Receipt confirmed by pharmacy (4/12/2022  4:34 PM CDT)          So with the above, confirmed information noted that Rx went  to [facility] pharm and called and canceled rx at [facility] pharmacy so pt will be able to get pt to advise and will resubmit to [facility]

## 2022-05-16 DIAGNOSIS — R41.840 POOR CONCENTRATION: ICD-10-CM

## 2022-05-16 RX ORDER — METHYLPHENIDATE HYDROCHLORIDE 36 MG/1
36 TABLET ORAL EVERY MORNING
Qty: 30 TABLET | Refills: 0 | Status: SHIPPED | OUTPATIENT
Start: 2022-05-16 | End: 2022-05-19

## 2022-05-16 NOTE — TELEPHONE ENCOUNTER
----- Message from Selvin Garcia sent at 5/16/2022  6:29 AM CDT -----  Regarding: Concerta refill  It is time for concerta refill. [facility] is my preference. Thank you

## 2022-05-16 NOTE — TELEPHONE ENCOUNTER
Last filled 4/15/2022 Per Nikolay      Rx Refill Note  Requested Prescriptions     Pending Prescriptions Disp Refills   • methylphenidate (Concerta) 36 MG CR tablet 30 tablet 0     Sig: Take 1 tablet by mouth Every Morning      Last office visit with prescribing clinician: 2/10/2022      Next office visit with prescribing clinician: Visit date not found            Tina Laureano MA  05/16/22, 07:36 CDT

## 2022-05-19 ENCOUNTER — PATIENT MESSAGE (OUTPATIENT)
Dept: FAMILY MEDICINE CLINIC | Facility: CLINIC | Age: 57
End: 2022-05-19

## 2022-05-19 DIAGNOSIS — R41.840 POOR CONCENTRATION: ICD-10-CM

## 2022-05-19 RX ORDER — METHYLPHENIDATE HYDROCHLORIDE 36 MG/1
36 TABLET ORAL EVERY MORNING
Qty: 30 TABLET | Refills: 0 | Status: SHIPPED | OUTPATIENT
Start: 2022-05-19 | End: 2022-06-28 | Stop reason: SDUPTHER

## 2022-05-19 NOTE — TELEPHONE ENCOUNTER
This prescription printed out and needs to be E-rx, as there was no pharmacy selected with thr prescription was done on 5-16-22    Rx Refill Note  Requested Prescriptions     Pending Prescriptions Disp Refills   • methylphenidate (Concerta) 36 MG CR tablet 30 tablet 0     Sig: Take 1 tablet by mouth Every Morning      Last office visit with prescribing clinician: 2/10/2022      Next office visit with prescribing clinician: Visit date not found   {TIP  Encounters:23}         Reena Martin LPN  05/19/22, 08:08 CDT

## 2022-06-28 ENCOUNTER — PATIENT MESSAGE (OUTPATIENT)
Dept: FAMILY MEDICINE CLINIC | Facility: CLINIC | Age: 57
End: 2022-06-28

## 2022-06-28 DIAGNOSIS — R41.840 POOR CONCENTRATION: ICD-10-CM

## 2022-06-28 RX ORDER — METHYLPHENIDATE HYDROCHLORIDE 36 MG/1
36 TABLET ORAL EVERY MORNING
Qty: 30 TABLET | Refills: 0 | Status: SHIPPED | OUTPATIENT
Start: 2022-06-28 | End: 2022-08-16 | Stop reason: SDUPTHER

## 2022-06-28 NOTE — TELEPHONE ENCOUNTER
Protocol last refill 5-19-22    [place]  [facility]    Rx Refill Note  Requested Prescriptions     Pending Prescriptions Disp Refills   • methylphenidate (Concerta) 36 MG CR tablet 30 tablet 0     Sig: Take 1 tablet by mouth Every Morning      Last office visit with prescribing clinician: 2/10/2022      Next office visit with prescribing clinician: Visit date not found            Reena Martin LPN  06/28/22, 16:09 CDT

## 2022-07-13 ENCOUNTER — CLINICAL SUPPORT (OUTPATIENT)
Dept: FAMILY MEDICINE CLINIC | Facility: CLINIC | Age: 57
End: 2022-07-13

## 2022-07-13 VITALS
OXYGEN SATURATION: 99 % | DIASTOLIC BLOOD PRESSURE: 68 MMHG | BODY MASS INDEX: 28.7 KG/M2 | WEIGHT: 189.4 LBS | SYSTOLIC BLOOD PRESSURE: 124 MMHG | HEIGHT: 68 IN | TEMPERATURE: 97.3 F | HEART RATE: 62 BPM | RESPIRATION RATE: 18 BRPM

## 2022-07-13 DIAGNOSIS — Z02.89 ENCOUNTER FOR EXAMINATION REQUIRED BY DEPARTMENT OF TRANSPORTATION (DOT): Primary | ICD-10-CM

## 2022-07-13 LAB
BILIRUB BLD-MCNC: NEGATIVE MG/DL
CLARITY, POC: CLEAR
COLOR UR: YELLOW
GLUCOSE UR STRIP-MCNC: NEGATIVE MG/DL
KETONES UR QL: NEGATIVE
LEUKOCYTE EST, POC: NEGATIVE
NITRITE UR-MCNC: NEGATIVE MG/ML
PH UR: 7 [PH] (ref 5–8)
PROT UR STRIP-MCNC: NEGATIVE MG/DL
RBC # UR STRIP: NEGATIVE /UL
SP GR UR: 1 (ref 1–1.03)
UROBILINOGEN UR QL: NORMAL

## 2022-07-13 PROCEDURE — DOTPHY: Performed by: FAMILY MEDICINE

## 2022-07-13 PROCEDURE — 81002 URINALYSIS NONAUTO W/O SCOPE: CPT | Performed by: FAMILY MEDICINE

## 2022-07-13 NOTE — PROGRESS NOTES
Subjective   Selvin Garcia is a 56 y.o. male presenting with chief complaint of:   Chief Complaint   Patient presents with   • DOT Physical       History of Present Illness :  Alone.  Here for primarily an acute issue today; needs DOT exam.      Chronic/acute problems reviewed today:   1. Encounter for examination required by Department of Transportation (DOT)      Has an/another acute issue today: none.    The following portions of the patient's history were reviewed and updated as appropriate: allergies, current medications, past family history, past medical history, past social history, past surgical history and problem list.      Current Outpatient Medications:   •  albuterol sulfate HFA (ProAir HFA) 108 (90 Base) MCG/ACT inhaler, Inhale 2 puffs Every 4 (Four) Hours As Needed for Wheezing for up to 90 days., Disp: 25.5 g, Rfl: 3  •  azelastine (OPTIVAR) 0.05 % ophthalmic solution, Administer 1 drop to both eyes 2 (Two) Times a Day for 90 days., Disp: 3 each, Rfl: 3  •  cetirizine (zyrTEC) 10 MG tablet, Take 1 tablet by mouth Daily for 90 days., Disp: 90 tablet, Rfl: 3  •  fluticasone (FLONASE) 50 MCG/ACT nasal spray, 2 sprays into the nostril(s) as directed by provider Daily for 90 days., Disp: 48 g, Rfl: 3  •  methylphenidate (Concerta) 36 MG CR tablet, Take 1 tablet by mouth Every Morning, Disp: 30 tablet, Rfl: 0  •  montelukast (SINGULAIR) 10 MG tablet, Take 1 tablet by mouth Every Night., Disp: 90 tablet, Rfl: 3    No problems with medications.  Refills if needed done    No Known Allergies    See DOT form also  Review of Systems  GENERAL:  Active home and work but slower with limits, speed, stamina for age.   Sleep is ok/no apnea noted.   No fever now/recent.   SKIN: No rash/skin lesion of concern unless listed above/below.   ENDO:  No syncope, near or diaphoretic sweaty spells.  HEENT: No recent head injury; same occ headache.   No vision change.  No hearing loss.  Ears without pain/drainage.  No sore  throat.  No significant nasal/sinus congestion/drainage. No epistaxis.  CHEST: No chest wall tenderness or mass.  No cough, with wheeze.  No SOB; no hemoptysis.  CV: No chest pain, palpitations, ankle edema.  GI: No heartburn, dysphagia.  No abdominal pain, diarrhea, constipation.  No rectal bleeding, or melena.    :  Voids without dysuria, or incontinence to completion.  ORTHO: No painful/swollen joints but various on /off sore.  No sore neck or back.  No acute neck or back pain without recent injury.  NEURO: No dizziness, weakness of extremities.  No numbness/paresthesias.   PSYCH: No memory loss.  Mood ok on/off anxious, depressed but/and not suicidal.  Tries to tolerate stress.   Screening:  Mammogram: NA  Bone density: NA  Low dose CT chest:Tobacco-smoker/never NA  GI:   Colon/[place]/9.24.21  EGD-dilated stricture/[place]//2.15.21  Prostate: 7.13.22 deferred  8.10.21  7.12.19  Usual lab order  12m CBC, CMP, LIPID, TSH, PSAs, Vit D    Lab Results:  Results for orders placed or performed in visit on 07/13/22   POCT urinalysis dipstick, manual    Specimen: Urine   Result Value Ref Range    Color Yellow Yellow, Straw, Dark Yellow, Clarbiel    Clarity, UA Clear Clear    Glucose, UA Negative Negative mg/dL    Bilirubin Negative Negative    Ketones, UA Negative Negative    Specific Gravity  1.005 1.005 - 1.030    Blood, UA Negative Negative    pH, Urine 7.0 5.0 - 8.0    Protein, POC Negative Negative mg/dL    Urobilinogen, UA Normal Normal    Leukocytes Negative Negative    Nitrite, UA Negative Negative       A1C:  Lab Results - Last 18 Months   Lab Units 08/11/21  0726 03/24/21  0722   HEMOGLOBIN A1C % 4.50* 4.70*     PSA:  Lab Results - Last 18 Months   Lab Units 03/24/21  0722   PSA ng/mL 0.761     CBC:  Lab Results - Last 18 Months   Lab Units 10/23/21  1313 08/11/21  0726 03/24/21  0722   WBC 10*3/mm3 8.42 5.49 7.21   HEMOGLOBIN g/dL 15.8 16.2 15.8   HEMATOCRIT % 45.4 49.2 46.1   PLATELETS 10*3/mm3 287 308 362     "  BMP/CMP:  Lab Results - Last 18 Months   Lab Units 10/23/21  1313 08/11/21  0726 03/24/21  0722   SODIUM mmol/L 142 140 140   POTASSIUM mmol/L 4.2 4.8 4.6   CHLORIDE mmol/L 106 105 106   TOTAL CO2 mmol/L  --  25.9 26.4   CO2 mmol/L 30.0*  --   --    BUN mg/dL 17 15 20   CREATININE mg/dL 0.84 0.98 0.94   EGFR IF NONAFRICN AM mL/min/1.73 95 79 83   EGFR IF AFRICN AM mL/min/1.73  --  96 101   CALCIUM mg/dL 8.9 9.5 9.1     HEPATIC:  Lab Results - Last 18 Months   Lab Units 10/23/21  1313 08/11/21  0726 03/24/21  0722   ALT (SGPT) U/L 11 12 13   AST (SGOT) U/L 18 19 18   ALK PHOS U/L 77 79 73     THYROID:  Lab Results - Last 18 Months   Lab Units 03/24/21 0722   TSH uIU/mL 0.825       Objective   /68 (BP Location: Left arm, Patient Position: Sitting, Cuff Size: Large Adult)   Pulse 62   Temp 97.3 °F (36.3 °C) (Infrared)   Resp 18   Ht 172.7 cm (68\")   Wt 85.9 kg (189 lb 6.4 oz)   SpO2 99%   BMI 28.80 kg/m²   Body mass index is 28.8 kg/m².    See DOT form also  Physical Exam  GENERAL:  Well nourished/developed in no acute distress.   SKIN: Turgor excellent, without wound, rash, lesion.  HEENT: Normal cephalic without trauma.  Pupils equal round reactive to light. Extraocular motions full without nystagmus. Fields 85 deg/confrontation.   No thyromegaly, mass, tenderness, lymphadenopathy and supple.  CV: Regular rhythm.  No murmur, gallop, edema.  CHEST: No chest wall tenderness or mass.  No axillary tenderness/mass.   LUNGS: Symmetric motion with clear to auscultation.   ABD: Soft, nontender without mass.   ORTHO: Symmetric extremities without swelling/point tenderness.  Full gross range of motion.  NEURO: CN 2-12 grossly intact.  Symmetric facies and UE/LE. 3-4/5 strength throughout.  Nonfocal use extremities. Speech clear.  Finger to nose intact.   PSYCH: Oriented x 3.  Pleasant calm, well kept.  Purposeful/directed conservation with intact short/long gross memory    Assessment & Plan     1. Encounter for " examination required by Department of Transportation (DOT)      Rx: reviewed/changes:  No orders of the defined types were placed in this encounter.    LAB/Testing/Referrals: reviewed/orders:   Today: UA for DOT  Orders Placed This Encounter   Procedures   • POCT urinalysis dipstick, manual     Discussions:   Continue chronic care; return 6m  Body mass index is 28.8 kg/m².   BMI is >= 25 and <30. (Overweight) The following options were offered after discussion;: exercise counseling/recommendations and nutrition counseling/recommendations  Non-smoker  Selvin Garcia  reports that he has never smoked. He has never used smokeless tobacco..     There are no Patient Instructions on file for this visit.    Follow up: No follow-ups on file.  Future Appointments   Date Time Provider Department Center   7/14/2022  3:00 PM Oscar Urrutia MD [facility]

## 2022-07-14 ENCOUNTER — OFFICE VISIT (OUTPATIENT)
Dept: OTOLARYNGOLOGY | Facility: CLINIC | Age: 57
End: 2022-07-14

## 2022-07-14 ENCOUNTER — TELEPHONE (OUTPATIENT)
Dept: FAMILY MEDICINE CLINIC | Facility: CLINIC | Age: 57
End: 2022-07-14

## 2022-07-14 VITALS
SYSTOLIC BLOOD PRESSURE: 146 MMHG | TEMPERATURE: 98.4 F | HEIGHT: 68 IN | DIASTOLIC BLOOD PRESSURE: 86 MMHG | BODY MASS INDEX: 28.67 KG/M2 | HEART RATE: 59 BPM | WEIGHT: 189.2 LBS

## 2022-07-14 DIAGNOSIS — H91.93 BILATERAL HEARING LOSS, UNSPECIFIED HEARING LOSS TYPE: ICD-10-CM

## 2022-07-14 DIAGNOSIS — H93.13 TINNITUS OF BOTH EARS: ICD-10-CM

## 2022-07-14 DIAGNOSIS — J30.2 SEASONAL ALLERGIC RHINITIS, UNSPECIFIED TRIGGER: ICD-10-CM

## 2022-07-14 DIAGNOSIS — J32.9 CHRONIC SINUSITIS, UNSPECIFIED LOCATION: Primary | ICD-10-CM

## 2022-07-14 PROCEDURE — 99213 OFFICE O/P EST LOW 20 MIN: CPT | Performed by: OTOLARYNGOLOGY

## 2022-07-14 NOTE — TELEPHONE ENCOUNTER
Message Dr Urrutia  See if patient intrested in Rx?     Gus Rolle, I saw him regarding his allergies and he has been having a lot of difficulty with tinnitus and hearing loss.  We will set him up for an audiogram but I feel like this is affecting him enough for me to look into medication for depression/anxiety and he is in agreement that he would like to look into this.  Could you please evaluate him at some point in time to get him on something?  Her study showed that antidepression/antianxiety medications do help with the severity of tinnitus

## 2022-07-15 NOTE — PROGRESS NOTES
JANI Urrutia MD  [facility] EAR NOSE & THROAT  [address], [address]  [place] [address]  Dept: [phone]  Dept Fax: [phone]  Loc: [phone]       Visit Type: FOLLOW UP   Chief Complaint   Patient presents with   • Follow-up     3 month recheck, sinusitis        HPI  Selvin Garcia is a 56 y.o. male. He presents to the clinic today to discuss health status and any new health concerns.     The patient has been receiving ongoing care for sinus and ear concerns for several years. The patient has previously had sinus surgery on 07/26/2016 to open the cheek, ethmoid (bilateral), frontal, and back sinuses. The patient reportedly had a septoplasty performed 8 years prior to the sinus surgery, in year 2008. Despite these treatment efforts, the patient reports that he occasionally still experiences sinus issues. He was last seen 04/14/2022 for sinus issues which have shown improvement with antibiotics. He was prescribed allergy medications that included eyedrops, Zyrtec, Flonase, and Singulair. To date, he admits to experiencing fewer sinus symptoms. The patient reports that the middle of summer months are not the most troublesome time for sinus flare-ups, so he is unsure if the relief is due to medications or this time of the year.     Allergy testing was completed in 2015. There were no significant allergens detected.     The patient reports current concern of tinnitus. He states that the ringing of the ear is constant and worsens when he is in a silent environment. The patient admits that the ringing of the ear interferes with his hearing and understanding of others. He reports having a familial history of hearing difficulties. He states that his father and his brothers have had to wear hearing aids for a short period of time. He admits that the constant tinnitus has a slightly negative psychiatric effect.  He has been taking  Lipo-Flavonoid to help relieve ear ringing, however this medication has not provided any relief. He denies being on any anti-depressives. He admits trying musician ear plugs, for attending concerts, and has noticed a mild improvement.      Past Medical History:   Diagnosis Date   • Abnormal CT of paranasal sinuses 08/20/2015    (Note 1 of 3)  NASAL SEPTUM:  The nasal septum is relatively midline.  MAXILLARY SINUSES:  The left maxillary sinus showed >5 m mucosal thickening and 50% opacification and the right maxillary sinus showed >5 mm mucosal thickening. The osteomeatal complex is obstructed bilaterally.   • Abnormal CT of paranasal sinuses 08/20/2015    (Note 2 of 3)  NASAL TURBINATES: The inferior turbinates showed bilateral hypertrophy. The Cherie bullosa are not present.  ETHMOID SINUSES: The left ethmoid sinus showed >3 mm mucosal thickening and complete opacification. The lamina papyracea appears intact bilaterally.   • Abnormal CT of paranasal sinuses 08/20/2015    (Note 3 of 3)  FRONTAL SINUSES:  The left frontal sinus showed complete opacification. The right frontal sinus showed complete opacification. SPENOID SINUSES: The left sphenoid sinus showed moderate mucosal thickening. The right sphenoid sinus showed moderate mucosal thickening.   • Allergic rhinitis 12/01/2015   • Chronic rhinitis    • Chronic sinusitis    • Disturbances of sensation of smell and taste 12/01/2015   • Diverticulosis    • History of adenomatous polyp of colon    • History of colon polyps    • Sensorineural hearing loss, bilateral 12/01/2015   • Subjective tinnitus 12/01/2015       Past Surgical History:   Procedure Laterality Date   • COLONOSCOPY  12/09/2013    Dr. Chavez-Polyp; Repeat 5 years (Abstracted from PCP's note)   • COLONOSCOPY N/A 9/24/2021    One 6mm tubular adenomatous polyp in the ascending colon-Resected and retrieved-Clip (MR conditional) was placed; Diverticulosis in the left colon; Non-bleeding internal  hemorrhoids; Repeat 5 years    • ENDOSCOPY  08/14/2015    Dr. Mullins-Gastric ulcer; Repeat 8 weeks (Abstracted from PCP's note)   • ENDOSCOPY  10/16/2015    Dr. Mullins-Dr. Mullins (Abstracted from PCP's note)   • ENDOSCOPY N/A 10/15/2021    Procedure: ESOPHAGOGASTRODUODENOSCOPY WITH ANESTHESIA;  Surgeon: Isaura Fragoso MD;  [facility] ENDOSCOPY;  Service: Gastroenterology;  Laterality: N/A;  pre screen  post balloon  Dr. Pedersen   • FRACTURE SURGERY     • FUNCTIONAL ENDOSCOPIC SINUS SURGERY  07/26/2016   • HEMORRHOIDECTOMY     • KNEE ARTHROPLASTY, PARTIAL REPLACEMENT Left    • KNEE MENISCECTOMY Right     X 2   • OTHER SURGICAL HISTORY      Open Reduction-Internal Fixation   • ROTATOR CUFF REPAIR Right    • SEPTOPLASTY      Per Dr. Urrutia 7-8 years ago       Family History: His family history includes Diabetes in an other family member; No Known Problems in his father and mother.     Social History: He  reports that he has never smoked. He has never used smokeless tobacco. He reports current alcohol use. He reports that he does not use drugs.    Home Medications:  cetirizine, fluticasone, guaiFENesin-codeine, methylphenidate, and montelukast    Allergies:  He has No Known Allergies.       Vital Signs:   Temp:  [98.4 °F (36.9 °C)] 98.4 °F (36.9 °C)  Heart Rate:  [59] 59  BP: (146)/(86) 146/86  ENT Physical Exam  Constitutional  Appearance: patient appears well-developed and well-nourished,  Communication/Voice: communication appropriate for developmental age; vocal quality normal;  Head and Face  Appearance: head appears normal, face appears normal and face appears atraumatic;  Palpation: facial palpation normal;  Salivary: glands normal;  Ear  Hearing: intact;  Auricles: right auricle normal; left auricle normal;  External Mastoids: right external mastoid normal; left external mastoid normal;  Ear Canals: bilateral ear canals normal;  Tympanic Membranes: bilateral tympanic membranes normal;  Nose  External Nose:  nares patent bilaterally; external nose normal;  Internal Nose: bilateral intranasal mucosa edematous; bilateral inferior turbinates edematous;  Oral Cavity/Oropharynx  Lips: normal;  Neck  Neck: neck normal;  Respiratory  Inspection: breathing unlabored; normal breathing rate;  Cardiovascular  Inspection: extremities are warm and well perfused; no peripheral edema present;  Neurovestibular  Mental Status: alert and oriented;  Psychiatric: mood normal; affect is appropriate;         Result Review    RESULTS REVIEW    Old records were reviewed including old allergy testing    Assessment & Plan    Diagnoses and all orders for this visit:    1. Chronic sinusitis, unspecified location (Primary)    2. Tinnitus of both ears         -The patient will complete a hearing test. MD Candace will be contacted in regards to exploring antidepressants as a treatment option. Further treatment will be dependent on hearing test results.  -     Comprehensive Hearing Test; Future  -     Tympanometry; Future    3. Seasonal allergic rhinitis, unspecified trigger  Assessment & Plan:  -The patient will continue current allergy medication regimen. Allergy retesting will be considered if current medication regimen fails to alleviate symptoms or if no improvement of symptoms are made.      4. Bilateral hearing loss, unspecified hearing loss type  -     Comprehensive Hearing Test; Future  -     Tympanometry; Future              Transcribed from ambient dictation for Oscar Urrutia MD by Iliana Kemp.  07/15/22   08:31 CDT    Patient verbalized consent to the visit recording.  I have personally performed the services described in this document as transcribed by the above individual, and it is both accurate and complete.  Oscar Urrutia MD  7/16/2022  14:22 CDT    Electronically signed by Oscar Urrutia MD, 07/16/22, 2:23 PM CDT.

## 2022-07-15 NOTE — ASSESSMENT & PLAN NOTE
-The patient will continue current allergy medication regimen. Allergy retesting will be considered if current medication regimen fails to alleviate symptoms or if no improvement of symptoms are made.

## 2022-07-22 NOTE — TELEPHONE ENCOUNTER
"Called pt and advised and is willing to try something, anything \"it is so loud it is getting hard for me to hear people\"    Advised will send message to dr Pedersen and will call back on Monday, pt is agreeable  "

## 2022-07-25 RX ORDER — CITALOPRAM 20 MG/1
20 TABLET ORAL DAILY
Qty: 90 TABLET | Refills: 0 | Status: SHIPPED | OUTPATIENT
Start: 2022-07-25 | End: 2022-08-18

## 2022-07-25 NOTE — TELEPHONE ENCOUNTER
celexa 20 mg one a day  Does he have apt with resser before 1.12.23; if not I suggest if this does not help the tinnitis he call and get one sooner and obviously will be on celexa which they requested  Apt Dr Pedersen 3-4w; if this Rx is to be continued      Current Outpatient Medications:   •  cetirizine (zyrTEC) 10 MG tablet, Take 1 tablet by mouth Daily for 90 days., Disp: 90 tablet, Rfl: 3  •  fluticasone (FLONASE) 50 MCG/ACT nasal spray, 2 sprays into the nostril(s) as directed by provider Daily for 90 days., Disp: 48 g, Rfl: 3  •  guaiFENesin-codeine (GUAIFENESIN AC) 100-10 MG/5ML liquid, Take 5 mL by mouth 3 (Three) Times a Day As Needed for Cough., Disp: 118 mL, Rfl: 0  •  methylphenidate (Concerta) 36 MG CR tablet, Take 1 tablet by mouth Every Morning, Disp: 30 tablet, Rfl: 0  •  montelukast (SINGULAIR) 10 MG tablet, Take 1 tablet by mouth Every Night., Disp: 90 tablet, Rfl: 3

## 2022-08-03 RX ORDER — CELECOXIB 200 MG/1
CAPSULE ORAL
Qty: 90 CAPSULE | Refills: 3 | OUTPATIENT
Start: 2022-08-03

## 2022-08-03 NOTE — TELEPHONE ENCOUNTER
"Note on rx   \"The original prescription was discontinued on 10/15/2021 by Patricia Gan RN for the following reason: *Therapy completed. Renewing this prescription may not be appropriate\"    Called pt and advised he is not taking it any longer, refused rx   "

## 2022-08-16 ENCOUNTER — PATIENT MESSAGE (OUTPATIENT)
Dept: FAMILY MEDICINE CLINIC | Facility: CLINIC | Age: 57
End: 2022-08-16

## 2022-08-16 DIAGNOSIS — R41.840 POOR CONCENTRATION: ICD-10-CM

## 2022-08-16 DIAGNOSIS — R05.9 COUGH: ICD-10-CM

## 2022-08-16 DIAGNOSIS — J40 BRONCHITIS: ICD-10-CM

## 2022-08-16 DIAGNOSIS — J45.902 ASTHMA WITH STATUS ASTHMATICUS, UNSPECIFIED ASTHMA SEVERITY, UNSPECIFIED WHETHER PERSISTENT: Primary | ICD-10-CM

## 2022-08-16 RX ORDER — METHYLPHENIDATE HYDROCHLORIDE 36 MG/1
36 TABLET ORAL EVERY MORNING
Qty: 30 TABLET | Refills: 0 | Status: CANCELLED | OUTPATIENT
Start: 2022-08-16

## 2022-08-16 RX ORDER — METHYLPHENIDATE HYDROCHLORIDE 36 MG/1
36 TABLET ORAL EVERY MORNING
Qty: 30 TABLET | Refills: 0 | Status: SHIPPED | OUTPATIENT
Start: 2022-08-16 | End: 2022-09-19 | Stop reason: SDUPTHER

## 2022-08-16 RX ORDER — ALBUTEROL SULFATE 90 UG/1
2 AEROSOL, METERED RESPIRATORY (INHALATION) EVERY 4 HOURS PRN
Qty: 25.5 G | Refills: 3 | Status: SHIPPED | OUTPATIENT
Start: 2022-08-16

## 2022-08-16 NOTE — TELEPHONE ENCOUNTER
Protocol last refill 6-28-22    [place]  [facility]    Rx Refill Note  Requested Prescriptions     Pending Prescriptions Disp Refills   • methylphenidate (Concerta) 36 MG CR tablet 30 tablet 0     Sig: Take 1 tablet by mouth Every Morning   • albuterol sulfate HFA (ProAir HFA) 108 (90 Base) MCG/ACT inhaler 25.5 g 3     Sig: Inhale 2 puffs Every 4 (Four) Hours As Needed for Wheezing.   • Beclomethasone Diprop HFA (QVAR Redihaler) 80 MCG/ACT inhaler 31.8 g 3     Sig: Inhale 1 puff 2 (Two) Times a Day.      Last office visit with prescribing clinician: 2/10/2022      Next office visit with prescribing clinician: 8/18/2022            Reena Martin LPN  08/16/22, 14:25 CDT

## 2022-08-18 ENCOUNTER — OFFICE VISIT (OUTPATIENT)
Dept: FAMILY MEDICINE CLINIC | Facility: CLINIC | Age: 57
End: 2022-08-18

## 2022-08-18 VITALS
HEIGHT: 68 IN | WEIGHT: 185 LBS | DIASTOLIC BLOOD PRESSURE: 82 MMHG | SYSTOLIC BLOOD PRESSURE: 138 MMHG | HEART RATE: 60 BPM | TEMPERATURE: 97.7 F | RESPIRATION RATE: 18 BRPM | BODY MASS INDEX: 28.04 KG/M2 | OXYGEN SATURATION: 99 %

## 2022-08-18 DIAGNOSIS — R41.840 POOR CONCENTRATION: ICD-10-CM

## 2022-08-18 DIAGNOSIS — M15.0 PRIMARY GENERALIZED (OSTEO)ARTHRITIS: ICD-10-CM

## 2022-08-18 DIAGNOSIS — F32.A DEPRESSION, UNSPECIFIED DEPRESSION TYPE: Chronic | ICD-10-CM

## 2022-08-18 DIAGNOSIS — R73.01 ELEVATED FASTING GLUCOSE: ICD-10-CM

## 2022-08-18 DIAGNOSIS — Z12.5 ENCOUNTER FOR PROSTATE CANCER SCREENING: Primary | ICD-10-CM

## 2022-08-18 DIAGNOSIS — N40.0 PROSTATISM: Chronic | ICD-10-CM

## 2022-08-18 DIAGNOSIS — G47.00 INSOMNIA, UNSPECIFIED TYPE: ICD-10-CM

## 2022-08-18 DIAGNOSIS — F41.9 ANXIETY: Chronic | ICD-10-CM

## 2022-08-18 DIAGNOSIS — R74.8 ABNORMAL LIVER ENZYMES: ICD-10-CM

## 2022-08-18 DIAGNOSIS — J30.2 SEASONAL ALLERGIC RHINITIS, UNSPECIFIED TRIGGER: ICD-10-CM

## 2022-08-18 DIAGNOSIS — J45.902 ASTHMA WITH STATUS ASTHMATICUS, UNSPECIFIED ASTHMA SEVERITY, UNSPECIFIED WHETHER PERSISTENT: ICD-10-CM

## 2022-08-18 DIAGNOSIS — H93.19 TINNITUS, UNSPECIFIED LATERALITY: ICD-10-CM

## 2022-08-18 PROCEDURE — 99214 OFFICE O/P EST MOD 30 MIN: CPT | Performed by: FAMILY MEDICINE

## 2022-08-18 RX ORDER — CITALOPRAM 20 MG/1
40 TABLET ORAL DAILY
Qty: 90 TABLET | Refills: 0 | Status: SHIPPED
Start: 2022-08-18 | End: 2023-01-12

## 2022-08-18 NOTE — PROGRESS NOTES
Subjective   Selvin Garcia is a 56 y.o. male presenting with chief complaint of:   Chief Complaint   Patient presents with   • medication follow up     7.14.22 [facility]  2. Tinnitus of both ears         -The patient will complete a hearing test. MD Candace will be contacted in regards to exploring antidepressants as a treatment option. Further treatment will be dependent on hearing test results.  -     Comprehensive Hearing Test; Future  -     Tympanometry; Future      History of Present Illness :  Alone.   Here for review of chronic problems that includes elevated fasting glucose and others.     Has multiple chronic problems to consider that might have a bearing on today's issues;  an interval appointment.       Chronic/acute problems reviewed today:   1. Depression, unspecified depression type chornic variable significant  mood swings, down moods, nervousness, difficulty with concentration to function home/work.  Others close have not been concerned.  No suicide ideation/intent.  Rx has helped.       2. Anxiety Chronic/variable alittle worse.   On/off anxiety tolerated somewhat.  Rx helps and interesteed in Rx change. Stress ongoing home/marriage.      3. Primary generalized (osteo)arthritis Chronic/stable.  Various on/off joint pains/soreness/stiffness.  Particular joint problems with various.  No joint swelling.  Treats mainly with reduced activity, Rx listed, Tylenol. Active NSAIDs, and no injections.      4. Poor concentration chronic concentration improved with Concerta.  No obvious side effects of palpitations   5. Insomnia, unspecified type chronic difficulty falling asleep with equivocal sleep study in the past and no feeling that he has sleep apnea   his wife is probably going to file for divorce tomorrow; the stress is causing him to have some insomnia   6. Elevated fasting glucose Chronic/stable.  No problem/pattern hypoglycemia/hyperglycemia manifest by poly- dypsia, phagia, uria, or sweats,  diaphoretic episodes, syncope/near.     7. Seasonal allergic rhinitis, unspecified trigger Chronic/variable; seasonally.   On/off eye, sinus, nasal congestion and drainage.  Rx helps.  Aware of options additional medications, testing and not interested.     8. Abnormal liver enzymes-watching chronic mild elevation of liver enzymes closely being followed with labs and ultrasound   9. Prostatism Chronic/stable.  Slower but tolerated stream with complete emptying.  Nocturia on occ; tolerated.  No desire to persure evaluations/surgery.      10. Asthma with status asthmaticus, unspecified asthma severity, unspecified whether persistent Chronic/stable: infrequent cough/wheeze and need for rescue medications > 2/week.    Maintance Rx compliance and not needed.      11. Tinnitus, unspecified laterality chronic persistent tinnitus; ENT suggested he get with us can consider an antidepressant.  Already on Celexa 20     Has an/another acute issue today: none.    The following portions of the patient's history were reviewed and updated as appropriate: allergies, current medications, past family history, past medical history, past social history, past surgical history and problem list.    Current Outpatient Medications:   •  albuterol sulfate HFA (ProAir HFA) 108 (90 Base) MCG/ACT inhaler, Inhale 2 puffs Every 4 (Four) Hours As Needed for Wheezing., Disp: 25.5 g, Rfl: 3  •  Beclomethasone Diprop HFA (QVAR Redihaler) 80 MCG/ACT inhaler, Inhale 1 puff 2 (Two) Times a Day., Disp: 31.8 g, Rfl: 3  •  citalopram (CeleXA) 20 MG tablet, Take 1 tablet by mouth Daily., Disp: 90 tablet, Rfl: 0  •  methylphenidate (Concerta) 36 MG CR tablet, Take 1 tablet by mouth Every Morning, Disp: 30 tablet, Rfl: 0  •  montelukast (SINGULAIR) 10 MG tablet, Take 1 tablet by mouth Every Night., Disp: 90 tablet, Rfl: 3  •  cetirizine (zyrTEC) 10 MG tablet, Take 1 tablet by mouth Daily for 90 days., Disp: 90 tablet, Rfl: 3  •  fluticasone (FLONASE) 50  MCG/ACT nasal spray, 2 sprays into the nostril(s) as directed by provider Daily for 90 days., Disp: 48 g, Rfl: 3  •  guaiFENesin-codeine (GUAIFENESIN AC) 100-10 MG/5ML liquid, Take 5 mL by mouth 3 (Three) Times a Day As Needed for Cough., Disp: 118 mL, Rfl: 0    No problems with medications.    No Known Allergies    Review of Systems  GENERAL:  Active home and work but slower with limits, speed, stamina for age.   Sleep is stress related variable/no apnea noted.   No fever now/recent.   SKIN: No rash/skin lesion of concern unless listed above/below.   ENDO:  No syncope, near or diaphoretic sweaty spells.  HEENT: No recent head injury; same occ headache.   No vision change.  No hearing loss.  Ears without pain/drainage.  No sore throat.  No significant nasal/sinus congestion/drainage. No epistaxis.  CHEST: No chest wall tenderness or mass.  No cough, with wheeze.  No SOB; no hemoptysis.  CV: No chest pain, palpitations, ankle edema.  GI: No heartburn, dysphagia.  No abdominal pain, diarrhea, constipation.  No rectal bleeding, or melena.    :  Voids without dysuria, or incontinence to completion.  ORTHO: No painful/swollen joints but various on /off sore.  No sore neck or back.  No acute neck or back pain without recent injury.  NEURO: No dizziness, weakness of extremities.  No numbness/paresthesias.   PSYCH: No memory loss.  Mood ok on/off anxious, depressed but/and not suicidal.  Tries to tolerate stress.   Screening:  Mammogram: NA  Bone density: NA  Low dose CT chest:Tobacco-smoker/never NA  GI:   Colon/[place]/9.24.21  EGD-dilated stricture/[place]/[facility]/2.15.21  Prostate: 8.18.22  7.13.22 deferred  8.10.21  7.12.19  Usual lab order  12m CBC, CMP, LIPID, TSH, PSAs, Vit D    Copy/paste function used for ROS/exam AND each area of these were reviewed, updated, confirmed and supplemented as needed.   Data reviewed:   Recent admit/ER/MD visits: [facility] visit/last ro visit  Last cardiac testing:   Echo: none    Radiology  considered:   No radiology results for the last 90 days.     4.5.22 CXR  IMPRESSION:  1. No radiographic evidence of acute cardiopulmonary process.    Lab Results:  Results for orders placed or performed in visit on 07/13/22   POCT urinalysis dipstick, manual    Specimen: Urine   Result Value Ref Range    Color Yellow Yellow, Straw, Dark Yellow, Claribel    Clarity, UA Clear Clear    Glucose, UA Negative Negative mg/dL    Bilirubin Negative Negative    Ketones, UA Negative Negative    Specific Gravity  1.005 1.005 - 1.030    Blood, UA Negative Negative    pH, Urine 7.0 5.0 - 8.0    Protein, POC Negative Negative mg/dL    Urobilinogen, UA Normal Normal    Leukocytes Negative Negative    Nitrite, UA Negative Negative       A1C:  Lab Results - Last 18 Months   Lab Units 08/11/21  0726 03/24/21  0722   HEMOGLOBIN A1C % 4.50* 4.70*     GLUCOSE:  Lab Results - Last 18 Months   Lab Units 10/23/21  1313 08/11/21  0726 03/24/21  0722   GLUCOSE mg/dL 116* 90 110*     LIPID:  Lab Results - Last 18 Months   Lab Units 03/24/21  0722   CHOLESTEROL mg/dL 194   LDL CHOL mg/dL 129*   HDL CHOL mg/dL 56   TRIGLYCERIDES mg/dL 47     PSA:  Lab Results - Last 18 Months   Lab Units 03/24/21  0722   PSA ng/mL 0.761       CBC:  Lab Results - Last 18 Months   Lab Units 10/23/21  1313 08/11/21  0726 03/24/21  0722   WBC 10*3/mm3 8.42 5.49 7.21   HEMOGLOBIN g/dL 15.8 16.2 15.8   HEMATOCRIT % 45.4 49.2 46.1   PLATELETS 10*3/mm3 287 308 362      BMP/CMP:  Lab Results - Last 18 Months   Lab Units 10/23/21  1313 08/11/21  0726 03/24/21  0722   SODIUM mmol/L 142 140 140   POTASSIUM mmol/L 4.2 4.8 4.6   CHLORIDE mmol/L 106 105 106   TOTAL CO2 mmol/L  --  25.9 26.4   CO2 mmol/L 30.0*  --   --    GLUCOSE mg/dL 116* 90 110*   BUN mg/dL 17 15 20   CREATININE mg/dL 0.84 0.98 0.94   EGFR IF NONAFRICN AM mL/min/1.73 95 79 83   EGFR IF AFRICN AM mL/min/1.73  --  96 101   CALCIUM mg/dL 8.9 9.5 9.1     HEPATIC:  Lab Results - Last 18 Months   Lab Units  "10/23/21  1313 08/11/21  0726 03/24/21  0722   ALT (SGPT) U/L 11 12 13   AST (SGOT) U/L 18 19 18   ALK PHOS U/L 77 79 73     Vit D:  Lab Results - Last 18 Months   Lab Units 03/24/21  0722   VIT D 25 HYDROXY ng/ml 37.4     THYROID:  Lab Results - Last 18 Months   Lab Units 03/24/21  0722   TSH uIU/mL 0.825       Objective   /82 (BP Location: Left arm, Patient Position: Sitting, Cuff Size: Adult)   Pulse 60   Temp 97.7 °F (36.5 °C) (Infrared)   Resp 18   Ht 172.7 cm (68\")   Wt 83.9 kg (185 lb)   SpO2 99%   BMI 28.13 kg/m²   Body mass index is 28.13 kg/m².    Recent Vitals       4/14/2022 7/13/2022 7/14/2022       BP: 142/95 124/68 146/86     Pulse: -- 62 59     Temp: 98.1 °F (36.7 °C) 97.3 °F (36.3 °C) 98.4 °F (36.9 °C)     Weight: 85.3 kg (188 lb) 85.9 kg (189 lb 6.4 oz) 85.8 kg (189 lb 3.2 oz)     BMI (Calculated): 28.6 28.8 28.8         .  Wt Readings from Last 15 Encounters:   08/18/22 1546 83.9 kg (185 lb)   07/14/22 1512 85.8 kg (189 lb 3.2 oz)   07/13/22 1423 85.9 kg (189 lb 6.4 oz)   04/14/22 1303 85.3 kg (188 lb)   03/10/22 1302 83.3 kg (183 lb 9.6 oz)   03/02/22 1620 84.8 kg (187 lb)   02/10/22 1114 83.5 kg (184 lb)   02/03/22 1520 75.3 kg (166 lb)   10/25/21 1549 85.3 kg (188 lb)   10/23/21 1230 77.1 kg (170 lb)   10/15/21 1028 81.6 kg (179 lb 12.8 oz)   10/13/21 1410 77.1 kg (170 lb)   09/24/21 0740 78 kg (172 lb)   09/20/21 1415 81.6 kg (180 lb)   08/25/21 1549 82.9 kg (182 lb 12.8 oz)       Physical Exam  GENERAL:  Well nourished/developed in no acute distress.   SKIN: Turgor excellent, without wound, rash, lesion.  HEENT: Normal cephalic without trauma.  Pupils equal round reactive to light. Extraocular motions full without nystagmus. Fields 85 deg/confrontation.   No thyromegaly, mass, tenderness, lymphadenopathy and supple.  CV: Regular rhythm.  No murmur, gallop,  edema.  CHEST: No chest wall tenderness or mass.  No axillary tenderness/mass.   LUNGS: Symmetric motion with clear to " auscultation.   ABD: Soft, nontender without mass.   PROSTATE: No visible/palpable lesion/tenderness and anal tone intact/full.  Prostate 20 ml/smooth and nontender.  No rectal mass within reach. Stool on glove brown.   ORTHO: Symmetric extremities without swelling/point tenderness.  Full gross range of motion.  NEURO: CN 2-12 grossly intact.  Symmetric facies and UE/LE. 3-4/5 strength throughout.  Nonfocal use extremities. Speech clear.  Finger to nose intact.   PSYCH: Oriented x 3.  Pleasant calm, well kept.  Purposeful/directed conservation with intact short/long gross memory    Assessment & Plan     1. Encounter for prostate cancer screening    2. Depression, unspecified depression type    3. Anxiety    4. Primary generalized (osteo)arthritis    5. Poor concentration    6. Insomnia, unspecified type    7. Elevated fasting glucose    8. Seasonal allergic rhinitis, unspecified trigger    9. Abnormal liver enzymes-watching    10. Prostatism    11. Asthma with status asthmaticus, unspecified asthma severity, unspecified whether persistent-ADULT    12. Tinnitus, unspecified laterality        Discussions/medical decisions/reviews:  BP ok  Other vitals ok  DM/BS 4.5 8.11.21  Lipid  3.24.21  PSA ok 3.24.21  CBC ok 10.23.21  Renal ok 10.23.21  Liver ok 10.23.21  Vit D ok 3.24.21  Thyroid ok 3.24.21    Fasting lab needed  Trial celexa 20-to 40    Medical decision issues:   Data review above:   Rx: reviewed and decisions:   Visit today involved chronic significant medical problems or differentials and/or intensive drug monitoring: ie potential to cause serious morbidity or death:   Rx changes:   New Medications Ordered This Visit   Medications   • citalopram (CeleXA) 20 MG tablet     Sig: Take 2 tablets by mouth Daily.     Dispense:  90 tablet     Refill:  0     Pt may want to get #30 day?   • Diclofenac Sodium (VOLTAREN) 1 % gel gel     Sig: Apply 4 g topically to the appropriate area as directed 4 (Four) Times a  Day As Needed (sore joint).     Orders placed:   LAB/Testing/Referrals: reviewed/orders:   Today:   Orders Placed This Encounter   Procedures   • Comprehensive metabolic panel   • Lipid Panel With LDL/HDL Ratio   • TSH   • Hemoglobin A1c   • PSA Screen   • CBC and Differential     Chronic/recurrent labs above or change to:   same     Screening reviewed/updated     Immunization History   Administered Date(s) Administered   • COVID-19 (MODERNA) 1st, 2nd, 3rd Dose Only 01/11/2021, 02/01/2021   • Flu Vaccine Intradermal Quad 18-64YR 01/14/2020   • FluLaval/Fluzone >6mos 11/01/2021   • flucelvax quad pfs =>4 YRS 01/14/2020     Vaccine reviewed: today none; later we advised/reaffirmed our support/suggestion for staying complete with covid- covid boosters, seasonal flu/yearly and any missing vaccine from list we supplied; we suggest contact with local health department office to review missing/needed vaccines and then bring nursing documentation for these vaccines to this office.     Health maintenance:   Body mass index is 28.13 kg/m².  BMI is >= 25 and <30. (Overweight) The following options were offered after discussion;: exercise counseling/recommendations and nutrition counseling/recommendations      Tobacco use reviewed:   Selvin Garcia  reports that he has never smoked. He has never used smokeless tobacco..    There are no Patient Instructions on file for this visit.    Follow up: Return for fasting lab when able; then Dr Pedersen 6m.  Future Appointments   Date Time Provider Department Center   1/12/2023  3:00 PM Madelyn Mcdonald AUD [facility]   1/12/2023  3:30 PM Oscar Urrutia MD [facility]

## 2022-09-02 ENCOUNTER — LAB (OUTPATIENT)
Dept: FAMILY MEDICINE CLINIC | Facility: CLINIC | Age: 57
End: 2022-09-02

## 2022-09-12 ENCOUNTER — TELEPHONE (OUTPATIENT)
Dept: FAMILY MEDICINE CLINIC | Facility: CLINIC | Age: 57
End: 2022-09-12

## 2022-09-12 NOTE — TELEPHONE ENCOUNTER
Regarding: Kelsy  ----- Message from Reena Martin LPN sent at 9/12/2022  9:16 AM CDT -----  Sending messages from spouse also     ----- Message from Selvin Garcia to Aquilino Pedersen MD sent at 9/9/2022  6:43 PM -----   Aquilino there has been a real crisis at our home this week. The police have been here multiple times and [facility] mental health has been here also. It has been said that she is suicidal and that yanira is being neglected here. He certainly is not being neglected here and any of us would do anything for him. He has had to experience more turmoil than any of us would like for sure. I do not believe that she is suicidal. She gets very upset  at times and has said things during those times that have caused great concern from us especially Shahram. I have been asked to send a message to you even though Aldo has been involved with this situation. Our family needs healing for sure as we all need each other.

## 2022-09-12 NOTE — TELEPHONE ENCOUNTER
Advise  lets see what her visit to [facility] today accomplishes    Willing to help; difficult to know first step based on what I know right now

## 2022-09-13 ENCOUNTER — OFFICE VISIT (OUTPATIENT)
Dept: FAMILY MEDICINE CLINIC | Facility: CLINIC | Age: 57
End: 2022-09-13

## 2022-09-13 VITALS
SYSTOLIC BLOOD PRESSURE: 118 MMHG | HEART RATE: 90 BPM | OXYGEN SATURATION: 96 % | BODY MASS INDEX: 27.31 KG/M2 | RESPIRATION RATE: 18 BRPM | WEIGHT: 180.2 LBS | HEIGHT: 68 IN | DIASTOLIC BLOOD PRESSURE: 68 MMHG | TEMPERATURE: 97.3 F

## 2022-09-13 DIAGNOSIS — Z63.8 FAMILY DISCORD: ICD-10-CM

## 2022-09-13 DIAGNOSIS — F41.9 ANXIETY: Chronic | ICD-10-CM

## 2022-09-13 DIAGNOSIS — F32.A DEPRESSION, UNSPECIFIED DEPRESSION TYPE: Chronic | ICD-10-CM

## 2022-09-13 PROCEDURE — 99213 OFFICE O/P EST LOW 20 MIN: CPT | Performed by: FAMILY MEDICINE

## 2022-09-13 RX ORDER — CELECOXIB 200 MG/1
200 CAPSULE ORAL DAILY
COMMUNITY
Start: 2022-08-18 | End: 2022-09-21

## 2022-09-13 RX ORDER — ALPRAZOLAM 0.5 MG/1
0.5 TABLET ORAL 2 TIMES DAILY PRN
Qty: 15 TABLET | Refills: 0 | Status: SHIPPED | OUTPATIENT
Start: 2022-09-13 | End: 2023-01-12

## 2022-09-13 RX ORDER — PANTOPRAZOLE SODIUM 40 MG/1
40 TABLET, DELAYED RELEASE ORAL DAILY
COMMUNITY
Start: 2022-08-18 | End: 2023-01-12

## 2022-09-13 SDOH — SOCIAL STABILITY - SOCIAL INSECURITY: OTHER SPECIFIED PROBLEMS RELATED TO PRIMARY SUPPORT GROUP: Z63.8

## 2022-09-13 NOTE — PROGRESS NOTES
Subjective   Selivn Garcia is a 56 y.o. male presenting with chief complaint of:   Chief Complaint   Patient presents with   • Anxiety   • Insomnia     History of Present Illness :  Alone.  Here for primarily an acute issue today; increased anxiety/stress.   On and off there is been issues dealing with stress.  Major issues in his life was the birth of his youngest son with developmental delays.  Raising that young man to childhood to his current adult age has caused a lot of family stress and 12.  In the [place] started having to difficulty with's substance abuse.  A couple years ago he and his wife  for the winter; since she has been back home its been up-and-down family strife and discord.  He has had to take off the last week of work and yesterday and today; as she had to go to [facility] yesterday and was admitted to the psych unit.  There is a chance that she has bipolar; her manic activities for the weekend were very controlling    Has few chronic problems to consider that might have a bearing on today's issues; not an interval appointment.       Chronic/acute problems reviewed today:   1. Anxiety    2. Depression, unspecified depression type    3. Family discord      Has an/another acute issue today: none.    The following portions of the patient's history were reviewed and updated as appropriate: allergies, current medications, past family history, past medical history, past social history, past surgical history and problem list.      Current Outpatient Medications:   •  citalopram (CeleXA) 20 MG tablet, Take 2 tablets by mouth Daily., Disp: 90 tablet, Rfl: 0  •  methylphenidate (Concerta) 36 MG CR tablet, Take 1 tablet by mouth Every Morning, Disp: 30 tablet, Rfl: 0  •  montelukast (SINGULAIR) 10 MG tablet, Take 1 tablet by mouth Every Night., Disp: 90 tablet, Rfl: 3  •  albuterol sulfate HFA (ProAir HFA) 108 (90 Base) MCG/ACT inhaler, Inhale 2 puffs Every 4 (Four) Hours As Needed for  Wheezing., Disp: 25.5 g, Rfl: 3  •  Beclomethasone Diprop HFA (QVAR Redihaler) 80 MCG/ACT inhaler, Inhale 1 puff 2 (Two) Times a Day., Disp: 31.8 g, Rfl: 3  •  celecoxib (CeleBREX) 200 MG capsule, Take 200 mg by mouth Daily., Disp: , Rfl:   •  cetirizine (zyrTEC) 10 MG tablet, Take 1 tablet by mouth Daily for 90 days., Disp: 90 tablet, Rfl: 3  •  Diclofenac Sodium (VOLTAREN) 1 % gel gel, Apply 4 g topically to the appropriate area as directed 4 (Four) Times a Day As Needed (sore joint)., Disp: , Rfl:   •  fluticasone (FLONASE) 50 MCG/ACT nasal spray, 2 sprays into the nostril(s) as directed by provider Daily for 90 days., Disp: 48 g, Rfl: 3  •  guaiFENesin-codeine (GUAIFENESIN AC) 100-10 MG/5ML liquid, Take 5 mL by mouth 3 (Three) Times a Day As Needed for Cough., Disp: 118 mL, Rfl: 0  •  pantoprazole (PROTONIX) 40 MG EC tablet, Take 40 mg by mouth Daily., Disp: , Rfl:     No problems with medications.    No Known Allergies    Review of Systems   Constitutional: Positive for activity change, appetite change and fatigue. Negative for fever.   Psychiatric/Behavioral: Positive for decreased concentration, dysphoric mood and sleep disturbance. Negative for confusion, self-injury and suicidal ideas (He is adamant that he is not suicidal). The patient is nervous/anxious.      Copy/paste function used for ROS/exam AND each area of these were reviewed, updated, confirmed and supplemented as needed.   Data reviewed:   Recent admit/ER/MD visits: none  Last cardiac testing:   Echo: none  Radiology considered:   none    Lab Results:  Results for orders placed or performed in visit on 08/19/22   Comprehensive metabolic panel    Specimen: Blood   Result Value Ref Range    Glucose 89 65 - 99 mg/dL    BUN 20 6 - 20 mg/dL    Creatinine 1.09 0.76 - 1.27 mg/dL    EGFR Result 79.2 >60.0 mL/min/1.73    BUN/Creatinine Ratio 18.3 7.0 - 25.0    Sodium 143 136 - 145 mmol/L    Potassium 4.6 3.5 - 5.2 mmol/L    Chloride 105 98 - 107 mmol/L     Total CO2 22.5 22.0 - 29.0 mmol/L    Calcium 9.1 8.6 - 10.5 mg/dL    Total Protein 6.2 6.0 - 8.5 g/dL    Albumin 4.30 3.50 - 5.20 g/dL    Globulin 1.9 gm/dL    A/G Ratio 2.3 g/dL    Total Bilirubin 0.6 0.0 - 1.2 mg/dL    Alkaline Phosphatase 88 39 - 117 U/L    AST (SGOT) 20 1 - 40 U/L    ALT (SGPT) 10 1 - 41 U/L   Lipid Panel With LDL/HDL Ratio    Specimen: Blood   Result Value Ref Range    Total Cholesterol 179 0 - 200 mg/dL    Triglycerides 67 0 - 150 mg/dL    HDL Cholesterol 50 40 - 60 mg/dL    VLDL Cholesterol [place] 13 5 - 40 mg/dL    LDL Chol Calc (NIH) 116 (H) 0 - 100 mg/dL    LDL/HDL RATIO 2.31    TSH    Specimen: Blood   Result Value Ref Range    TSH 1.420 0.270 - 4.200 uIU/mL   Hemoglobin A1c    Specimen: Blood   Result Value Ref Range    Hemoglobin A1C 4.60 (L) 4.80 - 5.60 %   PSA Screen    Specimen: Blood   Result Value Ref Range    PSA 0.814 0.000 - 4.000 ng/mL   CBC and Differential    Specimen: Blood   Result Value Ref Range    WBC 4.17 3.40 - 10.80 10*3/mm3    RBC 5.16 4.14 - 5.80 10*6/mm3    Hemoglobin 15.7 13.0 - 17.7 g/dL    Hematocrit 45.9 37.5 - 51.0 %    MCV 89.0 79.0 - 97.0 fL    MCH 30.4 26.6 - 33.0 pg    MCHC 34.2 31.5 - 35.7 g/dL    RDW 12.8 12.3 - 15.4 %    Platelets 278 140 - 450 10*3/mm3    Neutrophil Rel % 40.1 (L) 42.7 - 76.0 %    Lymphocyte Rel % 37.6 19.6 - 45.3 %    Monocyte Rel % 11.0 5.0 - 12.0 %    Eosinophil Rel % 9.8 (H) 0.3 - 6.2 %    Basophil Rel % 1.0 0.0 - 1.5 %    Neutrophils Absolute 1.67 (L) 1.70 - 7.00 10*3/mm3    Lymphocytes Absolute 1.57 0.70 - 3.10 10*3/mm3    Monocytes Absolute 0.46 0.10 - 0.90 10*3/mm3    Eosinophils Absolute 0.41 (H) 0.00 - 0.40 10*3/mm3    Basophils Absolute 0.04 0.00 - 0.20 10*3/mm3    Immature Granulocyte Rel % 0.5 0.0 - 0.5 %    Immature Grans Absolute 0.02 0.00 - 0.05 10*3/mm3    nRBC 0.0 0.0 - 0.2 /100 WBC       A1C:  Lab Results - Last 18 Months   Lab Units 09/02/22  0740 08/11/21  0726 03/24/21  0722   HEMOGLOBIN A1C % 4.60* 4.50* 4.70*  "    GLUCOSE:  Lab Results - Last 18 Months   Lab Units 09/02/22  0740 10/23/21  1313 08/11/21 0726 03/24/21 0722   GLUCOSE mg/dL 89 116* 90 110*     LIPID:  Lab Results - Last 18 Months   Lab Units 09/02/22  0740 03/24/21 0722   CHOLESTEROL mg/dL 179 194   LDL CHOL mg/dL 116* 129*   HDL CHOL mg/dL 50 56   TRIGLYCERIDES mg/dL 67 47     PSA:  Lab Results - Last 18 Months   Lab Units 09/02/22  0740 03/24/21 0722   PSA ng/mL 0.814 0.761     CBC:  Lab Results - Last 18 Months   Lab Units 09/02/22  0740 10/23/21  1313 08/11/21 0726 03/24/21 0722   WBC 10*3/mm3 4.17 8.42 5.49 7.21   HEMOGLOBIN g/dL 15.7 15.8 16.2 15.8   HEMATOCRIT % 45.9 45.4 49.2 46.1   PLATELETS 10*3/mm3 278 287 308 362      BMP/CMP:  Lab Results - Last 18 Months   Lab Units 09/02/22  0740 10/23/21  1313 08/11/21 0726 03/24/21 0722   SODIUM mmol/L 143 142 140 140   POTASSIUM mmol/L 4.6 4.2 4.8 4.6   CHLORIDE mmol/L 105 106 105 106   TOTAL CO2 mmol/L 22.5  --  25.9 26.4   CO2 mmol/L  --  30.0*  --   --    GLUCOSE mg/dL 89 116* 90 110*   BUN mg/dL 20 17 15 20   CREATININE mg/dL 1.09 0.84 0.98 0.94   EGFR IF NONAFRICN AM mL/min/1.73  --  95 79 83   EGFR IF AFRICN AM mL/min/1.73  --   --  96 101   EGFR RESULT mL/min/1.73 79.2  --   --   --    CALCIUM mg/dL 9.1 8.9 9.5 9.1     HEPATIC:  Lab Results - Last 18 Months   Lab Units 09/02/22  0740 10/23/21  1313 08/11/21 0726 03/24/21 0722   ALT (SGPT) U/L 10 11 12 13   AST (SGOT) U/L 20 18 19 18   ALK PHOS U/L 88 77 79 73     Vit D:  Lab Results - Last 18 Months   Lab Units 03/24/21  0722   VIT D 25 HYDROXY ng/ml 37.4     THYROID:  Lab Results - Last 18 Months   Lab Units 09/02/22  0740 03/24/21  0722   TSH uIU/mL 1.420 0.825     Objective   /68 (BP Location: Right arm, Patient Position: Sitting, Cuff Size: Adult)   Pulse 90   Temp 97.3 °F (36.3 °C) (Infrared)   Resp 18   Ht 172.7 cm (68\")   Wt 81.7 kg (180 lb 3.2 oz)   SpO2 96%   BMI 27.40 kg/m²   Body mass index is 27.4 kg/m².    Recent " Vitals       7/13/2022 7/14/2022 8/18/2022       BP: 124/68 146/86 138/82     Pulse: 62 59 60     Temp: 97.3 °F (36.3 °C) 98.4 °F (36.9 °C) 97.7 °F (36.5 °C)     Weight: 85.9 kg (189 lb 6.4 oz) 85.8 kg (189 lb 3.2 oz) 83.9 kg (185 lb)     BMI (Calculated): 28.8 28.8 28.1         Wt Readings from Last 15 Encounters:   09/13/22 0904 81.7 kg (180 lb 3.2 oz)   08/18/22 1546 83.9 kg (185 lb)   07/14/22 1512 85.8 kg (189 lb 3.2 oz)   07/13/22 1423 85.9 kg (189 lb 6.4 oz)   04/14/22 1303 85.3 kg (188 lb)   03/10/22 1302 83.3 kg (183 lb 9.6 oz)   03/02/22 1620 84.8 kg (187 lb)   02/10/22 1114 83.5 kg (184 lb)   02/03/22 1520 75.3 kg (166 lb)   10/25/21 1549 85.3 kg (188 lb)   10/23/21 1230 77.1 kg (170 lb)   10/15/21 1028 81.6 kg (179 lb 12.8 oz)   10/13/21 1410 77.1 kg (170 lb)   09/24/21 0740 78 kg (172 lb)   09/20/21 1415 81.6 kg (180 lb)       Physical Exam  Vitals and nursing note reviewed.   Constitutional:       General: He is not in acute distress.  Cardiovascular:      Pulses: Normal pulses.      Heart sounds: Normal heart sounds.   Pulmonary:      Effort: Pulmonary effort is normal.      Breath sounds: Normal breath sounds.   Neurological:      Mental Status: He is alert.   Psychiatric:         Behavior: Behavior normal.         Thought Content: Thought content normal.         Judgment: Judgment normal.       Assessment & Plan     1. Anxiety    2. Depression, unspecified depression type    3. Family discord      Discussions/medical decisions/reviews:  BP ok  Other vitals ok    Temporary use xanax to deal with worse episodes anxiety/and insomnia.   Supportive as we can of paperwork/etc to deal with work/etc  Advised he closely work with [facility]/psych at wife's discharge to be sure a good plan of action is in place  Controlled substance form completed    Medical decision issues:   Data review above:   Rx: reviewed and decisions:   Visit today involved chronic significant medical problems or differentials and/or intensive  drug monitoring: ie potential to cause serious morbidity or death:   Rx changes:   New Medications Ordered This Visit   Medications   • ALPRAZolam (Xanax) 0.5 MG tablet     Sig: Take 1 tablet by mouth 2 (Two) Times a Day As Needed for Anxiety.     Dispense:  15 tablet     Refill:  0       Orders placed:   LAB/Testing/Referrals: reviewed/orders:   Today:   No orders of the defined types were placed in this encounter.    Chronic/recurrent labs above or change to:   same       Immunization History   Administered Date(s) Administered   • COVID-19 (MODERNA) 1st, 2nd, 3rd Dose Only 01/11/2021, 02/01/2021   • Flu Vaccine Intradermal Quad 18-64YR 01/14/2020   • FluLaval/Fluzone >6mos 11/01/2021   • flucelvax quad pfs =>4 YRS 01/14/2020         There are no Patient Instructions on file for this visit.    Follow up: Return for controlled substance form; as planned or at least yearly.  Future Appointments   Date Time Provider Department Center   1/12/2023  3:00 PM Madelyn Mcdonald AUD [facility]   1/12/2023  3:30 PM Oscar Urrutia MD [facility]   6/13/2023  4:00 PM Aldo Wood APRN [facility]

## 2022-09-19 DIAGNOSIS — R41.840 POOR CONCENTRATION: ICD-10-CM

## 2022-09-20 RX ORDER — METHYLPHENIDATE HYDROCHLORIDE 36 MG/1
36 TABLET ORAL EVERY MORNING
Qty: 30 TABLET | Refills: 0 | Status: SHIPPED | OUTPATIENT
Start: 2022-09-20 | End: 2022-10-20 | Stop reason: SDUPTHER

## 2022-09-20 NOTE — TELEPHONE ENCOUNTER
Last refill 8-16-22    [facility]  [facility]    Rx Refill Note  Requested Prescriptions     Pending Prescriptions Disp Refills   • methylphenidate (Concerta) 36 MG CR tablet 30 tablet 0     Sig: Take 1 tablet by mouth Every Morning      Last office visit with prescribing clinician: 9/13/2022      Next office visit with prescribing clinician: Visit date not found            Reena Martin LPN  09/20/22, 12:13 CDT

## 2022-09-21 RX ORDER — CELECOXIB 200 MG/1
CAPSULE ORAL
Qty: 30 CAPSULE | Refills: 0 | Status: SHIPPED | OUTPATIENT
Start: 2022-09-21 | End: 2023-01-12

## 2022-09-21 NOTE — TELEPHONE ENCOUNTER
Rx Refill Note  Requested Prescriptions     Pending Prescriptions Disp Refills   • celecoxib (CeleBREX) 200 MG capsule [Pharmacy Med Name: CELECOXIB 200MG CAPSULES] 30 capsule 0     Sig: TAKE 1 CAPSULE BY MOUTH DAILY      Last office visit with prescribing clinician: 9/13/2022      Next office visit with prescribing clinician: Visit date not found            Reena Martin LPN  09/21/22, 11:19 CDT

## 2022-10-20 ENCOUNTER — PATIENT MESSAGE (OUTPATIENT)
Dept: FAMILY MEDICINE CLINIC | Facility: CLINIC | Age: 57
End: 2022-10-20

## 2022-10-20 DIAGNOSIS — R41.840 POOR CONCENTRATION: ICD-10-CM

## 2022-10-20 RX ORDER — METHYLPHENIDATE HYDROCHLORIDE 36 MG/1
36 TABLET ORAL EVERY MORNING
Qty: 30 TABLET | Refills: 0 | Status: SHIPPED | OUTPATIENT
Start: 2022-10-20 | End: 2022-12-08 | Stop reason: SDUPTHER

## 2022-10-20 NOTE — TELEPHONE ENCOUNTER
Protocol last refill 9-20-22    [facility]  wnl      Rx Refill Note  Requested Prescriptions     Pending Prescriptions Disp Refills   • methylphenidate (Concerta) 36 MG CR tablet 30 tablet 0     Sig: Take 1 tablet by mouth Every Morning      Last office visit with prescribing clinician: 9/13/2022      Next office visit with prescribing clinician: Visit date not found            Reena Martin LPN  10/20/22, 14:52 CDT

## 2022-12-08 ENCOUNTER — PATIENT MESSAGE (OUTPATIENT)
Dept: FAMILY MEDICINE CLINIC | Facility: CLINIC | Age: 57
End: 2022-12-08

## 2022-12-08 DIAGNOSIS — R41.840 POOR CONCENTRATION: ICD-10-CM

## 2022-12-08 RX ORDER — METHYLPHENIDATE HYDROCHLORIDE 36 MG/1
36 TABLET ORAL EVERY MORNING
Qty: 30 TABLET | Refills: 0 | Status: SHIPPED | OUTPATIENT
Start: 2022-12-08 | End: 2023-01-12

## 2022-12-08 NOTE — TELEPHONE ENCOUNTER
Last refill 10-20-22    [place]  wnl    Rx Refill Note  Requested Prescriptions     Pending Prescriptions Disp Refills   • methylphenidate (Concerta) 36 MG CR tablet 30 tablet 0     Sig: Take 1 tablet by mouth Every Morning      Last office visit with prescribing clinician: 9/13/2022      Next office visit with prescribing clinician: Visit date not found            Reena Martin LPN  12/08/22, 10:47 CST

## 2023-01-04 ENCOUNTER — PATIENT MESSAGE (OUTPATIENT)
Dept: FAMILY MEDICINE CLINIC | Facility: CLINIC | Age: 58
End: 2023-01-04
Payer: COMMERCIAL

## 2023-01-12 ENCOUNTER — OFFICE VISIT (OUTPATIENT)
Dept: OTOLARYNGOLOGY | Facility: CLINIC | Age: 58
End: 2023-01-12
Payer: COMMERCIAL

## 2023-01-12 ENCOUNTER — PROCEDURE VISIT (OUTPATIENT)
Dept: OTOLARYNGOLOGY | Facility: CLINIC | Age: 58
End: 2023-01-12
Payer: COMMERCIAL

## 2023-01-12 VITALS — BODY MASS INDEX: 29.86 KG/M2 | WEIGHT: 197 LBS | HEIGHT: 68 IN

## 2023-01-12 DIAGNOSIS — H90.3 SENSORINEURAL HEARING LOSS (SNHL) OF BOTH EARS: Primary | ICD-10-CM

## 2023-01-12 DIAGNOSIS — H93.13 TINNITUS OF BOTH EARS: ICD-10-CM

## 2023-01-12 DIAGNOSIS — H93.13 TINNITUS OF BOTH EARS: Primary | ICD-10-CM

## 2023-01-12 DIAGNOSIS — H90.3 SENSORINEURAL HEARING LOSS (SNHL) OF BOTH EARS: ICD-10-CM

## 2023-01-12 PROCEDURE — 92567 TYMPANOMETRY: CPT

## 2023-01-12 PROCEDURE — 92557 COMPREHENSIVE HEARING TEST: CPT

## 2023-01-12 PROCEDURE — 99213 OFFICE O/P EST LOW 20 MIN: CPT | Performed by: OTOLARYNGOLOGY
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
## 2023-01-13 NOTE — PROGRESS NOTES
Oscar Urrutia MD  [facility]  [address], [address]  [place] [address]  Fax [phone]  Phone [phone]      Visit Type: FOLLOW UP   Chief Complaint   Patient presents with   • Sinus Problem        HPI  Selvin Garcia is a  57 y.o. male who is here for follow up.  He has had a problem with bilateral tinnitus.  This is been more bothersome over the last several months.  It bothers him mostly in quiet rooms and at night.  He notices some hearing loss.  He is a lot of loud noise exposure working as a .  He has had a lot of stressors in his life recently which he feels is influencing his tenderness.  He has not reported sinonasal flareups recently.    Past Medical History:   Diagnosis Date   • Abnormal CT of paranasal sinuses 08/20/2015    (Note 1 of 3)  NASAL SEPTUM:  The nasal septum is relatively midline.  MAXILLARY SINUSES:  The left maxillary sinus showed >5 m mucosal thickening and 50% opacification and the right maxillary sinus showed >5 mm mucosal thickening. The osteomeatal complex is obstructed bilaterally.   • Abnormal CT of paranasal sinuses 08/20/2015    (Note 2 of 3)  NASAL TURBINATES: The inferior turbinates showed bilateral hypertrophy. The Cherie bullosa are not present.  ETHMOID SINUSES: The left ethmoid sinus showed >3 mm mucosal thickening and complete opacification. The lamina papyracea appears intact bilaterally.   • Abnormal CT of paranasal sinuses 08/20/2015    (Note 3 of 3)  FRONTAL SINUSES:  The left frontal sinus showed complete opacification. The right frontal sinus showed complete opacification. SPENOID SINUSES: The left sphenoid sinus showed moderate mucosal thickening. The right sphenoid sinus showed moderate mucosal thickening.   • Allergic rhinitis 12/01/2015   • Chronic rhinitis    • Chronic sinusitis    • Disturbances of sensation of smell and taste 12/01/2015   • Diverticulosis     • History of adenomatous polyp of colon    • History of colon polyps    • Sensorineural hearing loss, bilateral 12/01/2015   • Subjective tinnitus 12/01/2015       Past Surgical History:   Procedure Laterality Date   • COLONOSCOPY  12/09/2013    Dr. Chavez-Polyp; Repeat 5 years (Abstracted from PCP's note)   • COLONOSCOPY N/A 9/24/2021    One 6mm tubular adenomatous polyp in the ascending colon-Resected and retrieved-Clip (MR conditional) was placed; Diverticulosis in the left colon; Non-bleeding internal hemorrhoids; Repeat 5 years    • ENDOSCOPY  08/14/2015    Dr. Mullins-Gastric ulcer; Repeat 8 weeks (Abstracted from PCP's note)   • ENDOSCOPY  10/16/2015    Dr. Mullins-Dr. Mullins (Abstracted from PCP's note)   • ENDOSCOPY N/A 10/15/2021    Procedure: ESOPHAGOGASTRODUODENOSCOPY WITH ANESTHESIA;  Surgeon: Isaura Fragoso MD;  Location: [facility] ENDOSCOPY;  Service: Gastroenterology;  Laterality: N/A;  pre screen  post balloon  Dr. Pedersen   • FRACTURE SURGERY     • FUNCTIONAL ENDOSCOPIC SINUS SURGERY  07/26/2016   • HEMORRHOIDECTOMY     • KNEE ARTHROPLASTY, PARTIAL REPLACEMENT Left    • KNEE MENISCECTOMY Right     X 2   • OTHER SURGICAL HISTORY      Open Reduction-Internal Fixation   • ROTATOR CUFF REPAIR Right    • SEPTOPLASTY      Per Dr. Urrutia 7-8 years ago       Family History: His family history includes Diabetes in an other family member; No Known Problems in his father and mother.     Social History: He  reports that he has never smoked. He has never used smokeless tobacco. He reports current alcohol use. He reports that he does not use drugs.    Home Medications:  Beclomethasone Diprop HFA, Diclofenac Sodium, albuterol sulfate HFA, cetirizine, fluticasone, and montelukast    Allergies:  He has No Known Allergies.       Vital Signs:      ENT Physical Exam  Constitutional  Appearance: patient appears well-developed and well-nourished,  Communication/Voice: communication appropriate for developmental age;  vocal quality normal;  Head and Face  Appearance: head appears normal, face appears normal and face appears atraumatic;  Palpation: facial palpation normal;  Salivary: glands normal;  Ear  Hearing: intact;  Auricles: right auricle normal; left auricle normal;  External Mastoids: right external mastoid normal; left external mastoid normal;  Ear Canals: bilateral ear canals normal;  Tympanic Membranes: bilateral tympanic membranes normal;  Nose  External Nose: nares patent bilaterally; external nose normal;  Oral Cavity/Oropharynx  Lips: normal;  Neck  Neck: neck normal;  Respiratory  Inspection: breathing unlabored; normal breathing rate;  Cardiovascular  Inspection: extremities are warm and well perfused; no peripheral edema present;  Neurovestibular  Mental Status: alert and oriented;  Psychiatric: mood normal; affect is appropriate;         Result Review    RESULTS REVIEW    Progress Notes by Madelyn Mcdonald AUD (01/12/2023 15:00)  I have reviewed the patients old records in the chart.   Audiologic testing reviewed. There is bilateral high-frequency sensorineural hearing loss which is relatively symmetric.     Assessment & Plan    Diagnoses and all orders for this visit:    1. Tinnitus of both ears (Primary)    2. Sensorineural hearing loss (SNHL) of both ears       Use hearing protection in loud noise situations.  Consider hearing aid amplification.  Use home masking techniques- use low sound level of a pleasant sound like a fan or radio at night to drown out the tinnitus sound. If not working, can consider formal masking device through our hearing aid department.   Consider discussing stress and possible antianxiety/antidepressive medication with primary care.  Decrease caffeine intake and watch salt intake               Oscar Urrutia MD  01/13/23  11:26 CST

## 2023-01-23 ENCOUNTER — PATIENT MESSAGE (OUTPATIENT)
Dept: FAMILY MEDICINE CLINIC | Facility: CLINIC | Age: 58
End: 2023-01-23
Payer: COMMERCIAL

## 2023-01-23 DIAGNOSIS — R41.840 POOR CONCENTRATION: ICD-10-CM

## 2023-01-23 RX ORDER — METHYLPHENIDATE HYDROCHLORIDE 36 MG/1
36 TABLET ORAL EVERY MORNING
Qty: 30 TABLET | Refills: 0 | Status: SHIPPED | OUTPATIENT
Start: 2023-01-23 | End: 2023-01-25 | Stop reason: SDUPTHER

## 2023-01-23 NOTE — TELEPHONE ENCOUNTER
IL/RAMYA ledesma wnl  protocol     Rx Refill Note  Requested Prescriptions     Pending Prescriptions Disp Refills   • methylphenidate (Concerta) 36 MG CR tablet 30 tablet 0     Sig: Take 1 tablet by mouth Every Morning      Last office visit with prescribing clinician: 9/13/2022      Next office visit with prescribing clinician: Visit date not found            Reena Martin LPN  01/23/23, 16:34 CST

## 2023-01-25 DIAGNOSIS — R41.840 POOR CONCENTRATION: ICD-10-CM

## 2023-01-25 RX ORDER — METHYLPHENIDATE HYDROCHLORIDE 36 MG/1
36 TABLET ORAL EVERY MORNING
Qty: 30 TABLET | Refills: 0 | Status: SHIPPED | OUTPATIENT
Start: 2023-01-25 | End: 2023-03-02 | Stop reason: SDUPTHER

## 2023-01-25 NOTE — TELEPHONE ENCOUNTER
Pt unable to get concerta local [facility], found out [facility] in [place] also did not have, did call [facility] pharmacy and they have rx requested in stock, please resubmit    [place]/jennifer ledesma wnl, [facility] get [facility] or [facility]    CSA not on blue sticky for chart    Rx Refill Note  Requested Prescriptions     Pending Prescriptions Disp Refills   • methylphenidate (Concerta) 36 MG CR tablet 30 tablet 0     Sig: Take 1 tablet by mouth Every Morning      Last office visit with prescribing clinician: 9/13/2022      Next office visit with prescribing clinician: Visit date not found            Reena Martin, LPN  01/25/23, 10:12 CST

## 2023-01-25 NOTE — TELEPHONE ENCOUNTER
Caller: Betzy Garcia    Relationship: Emergency Contact    Best call back number: [phone]    What is the best time to reach you: ANYTIME    Who are you requesting to speak with (clinical staff, provider,  specific staff member): CLINICAL      What was the call regarding: STU MCKEON. BETZY HAS FOUND A PHARMACY THAT HAS IT. PLEASE SEND OVER TO    [facility]  [facility]  [address], [place] [address] · ~[address]  [phone]    Do you require a callback: YES ONCE SENT OVER

## 2023-03-01 ENCOUNTER — PATIENT MESSAGE (OUTPATIENT)
Dept: FAMILY MEDICINE CLINIC | Facility: CLINIC | Age: 58
End: 2023-03-01
Payer: COMMERCIAL

## 2023-03-01 DIAGNOSIS — R41.840 POOR CONCENTRATION: ICD-10-CM

## 2023-03-02 RX ORDER — METHYLPHENIDATE HYDROCHLORIDE 36 MG/1
36 TABLET ORAL EVERY MORNING
Qty: 30 TABLET | Refills: 0 | Status: SHIPPED | OUTPATIENT
Start: 2023-03-02 | End: 2023-04-03 | Stop reason: SDUPTHER

## 2023-03-02 NOTE — TELEPHONE ENCOUNTER
From: Selvin Garcia  To: Aquilino Pedersen  Sent: 3/1/2023 4:37 PM CST  Subject: Concerta refill    Aquilino I would like concerta refill. Can you send it to [facility] pharmacy in [place]. No one else had any last time I checked. Thank you

## 2023-04-03 DIAGNOSIS — R41.840 POOR CONCENTRATION: ICD-10-CM

## 2023-04-03 RX ORDER — METHYLPHENIDATE HYDROCHLORIDE 36 MG/1
36 TABLET ORAL EVERY MORNING
Qty: 30 TABLET | Refills: 0 | Status: SHIPPED | OUTPATIENT
Start: 2023-04-03

## 2023-04-03 NOTE — TELEPHONE ENCOUNTER
[place]  RAM[facility]STON WNL    Rx Refill Note  Requested Prescriptions     Pending Prescriptions Disp Refills   • methylphenidate (Concerta) 36 MG CR tablet 30 tablet 0     Sig: Take 1 tablet by mouth Every Morning      Last office visit with prescribing clinician: 9/13/2022   Last telemedicine visit with prescribing clinician: 6/13/2023   Next office visit with prescribing clinician: Visit date not found                         Would you like a call back once the refill request has been completed: [] Yes [] No    If the office needs to give you a call back, can they leave a voicemail: [] Yes [] No    Katy Garcia MA  04/03/23, 12:36 CDT

## 2023-04-10 DIAGNOSIS — J32.9 CHRONIC SINUSITIS, UNSPECIFIED LOCATION: ICD-10-CM

## 2023-04-10 RX ORDER — MONTELUKAST SODIUM 10 MG/1
TABLET ORAL
Qty: 90 TABLET | Refills: 3 | Status: SHIPPED | OUTPATIENT
Start: 2023-04-10

## 2023-04-10 RX ORDER — CETIRIZINE HYDROCHLORIDE 10 MG/1
TABLET ORAL
Qty: 90 TABLET | Refills: 3 | Status: SHIPPED | OUTPATIENT
Start: 2023-04-10

## 2023-05-03 DIAGNOSIS — R41.840 POOR CONCENTRATION: ICD-10-CM

## 2023-05-03 RX ORDER — METHYLPHENIDATE HYDROCHLORIDE 36 MG/1
36 TABLET ORAL EVERY MORNING
Qty: 30 TABLET | Refills: 0 | Status: SHIPPED | OUTPATIENT
Start: 2023-05-03 | End: 2023-05-04 | Stop reason: SDUPTHER

## 2023-05-03 NOTE — TELEPHONE ENCOUNTER
jennifer ledesma wnl    Rx Refill Note  Requested Prescriptions     Pending Prescriptions Disp Refills   • methylphenidate (Concerta) 36 MG CR tablet 30 tablet 0     Sig: Take 1 tablet by mouth Every Morning      Last office visit with prescribing clinician: 9/13/2022   Last telemedicine visit with prescribing clinician: 6/13/2023   Next office visit with prescribing clinician: Visit date not found                         Would you like a call back once the refill request has been completed: [] Yes [] No    If the office needs to give you a call back, can they leave a voicemail: [] Yes [] No    Katy Garcia MA  05/03/23, 16:17 CDT

## 2023-05-04 DIAGNOSIS — R41.840 POOR CONCENTRATION: ICD-10-CM

## 2023-05-04 RX ORDER — METHYLPHENIDATE HYDROCHLORIDE 36 MG/1
36 TABLET ORAL EVERY MORNING
Qty: 30 TABLET | Refills: 0 | Status: SHIPPED | OUTPATIENT
Start: 2023-05-04

## 2023-06-05 DIAGNOSIS — R41.840 POOR CONCENTRATION: ICD-10-CM

## 2023-06-05 RX ORDER — METHYLPHENIDATE HYDROCHLORIDE 36 MG/1
36 TABLET ORAL EVERY MORNING
Qty: 30 TABLET | Refills: 0 | Status: CANCELLED | OUTPATIENT
Start: 2023-06-05

## 2023-06-06 DIAGNOSIS — R41.840 POOR CONCENTRATION: ICD-10-CM

## 2023-06-06 RX ORDER — METHYLPHENIDATE HYDROCHLORIDE 36 MG/1
36 TABLET ORAL EVERY MORNING
Qty: 30 TABLET | Refills: 0 | Status: SHIPPED | OUTPATIENT
Start: 2023-06-06

## 2023-06-06 NOTE — TELEPHONE ENCOUNTER
Rx Refill Note  Requested Prescriptions      No prescriptions requested or ordered in this encounter      Last office visit with prescribing clinician: 9/13/2022   Last telemedicine visit with prescribing clinician: Visit date not found   Next office visit with prescribing clinician: Visit date not found                         Would you like a call back once the refill request has been completed: [] Yes [x] No    If the office needs to give you a call back, can they leave a voicemail: [] Yes [x] No    Alexa Jean MA  06/06/23, 06:48 CDT      [place]

## 2023-07-17 ENCOUNTER — TELEPHONE (OUTPATIENT)
Dept: FAMILY MEDICINE CLINIC | Facility: CLINIC | Age: 58
End: 2023-07-17

## 2023-07-17 NOTE — TELEPHONE ENCOUNTER
Caller: Betzy Garcia    Relationship: Emergency Contact    Best call back number: [phone]     What is the best time to reach you: ANYTIME    Who are you requesting to speak with (clinical staff, provider,  specific staff member): CLINICAL    Do you know the name of the person who called: TAL    What was the call regarding: TAL WANTED TO LET DR. CUTLER KNOW THAT THEY ARE CANCELLING THE APPOINTMENT FOR TODAY BECAUSE ALEXANDER IS HEADING TO THE EMERG ROOM BECAUSE HE WAS HAVING SHARP CHEST PAINS.    Is it okay if the provider responds through MyChart: NO

## 2023-08-09 DIAGNOSIS — R41.840 POOR CONCENTRATION: ICD-10-CM

## 2023-08-10 RX ORDER — METHYLPHENIDATE HYDROCHLORIDE 36 MG/1
36 TABLET ORAL EVERY MORNING
Qty: 30 TABLET | Refills: 0 | Status: SHIPPED | OUTPATIENT
Start: 2023-08-10

## 2023-08-10 NOTE — TELEPHONE ENCOUNTER
[place]  Per protocol last refilled 07/06/23   Doxycycline Counseling:  Patient counseled regarding possible photosensitivity and increased risk for sunburn.  Patient instructed to avoid sunlight, if possible.  When exposed to sunlight, patients should wear protective clothing, sunglasses, and sunscreen.  The patient was instructed to call the office immediately if the following severe adverse effects occur:  hearing changes, easy bruising/bleeding, severe headache, or vision changes.  The patient verbalized understanding of the proper use and possible adverse effects of doxycycline.  All of the patient's questions and concerns were addressed.

## 2023-08-14 DIAGNOSIS — R41.840 POOR CONCENTRATION: ICD-10-CM

## 2023-08-14 RX ORDER — METHYLPHENIDATE HYDROCHLORIDE 36 MG/1
36 TABLET ORAL EVERY MORNING
Qty: 30 TABLET | Refills: 0 | OUTPATIENT
Start: 2023-08-14

## 2023-09-06 DIAGNOSIS — R41.840 POOR CONCENTRATION: ICD-10-CM

## 2023-09-07 RX ORDER — METHYLPHENIDATE HYDROCHLORIDE 36 MG/1
36 TABLET ORAL EVERY MORNING
Qty: 30 TABLET | Refills: 0 | Status: SHIPPED | OUTPATIENT
Start: 2023-09-07

## 2023-09-22 DIAGNOSIS — J45.902 ASTHMA WITH STATUS ASTHMATICUS, UNSPECIFIED ASTHMA SEVERITY, UNSPECIFIED WHETHER PERSISTENT: ICD-10-CM

## 2023-09-22 RX ORDER — BECLOMETHASONE DIPROPIONATE HFA 80 UG/1
AEROSOL, METERED RESPIRATORY (INHALATION)
Qty: 31.8 G | Refills: 5 | Status: SHIPPED | OUTPATIENT
Start: 2023-09-22

## 2023-10-09 DIAGNOSIS — R41.840 POOR CONCENTRATION: ICD-10-CM

## 2023-10-09 RX ORDER — METHYLPHENIDATE HYDROCHLORIDE 36 MG/1
36 TABLET ORAL EVERY MORNING
Qty: 30 TABLET | Refills: 0 | Status: SHIPPED | OUTPATIENT
Start: 2023-10-09

## 2023-11-08 DIAGNOSIS — R41.840 POOR CONCENTRATION: ICD-10-CM

## 2023-11-09 RX ORDER — METHYLPHENIDATE HYDROCHLORIDE 36 MG/1
36 TABLET ORAL EVERY MORNING
Qty: 30 TABLET | Refills: 0 | Status: SHIPPED | OUTPATIENT
Start: 2023-11-09

## 2023-12-09 DIAGNOSIS — R41.840 POOR CONCENTRATION: ICD-10-CM

## 2023-12-11 RX ORDER — METHYLPHENIDATE HYDROCHLORIDE 36 MG/1
36 TABLET ORAL EVERY MORNING
Qty: 30 TABLET | Refills: 0 | Status: SHIPPED | OUTPATIENT
Start: 2023-12-11

## 2023-12-11 NOTE — TELEPHONE ENCOUNTER
[place]    Per protocol last refill: 11/09/2023    Rx Refill Note  Requested Prescriptions     Pending Prescriptions Disp Refills    methylphenidate (Concerta) 36 MG CR tablet 30 tablet 0     Sig: Take 1 tablet by mouth Every Morning      Last office visit with prescribing clinician: 6/23/2023   Last telemedicine visit with prescribing clinician: Visit date not found   Next office visit with prescribing clinician: Visit date not found                         Would you like a call back once the refill request has been completed: [] Yes [] No    If the office needs to give you a call back, can they leave a voicemail: [] Yes [] No    Woo Christianson Rep  12/11/23, 10:56 CST

## 2024-01-07 DIAGNOSIS — R41.840 POOR CONCENTRATION: ICD-10-CM

## 2024-01-08 RX ORDER — METHYLPHENIDATE HYDROCHLORIDE 36 MG/1
36 TABLET ORAL EVERY MORNING
Qty: 30 TABLET | Refills: 0 | Status: SHIPPED | OUTPATIENT
Start: 2024-01-08

## 2024-01-08 NOTE — TELEPHONE ENCOUNTER
[place]    Per protocol last refill: 12/11/2023    Rx Refill Note  Requested Prescriptions     Pending Prescriptions Disp Refills    methylphenidate (Concerta) 36 MG CR tablet 30 tablet 0     Sig: Take 1 tablet by mouth Every Morning      Last office visit with prescribing clinician: 6/23/2023   Last telemedicine visit with prescribing clinician: Visit date not found   Next office visit with prescribing clinician: Visit date not found                         Would you like a call back once the refill request has been completed: [] Yes [] No    If the office needs to give you a call back, can they leave a voicemail: [] Yes [] No    Woo Christianson Rep  01/08/24, 08:24 CST

## 2024-02-04 DIAGNOSIS — R41.840 POOR CONCENTRATION: ICD-10-CM

## 2024-02-05 RX ORDER — METHYLPHENIDATE HYDROCHLORIDE 36 MG/1
36 TABLET ORAL EVERY MORNING
Qty: 30 TABLET | Refills: 0 | Status: SHIPPED | OUTPATIENT
Start: 2024-02-05

## 2024-02-05 NOTE — TELEPHONE ENCOUNTER
[place]    Per protocol last refill: 01/08/2024    Rx Refill Note  Requested Prescriptions     Pending Prescriptions Disp Refills    methylphenidate (Concerta) 36 MG CR tablet 30 tablet 0     Sig: Take 1 tablet by mouth Every Morning      Last office visit with prescribing clinician: 6/23/2023   Last telemedicine visit with prescribing clinician: Visit date not found   Next office visit with prescribing clinician: Visit date not found                         Would you like a call back once the refill request has been completed: [] Yes [] No    If the office needs to give you a call back, can they leave a voicemail: [] Yes [] No    Chioma Haji MA  02/05/24, 08:52 CST

## 2024-03-05 DIAGNOSIS — R41.840 POOR CONCENTRATION: ICD-10-CM

## 2024-03-05 RX ORDER — METHYLPHENIDATE HYDROCHLORIDE 36 MG/1
36 TABLET ORAL EVERY MORNING
Qty: 30 TABLET | Refills: 0 | Status: SHIPPED | OUTPATIENT
Start: 2024-03-05

## 2024-03-05 NOTE — TELEPHONE ENCOUNTER
[place]    Per protocol last refill: 02/05/2024    Rx Refill Note  Requested Prescriptions     Pending Prescriptions Disp Refills    methylphenidate (Concerta) 36 MG CR tablet 30 tablet 0     Sig: Take 1 tablet by mouth Every Morning      Last office visit with prescribing clinician: 6/23/2023   Last telemedicine visit with prescribing clinician: Visit date not found   Next office visit with prescribing clinician: Visit date not found                         Would you like a call back once the refill request has been completed: [] Yes [] No    If the office needs to give you a call back, can they leave a voicemail: [] Yes [] No    Chioma Haji MA  03/05/24, 08:11 CST

## 2024-04-03 DIAGNOSIS — R41.840 POOR CONCENTRATION: ICD-10-CM

## 2024-04-03 DIAGNOSIS — J32.9 CHRONIC SINUSITIS, UNSPECIFIED LOCATION: ICD-10-CM

## 2024-04-03 RX ORDER — MONTELUKAST SODIUM 10 MG/1
TABLET ORAL
Qty: 90 TABLET | Refills: 3 | Status: SHIPPED | OUTPATIENT
Start: 2024-04-03

## 2024-04-03 RX ORDER — METHYLPHENIDATE HYDROCHLORIDE 36 MG/1
36 TABLET ORAL EVERY MORNING
Qty: 30 TABLET | Refills: 0 | Status: SHIPPED | OUTPATIENT
Start: 2024-04-03

## 2024-04-03 RX ORDER — CETIRIZINE HYDROCHLORIDE 10 MG/1
TABLET ORAL
Qty: 90 TABLET | Refills: 3 | Status: SHIPPED | OUTPATIENT
Start: 2024-04-03

## 2024-04-03 NOTE — TELEPHONE ENCOUNTER
Rx Refill Note  Requested Prescriptions     Pending Prescriptions Disp Refills    methylphenidate (Concerta) 36 MG CR tablet 30 tablet 0     Sig: Take 1 tablet by mouth Every Morning      Last office visit with office: 06/23/2023  Next office visit with office: N/A    UDS: N/A    DATE OF LAST REFILL: 03/05/2024    Controlled Substance Agreement:  No CSA on file for Concerta    [place]NL         {TIP  Is Refill Pharmacy correct?:  Chioma Haji MA  04/03/24, 08:06 CDT

## 2024-04-10 ENCOUNTER — OFFICE VISIT (OUTPATIENT)
Dept: FAMILY MEDICINE CLINIC | Facility: CLINIC | Age: 59
End: 2024-04-10
Payer: COMMERCIAL

## 2024-04-10 ENCOUNTER — PATIENT MESSAGE (OUTPATIENT)
Dept: FAMILY MEDICINE CLINIC | Facility: CLINIC | Age: 59
End: 2024-04-10

## 2024-04-10 VITALS
TEMPERATURE: 97.1 F | SYSTOLIC BLOOD PRESSURE: 120 MMHG | DIASTOLIC BLOOD PRESSURE: 70 MMHG | RESPIRATION RATE: 18 BRPM | WEIGHT: 189.2 LBS | OXYGEN SATURATION: 97 % | BODY MASS INDEX: 28.67 KG/M2 | HEIGHT: 68 IN | HEART RATE: 76 BPM

## 2024-04-10 DIAGNOSIS — F41.8 DEPRESSION WITH ANXIETY: ICD-10-CM

## 2024-04-10 DIAGNOSIS — R05.9 COUGH: ICD-10-CM

## 2024-04-10 DIAGNOSIS — J18.9 PNEUMONIA OF LEFT LOWER LOBE DUE TO INFECTIOUS ORGANISM: Primary | ICD-10-CM

## 2024-04-10 DIAGNOSIS — R53.83 CAREGIVER WITH FATIGUE: ICD-10-CM

## 2024-04-10 DIAGNOSIS — J40 BRONCHITIS: ICD-10-CM

## 2024-04-10 DIAGNOSIS — J40 BRONCHITIS: Primary | ICD-10-CM

## 2024-04-10 RX ORDER — MELOXICAM 15 MG/1
15 TABLET ORAL DAILY
Qty: 30 TABLET | Refills: 0 | Status: SHIPPED | OUTPATIENT
Start: 2024-04-10

## 2024-04-10 RX ORDER — ALBUTEROL SULFATE 90 UG/1
2 AEROSOL, METERED RESPIRATORY (INHALATION) EVERY 4 HOURS PRN
Qty: 8 G | Refills: 0 | Status: SHIPPED | OUTPATIENT
Start: 2024-04-10 | End: 2024-04-10

## 2024-04-10 RX ORDER — CODEINE PHOSPHATE/GUAIFENESIN 10-100MG/5
5 LIQUID (ML) ORAL 3 TIMES DAILY PRN
Qty: 118 ML | Refills: 0 | Status: SHIPPED | OUTPATIENT
Start: 2024-04-10

## 2024-04-10 RX ORDER — DESVENLAFAXINE 25 MG/1
25 TABLET, EXTENDED RELEASE ORAL DAILY
Qty: 30 TABLET | Refills: 0 | Status: SHIPPED | OUTPATIENT
Start: 2024-04-10

## 2024-04-10 RX ORDER — LEVOFLOXACIN 500 MG/1
500 TABLET, FILM COATED ORAL DAILY
Qty: 10 TABLET | Refills: 0 | Status: SHIPPED | OUTPATIENT
Start: 2024-04-10 | End: 2024-04-20

## 2024-04-10 RX ORDER — ALBUTEROL SULFATE 90 UG/1
2 AEROSOL, METERED RESPIRATORY (INHALATION) EVERY 4 HOURS PRN
Qty: 25.5 G | Refills: 3 | Status: SHIPPED | OUTPATIENT
Start: 2024-04-10 | End: 2024-04-10

## 2024-04-10 RX ORDER — CEFTRIAXONE 1 G/1
1 INJECTION, POWDER, FOR SOLUTION INTRAMUSCULAR; INTRAVENOUS ONCE
Status: COMPLETED | OUTPATIENT
Start: 2024-04-10 | End: 2024-04-10

## 2024-04-10 RX ORDER — LEVOFLOXACIN 500 MG/1
500 TABLET, FILM COATED ORAL DAILY
Qty: 10 TABLET | Refills: 0 | Status: SHIPPED | OUTPATIENT
Start: 2024-04-10 | End: 2024-04-10

## 2024-04-10 RX ORDER — DEXAMETHASONE SODIUM PHOSPHATE 4 MG/ML
4 INJECTION, SOLUTION INTRA-ARTICULAR; INTRALESIONAL; INTRAMUSCULAR; INTRAVENOUS; SOFT TISSUE ONCE
Status: COMPLETED | OUTPATIENT
Start: 2024-04-10 | End: 2024-04-10

## 2024-04-10 RX ORDER — MELOXICAM 15 MG/1
15 TABLET ORAL DAILY
Qty: 30 TABLET | Refills: 0 | Status: SHIPPED | OUTPATIENT
Start: 2024-04-10 | End: 2024-04-10

## 2024-04-10 RX ORDER — DESVENLAFAXINE SUCCINATE 50 MG/1
50 TABLET, EXTENDED RELEASE ORAL DAILY
Qty: 30 TABLET | Refills: 5 | Status: SHIPPED | OUTPATIENT
Start: 2024-04-10

## 2024-04-10 RX ORDER — ALBUTEROL SULFATE 90 UG/1
2 AEROSOL, METERED RESPIRATORY (INHALATION) EVERY 4 HOURS PRN
Qty: 8 G | Refills: 0 | Status: SHIPPED | OUTPATIENT
Start: 2024-04-10

## 2024-04-10 RX ADMIN — DEXAMETHASONE SODIUM PHOSPHATE 4 MG: 4 INJECTION, SOLUTION INTRA-ARTICULAR; INTRALESIONAL; INTRAMUSCULAR; INTRAVENOUS; SOFT TISSUE at 11:47

## 2024-04-10 RX ADMIN — CEFTRIAXONE 1 G: 1 INJECTION, POWDER, FOR SOLUTION INTRAMUSCULAR; INTRAVENOUS at 11:47

## 2024-04-10 NOTE — TELEPHONE ENCOUNTER
From: Selvin Garcia  To: Aldo Wood  Sent: 4/10/2024 1:16 PM CDT  Subject: Coughing     Hey Aldo I forgot to mention while I was there but could I get a prescription of my own to some liquid cough medicine? Shahram had some left over from him last year but it’s gone now and it works really good.

## 2024-04-11 ENCOUNTER — PATIENT MESSAGE (OUTPATIENT)
Dept: FAMILY MEDICINE CLINIC | Facility: CLINIC | Age: 59
End: 2024-04-11
Payer: COMMERCIAL

## 2024-04-11 NOTE — PROGRESS NOTES
Subjective   Chief Complaint:  Cough and congestion-discussion about medication  History of Present Illness  The patient presents today with cough and respiratory congestion.    The patient reports experiencing fatigue, however, he denies experiencing any fevers. His household is suffering from a similar illness. Additionally, he expresses interest in pharmacological intervention to manage stress, anxiety, and potentially a manifestation of depressive symptoms, potentially linked to his home environment.    Past Medical, Surgical, Social, and Family History:  No Known Allergies   Current Outpatient Medications on File Prior to Visit   Medication Sig    cetirizine (zyrTEC) 10 MG tablet TAKE 1 TABLET DAILY    Diclofenac Sodium (VOLTAREN) 1 % gel gel Apply 4 g topically to the appropriate area as directed 4 (Four) Times a Day As Needed (sore joint).    methylphenidate (Concerta) 36 MG CR tablet Take 1 tablet by mouth Every Morning    montelukast (SINGULAIR) 10 MG tablet TAKE 1 TABLET EVERY NIGHT    fluticasone (FLONASE) 50 MCG/ACT nasal spray 2 sprays into the nostril(s) as directed by provider Daily for 90 days.     No current facility-administered medications on file prior to visit.      Past Medical History:   Diagnosis Date    Abnormal CT of paranasal sinuses 08/20/2015    (Note 1 of 3)  NASAL SEPTUM:  The nasal septum is relatively midline.  MAXILLARY SINUSES:  The left maxillary sinus showed >5 m mucosal thickening and 50% opacification and the right maxillary sinus showed >5 mm mucosal thickening. The osteomeatal complex is obstructed bilaterally.    Abnormal CT of paranasal sinuses 08/20/2015    (Note 2 of 3)  NASAL TURBINATES: The inferior turbinates showed bilateral hypertrophy. The Cherie bullosa are not present.  ETHMOID SINUSES: The left ethmoid sinus showed >3 mm mucosal thickening and complete opacification. The lamina papyracea appears intact bilaterally.    Abnormal CT of paranasal sinuses 08/20/2015     (Note 3 of 3)  FRONTAL SINUSES:  The left frontal sinus showed complete opacification. The right frontal sinus showed complete opacification. SPENOID SINUSES: The left sphenoid sinus showed moderate mucosal thickening. The right sphenoid sinus showed moderate mucosal thickening.    Allergic rhinitis 12/01/2015    Chronic rhinitis     Chronic sinusitis     Disturbances of sensation of smell and taste 12/01/2015    Diverticulosis     History of adenomatous polyp of colon     History of colon polyps     Sensorineural hearing loss, bilateral 12/01/2015    Subjective tinnitus 12/01/2015     Past Surgical History:   Procedure Laterality Date    COLONOSCOPY  12/09/2013    Dr. Chavez-Polyp; Repeat 5 years (Abstracted from PCP's note)    COLONOSCOPY N/A 9/24/2021    One 6mm tubular adenomatous polyp in the ascending colon-Resected and retrieved-Clip (MR conditional) was placed; Diverticulosis in the left colon; Non-bleeding internal hemorrhoids; Repeat 5 years     ENDOSCOPY  08/14/2015    Dr. Mullins-Gastric ulcer; Repeat 8 weeks (Abstracted from PCP's note)    ENDOSCOPY  10/16/2015    Dr. Mullins-Dr. Mullins (Abstracted from PCP's note)    ENDOSCOPY N/A 10/15/2021    Procedure: ESOPHAGOGASTRODUODENOSCOPY WITH ANESTHESIA;  Surgeon: Isaura Fragoso MD;  Location: [facility] ENDOSCOPY;  Service: Gastroenterology;  Laterality: N/A;  pre screen  post balloon  Dr. Pedersen    FRACTURE SURGERY      FUNCTIONAL ENDOSCOPIC SINUS SURGERY  07/26/2016    HEMORRHOIDECTOMY      KNEE ARTHROPLASTY, PARTIAL REPLACEMENT Left     KNEE MENISCECTOMY Right     X 2    OTHER SURGICAL HISTORY      Open Reduction-Internal Fixation    ROTATOR CUFF REPAIR Right     SEPTOPLASTY      Per Dr. Urrutia 7-8 years ago     Social History     Socioeconomic History    Marital status:      Spouse name: Kelsy ()    Number of children: 2   Tobacco Use    Smoking status: Never     Passive exposure: Never    Smokeless tobacco: Never   Vaping Use     "Vaping status: Never Used   Substance and Sexual Activity    Alcohol use: Yes     Comment: Occasionally     Drug use: Never    Sexual activity: Defer     Family History   Problem Relation Age of Onset    Diabetes Other     No Known Problems Mother     No Known Problems Father     Colon polyps Neg Hx     Colon cancer Neg Hx     Esophageal cancer Neg Hx     Liver cancer Neg Hx     Liver disease Neg Hx     Rectal cancer Neg Hx     Stomach cancer Neg Hx        Prior Visit Notes/Records, Lab, Imaging, and Diagnostic Results Reviewed:  A1C:No results for input(s): \"HGBA1C\" in the last 59675 hours.  GLUCOSE:  Lab Results - Last 18 Months   Lab Units 07/17/23  1411   GLUCOSE mg/dL 114*     LIPID:No results for input(s): \"CHLPL\", \"LDL\", \"HDL\", \"TRIG\" in the last 21403 hours.  PSA:No results for input(s): \"PSA\" in the last 72018 hours.  CBC:  Lab Results - Last 18 Months   Lab Units 07/17/23  1411   WBC 10*3/mm3 6.97   HEMOGLOBIN g/dL 15.7   HEMATOCRIT % 45.8   PLATELETS 10*3/mm3 311      BMP/CMP:  Lab Results - Last 18 Months   Lab Units 07/17/23  1411   SODIUM mmol/L 141   POTASSIUM mmol/L 4.5   CHLORIDE mmol/L 105   CO2 mmol/L 23.0   GLUCOSE mg/dL 114*   BUN mg/dL 21*   CREATININE mg/dL 0.92   CALCIUM mg/dL 9.3     HEPATIC:  Lab Results - Last 18 Months   Lab Units 07/17/23  1411   ALT (SGPT) U/L 15   AST (SGOT) U/L 22   ALK PHOS U/L 80     Vit D:No results for input(s): \"VUQB42II\" in the last 75727 hours.  THYROID:No results for input(s): \"TSH\", \"FREET4\", \"FTI\" in the last 41713 hours.    Invalid input(s): \"FREET3\", \"T3\", \"T4\", \"TEUP\", \"TOTALT4\"  BMI Trend:  BMI Readings from Last 10 Encounters:   04/10/24 28.77 kg/m²   07/17/23 27.98 kg/m²   06/23/23 28.07 kg/m²   01/12/23 29.95 kg/m²   09/13/22 27.40 kg/m²   08/18/22 28.13 kg/m²   07/14/22 28.77 kg/m²   07/13/22 28.80 kg/m²   04/14/22 28.59 kg/m²   03/10/22 27.92 kg/m²     Objective   Vital Signs  /70 (BP Location: Left arm, Patient Position: Sitting, Cuff Size: " "Large Adult)   Pulse 76   Temp 97.1 °F (36.2 °C) (Infrared)   Resp 18   Ht 172.7 cm (68\")   Wt 85.8 kg (189 lb 3.2 oz)   SpO2 97%   BMI 28.77 kg/m²   Physical Exam  Pulmonary:      Effort: Pulmonary effort is normal.      Breath sounds: Normal breath sounds.      Comments: Left lower lobe with wheezes and crackles  Psychiatric:      Comments: Openly voices feelings         Assessment & Plan   Diagnoses and all orders for this visit:    1. Pneumonia of left lower lobe due to infectious organism (Primary)  -     dexAMETHasone (DECADRON) injection 4 mg  -     cefTRIAXone (ROCEPHIN) injection 1 g    2. Bronchitis  -     Discontinue: albuterol sulfate HFA (ProAir HFA) 108 (90 Base) MCG/ACT inhaler; Inhale 2 puffs Every 4 (Four) Hours As Needed for Wheezing.  Dispense: 25.5 g; Refill: 3    3. Cough  -     Discontinue: albuterol sulfate HFA (ProAir HFA) 108 (90 Base) MCG/ACT inhaler; Inhale 2 puffs Every 4 (Four) Hours As Needed for Wheezing.  Dispense: 25.5 g; Refill: 3    4. Depression with anxiety    5. Caregiver with fatigue    Other orders  -     Discontinue: levoFLOXacin (Levaquin) 500 MG tablet; Take 1 tablet by mouth Daily for 10 days.  Dispense: 10 tablet; Refill: 0  -     Discontinue: albuterol sulfate  (90 Base) MCG/ACT inhaler; Inhale 2 puffs Every 4 (Four) Hours As Needed for Wheezing or Shortness of Air.  Dispense: 8 g; Refill: 0  -     Discontinue: albuterol sulfate  (90 Base) MCG/ACT inhaler; Inhale 2 puffs Every 4 (Four) Hours As Needed for Wheezing or Shortness of Air.  Dispense: 8 g; Refill: 0  -     Discontinue: levoFLOXacin (Levaquin) 500 MG tablet; Take 1 tablet by mouth Daily for 10 days.  Dispense: 10 tablet; Refill: 0  -     Discontinue: meloxicam (Mobic) 15 MG tablet; Take 1 tablet by mouth Daily.  Dispense: 30 tablet; Refill: 0  -     levoFLOXacin (Levaquin) 500 MG tablet; Take 1 tablet by mouth Daily for 10 days.  Dispense: 10 tablet; Refill: 0  -     albuterol sulfate HFA " 108 (90 Base) MCG/ACT inhaler; Inhale 2 puffs Every 4 (Four) Hours As Needed for Wheezing or Shortness of Air.  Dispense: 8 g; Refill: 0  -     Desvenlafaxine Succinate ER (Pristiq) 25 MG tablet sustained-release 24 hour; Take 1 tablet by mouth Daily.  Dispense: 30 tablet; Refill: 0  -     desvenlafaxine (Pristiq) 50 MG 24 hr tablet; Take 1 tablet by mouth Daily.  Dispense: 30 tablet; Refill: 5  -     meloxicam (Mobic) 15 MG tablet; Take 1 tablet by mouth Daily.  Dispense: 30 tablet; Refill: 0    Discussions & Anticipatory Guidance:  Advised and educated plan of care.    The patient will Return in about 6 weeks (around 5/22/2024) for Follow-Up.      Electronically signed by RIRI Hall, 04/11/24, 7:50 AM CDT.

## 2024-04-12 ENCOUNTER — PATIENT MESSAGE (OUTPATIENT)
Dept: FAMILY MEDICINE CLINIC | Facility: CLINIC | Age: 59
End: 2024-04-12
Payer: COMMERCIAL

## 2024-04-12 RX ORDER — ALBUTEROL SULFATE 1.25 MG/3ML
1 SOLUTION RESPIRATORY (INHALATION) EVERY 6 HOURS PRN
Qty: 120 ML | Refills: 0 | Status: SHIPPED | OUTPATIENT
Start: 2024-04-12

## 2024-04-12 RX ORDER — METHYLPREDNISOLONE 4 MG/1
TABLET ORAL
Qty: 1 EACH | Refills: 0 | Status: SHIPPED | OUTPATIENT
Start: 2024-04-12

## 2024-04-12 NOTE — TELEPHONE ENCOUNTER
From: Selvin Garcia  To: Aldo Wood  Sent: 4/11/2024 3:19 PM CDT  Subject: Pneumonia    Aldo do you recommend a pneumonia vaccine? If so how long after I am over this episode could it be taken? I am def not over this yet and if the vac is recommended then I am interested.

## 2024-04-12 NOTE — TELEPHONE ENCOUNTER
From: Selvin Garcia  To: Aldo Wood  Sent: 4/12/2024 10:55 AM CDT  Subject: Sick    Aldo I am not sure if I am expecting too much too soon but can honestly say I feel worse now than I did when I was up to see you. Would I be seeing some improvement yet?

## 2024-05-03 ENCOUNTER — PATIENT MESSAGE (OUTPATIENT)
Dept: FAMILY MEDICINE CLINIC | Facility: CLINIC | Age: 59
End: 2024-05-03
Payer: COMMERCIAL

## 2024-05-03 DIAGNOSIS — R41.840 POOR CONCENTRATION: ICD-10-CM

## 2024-05-03 RX ORDER — METHYLPHENIDATE HYDROCHLORIDE 36 MG/1
36 TABLET ORAL EVERY MORNING
Qty: 30 TABLET | Refills: 0 | Status: SHIPPED | OUTPATIENT
Start: 2024-05-03

## 2024-05-03 NOTE — TELEPHONE ENCOUNTER
Rx Refill Note  Requested Prescriptions     Pending Prescriptions Disp Refills    methylphenidate (Concerta) 36 MG CR tablet 30 tablet 0     Sig: Take 1 tablet by mouth Every Morning      Last office visit with office: 04/10/24  Next office visit with office: 05/29/24    UDS: na    DATE OF LAST REFILL: 04/03/24    Controlled Substance Agreement: not up to date    [place]: 04/03/24         {TIP  Is Refill Pharmacy correct?:  Melodie Hahn MA  05/03/24, 08:08 CDT

## 2024-05-03 NOTE — TELEPHONE ENCOUNTER
From: Selvin Garcia  To: Aldo Wood  Sent: 5/3/2024 3:57 PM CDT  Subject: Hilary Carlson, [facility] only have 20 concerta for my refill. They are not confident that there will be any more coming. Is there something else to use or should I call around to some other pharmacy?

## 2024-05-13 DIAGNOSIS — R41.840 POOR CONCENTRATION: ICD-10-CM

## 2024-05-13 RX ORDER — METHYLPHENIDATE HYDROCHLORIDE 36 MG/1
36 TABLET ORAL EVERY MORNING
Qty: 30 TABLET | Refills: 0 | Status: SHIPPED | OUTPATIENT
Start: 2024-05-13

## 2024-05-13 RX ORDER — METHYLPHENIDATE HYDROCHLORIDE 36 MG/1
36 TABLET ORAL EVERY MORNING
Refills: 0 | OUTPATIENT
Start: 2024-05-13

## 2024-05-13 NOTE — TELEPHONE ENCOUNTER
Rx Refill Note  Requested Prescriptions     Pending Prescriptions Disp Refills    methylphenidate (Concerta) 36 MG CR tablet 30 tablet 0     Sig: Take 1 tablet by mouth Every Morning      Last office visit with office: 04/10/2024  Next office visit with office: 05/29/2024    UDS:     DATE OF LAST REFILL: 05/03/2024    Controlled Substance Agreement:     [place] supply that was filled on 05/03         {TIP  Is Refill Pharmacy correct?:  Chioma Haji MA  05/13/24, 16:52 CDT

## 2024-05-31 ENCOUNTER — OFFICE VISIT (OUTPATIENT)
Dept: FAMILY MEDICINE CLINIC | Facility: CLINIC | Age: 59
End: 2024-05-31
Payer: COMMERCIAL

## 2024-05-31 VITALS
TEMPERATURE: 97.1 F | OXYGEN SATURATION: 97 % | BODY MASS INDEX: 29.25 KG/M2 | WEIGHT: 193 LBS | HEIGHT: 68 IN | SYSTOLIC BLOOD PRESSURE: 130 MMHG | DIASTOLIC BLOOD PRESSURE: 72 MMHG | HEART RATE: 75 BPM

## 2024-05-31 DIAGNOSIS — Z12.5 SCREENING FOR PROSTATE CANCER: ICD-10-CM

## 2024-05-31 DIAGNOSIS — R53.83 CAREGIVER WITH FATIGUE: ICD-10-CM

## 2024-05-31 DIAGNOSIS — Z00.00 WELLNESS EXAMINATION: ICD-10-CM

## 2024-05-31 DIAGNOSIS — F41.8 DEPRESSION WITH ANXIETY: Primary | ICD-10-CM

## 2024-05-31 DIAGNOSIS — Z23 NEED FOR STREPTOCOCCUS PNEUMONIAE VACCINATION: ICD-10-CM

## 2024-05-31 PROCEDURE — 99214 OFFICE O/P EST MOD 30 MIN: CPT | Performed by: NURSE PRACTITIONER

## 2024-05-31 PROCEDURE — 90677 PCV20 VACCINE IM: CPT | Performed by: NURSE PRACTITIONER

## 2024-05-31 PROCEDURE — 90471 IMMUNIZATION ADMIN: CPT | Performed by: NURSE PRACTITIONER

## 2024-05-31 NOTE — PROGRESS NOTES
Subjective   Chief Complaint:  Re-eval mood    History of Present Illness:  This 58 y.o. male was seen in the office today.  Doing well on pristiq, up to 50 mg after this bottle - no ill effects.        Past Medical, Surgical, Social, and Family History:  No Known Allergies   Current Outpatient Medications on File Prior to Visit   Medication Sig    albuterol sulfate  (90 Base) MCG/ACT inhaler Inhale 2 puffs Every 4 (Four) Hours As Needed for Wheezing or Shortness of Air.    cetirizine (zyrTEC) 10 MG tablet TAKE 1 TABLET DAILY    Desvenlafaxine Succinate ER (Pristiq) 25 MG tablet sustained-release 24 hour Take 1 tablet by mouth Daily.    Diclofenac Sodium (VOLTAREN) 1 % gel gel Apply 4 g topically to the appropriate area as directed 4 (Four) Times a Day As Needed (sore joint).    meloxicam (Mobic) 15 MG tablet Take 1 tablet by mouth Daily.    methylphenidate (Concerta) 36 MG CR tablet Take 1 tablet by mouth Every Morning    montelukast (SINGULAIR) 10 MG tablet TAKE 1 TABLET EVERY NIGHT    albuterol (ACCUNEB) 1.25 MG/3ML nebulizer solution Take 3 mL by nebulization Every 6 (Six) Hours As Needed for Shortness of Air.    desvenlafaxine (Pristiq) 50 MG 24 hr tablet Take 1 tablet by mouth Daily.    fluticasone (FLONASE) 50 MCG/ACT nasal spray 2 sprays into the nostril(s) as directed by provider Daily for 90 days.    [DISCONTINUED] guaiFENesin-codeine (GUAIFENESIN AC) 100-10 MG/5ML liquid Take 5 mL by mouth 3 (Three) Times a Day As Needed for Cough.    [DISCONTINUED] methylPREDNISolone (MEDROL) 4 MG dose pack Take as directed on package instructions.     No current facility-administered medications on file prior to visit.      Past Medical History:   Diagnosis Date    Abnormal CT of paranasal sinuses 08/20/2015    (Note 1 of 3)  NASAL SEPTUM:  The nasal septum is relatively midline.  MAXILLARY SINUSES:  The left maxillary sinus showed >5 m mucosal thickening and 50% opacification and the right maxillary sinus showed  >5 mm mucosal thickening. The osteomeatal complex is obstructed bilaterally.    Abnormal CT of paranasal sinuses 08/20/2015    (Note 2 of 3)  NASAL TURBINATES: The inferior turbinates showed bilateral hypertrophy. The Cherie bullosa are not present.  ETHMOID SINUSES: The left ethmoid sinus showed >3 mm mucosal thickening and complete opacification. The lamina papyracea appears intact bilaterally.    Abnormal CT of paranasal sinuses 08/20/2015    (Note 3 of 3)  FRONTAL SINUSES:  The left frontal sinus showed complete opacification. The right frontal sinus showed complete opacification. SPENOID SINUSES: The left sphenoid sinus showed moderate mucosal thickening. The right sphenoid sinus showed moderate mucosal thickening.    Allergic rhinitis 12/01/2015    Chronic rhinitis     Chronic sinusitis     Disturbances of sensation of smell and taste 12/01/2015    Diverticulosis     History of adenomatous polyp of colon     History of colon polyps     Sensorineural hearing loss, bilateral 12/01/2015    Subjective tinnitus 12/01/2015     Past Surgical History:   Procedure Laterality Date    COLONOSCOPY  12/09/2013    Dr. Chavez-Polyp; Repeat 5 years (Abstracted from PCP's note)    COLONOSCOPY N/A 9/24/2021    One 6mm tubular adenomatous polyp in the ascending colon-Resected and retrieved-Clip (MR conditional) was placed; Diverticulosis in the left colon; Non-bleeding internal hemorrhoids; Repeat 5 years     ENDOSCOPY  08/14/2015    Dr. Mullins-Gastric ulcer; Repeat 8 weeks (Abstracted from PCP's note)    ENDOSCOPY  10/16/2015    Dr. Mullins-Dr. Mullins (Abstracted from PCP's note)    ENDOSCOPY N/A 10/15/2021    Procedure: ESOPHAGOGASTRODUODENOSCOPY WITH ANESTHESIA;  Surgeon: Isaura Fragoso MD;  [facility];  Service: Gastroenterology;  Laterality: N/A;  pre screen  post balloon  Dr. Pedersen    FRACTURE SURGERY      FUNCTIONAL ENDOSCOPIC SINUS SURGERY  07/26/2016    HEMORRHOIDECTOMY      KNEE ARTHROPLASTY,  "PARTIAL REPLACEMENT Left     KNEE MENISCECTOMY Right     X 2    OTHER SURGICAL HISTORY      Open Reduction-Internal Fixation    ROTATOR CUFF REPAIR Right     SEPTOPLASTY      Per Dr. Urrutia 7-8 years ago     Social History     Socioeconomic History    Marital status:      Spouse name: Kelsy ()    Number of children: 2   Tobacco Use    Smoking status: Never     Passive exposure: Never    Smokeless tobacco: Never   Vaping Use    Vaping status: Never Used   Substance and Sexual Activity    Alcohol use: Yes     Comment: Occasionally     Drug use: Never    Sexual activity: Defer     Family History   Problem Relation Age of Onset    Diabetes Other     No Known Problems Mother     No Known Problems Father     Colon polyps Neg Hx     Colon cancer Neg Hx     Esophageal cancer Neg Hx     Liver cancer Neg Hx     Liver disease Neg Hx     Rectal cancer Neg Hx     Stomach cancer Neg Hx        Prior Visit Notes/Records, Lab, Imaging, and Diagnostic Results Reviewed:  A1C:No results for input(s): \"HGBA1C\" in the last 70710 hours.  GLUCOSE:  Lab Results - Last 18 Months   Lab Units 07/17/23  1411   GLUCOSE mg/dL 114*     LIPID:No results for input(s): \"CHLPL\", \"LDL\", \"HDL\", \"TRIG\" in the last 78106 hours.  PSA:No results for input(s): \"PSA\" in the last 73873 hours.  CBC:  Lab Results - Last 18 Months   Lab Units 07/17/23  1411   WBC 10*3/mm3 6.97   HEMOGLOBIN g/dL 15.7   HEMATOCRIT % 45.8   PLATELETS 10*3/mm3 311      BMP/CMP:  Lab Results - Last 18 Months   Lab Units 07/17/23  1411   SODIUM mmol/L 141   POTASSIUM mmol/L 4.5   CHLORIDE mmol/L 105   CO2 mmol/L 23.0   GLUCOSE mg/dL 114*   BUN mg/dL 21*   CREATININE mg/dL 0.92   CALCIUM mg/dL 9.3     HEPATIC:  Lab Results - Last 18 Months   Lab Units 07/17/23  1411   ALT (SGPT) U/L 15   AST (SGOT) U/L 22   ALK PHOS U/L 80     Vit D:No results for input(s): \"WDPA81KR\" in the last 13960 hours.  THYROID:No results for input(s): \"TSH\", \"FREET4\", \"FTI\" in the last 25206 " "hours.    Invalid input(s): \"FREET3\", \"T3\", \"T4\", \"TEUP\", \"TOTALT4\"  BMI Trend:  BMI Readings from Last 10 Encounters:   05/31/24 29.35 kg/m²   04/10/24 28.77 kg/m²   07/17/23 27.98 kg/m²   06/23/23 28.07 kg/m²   01/12/23 29.95 kg/m²   09/13/22 27.40 kg/m²   08/18/22 28.13 kg/m²   07/14/22 28.77 kg/m²   07/13/22 28.80 kg/m²   04/14/22 28.59 kg/m²     Objective   Vital Signs  /72   Pulse 75   Temp 97.1 °F (36.2 °C)   Ht 172.7 cm (68\")   Wt 87.5 kg (193 lb)   SpO2 97%   BMI 29.35 kg/m²   Physical Exam  Constitutional:       General: He is not in acute distress.  Cardiovascular:      Rate and Rhythm: Normal rate and regular rhythm.      Pulses: Normal pulses.      Heart sounds: No murmur heard.     No friction rub. No gallop.   Pulmonary:      Effort: Pulmonary effort is normal. No respiratory distress.      Breath sounds: Normal breath sounds. No wheezing or rhonchi.   Neurological:      Mental Status: He is alert.   Psychiatric:         Mood and Affect: Mood normal.         Behavior: Behavior normal.         Thought Content: Thought content normal.         Assessment & Plan   Diagnoses and all orders for this visit:    1. Depression with anxiety (Primary)    2. Caregiver with fatigue    3. Screening for prostate cancer  -     PSA Screen    4. Wellness examination  -     CBC & Differential  -     Comprehensive Metabolic Panel  -     TSH  -     Lipid Panel    5. Need for Streptococcus pneumoniae vaccination  -     Pneumococcal Conjugate Vaccine 20-Valent (PCV20)    Discussions & Anticipatory Guidance:  Advised and educated plan of care.      The patient will Return in about 6 months (around 11/30/2024) for follow-Up.    Electronically signed by RIRI Hall, 05/31/24, 8:01 AM CDT.  "

## 2024-06-01 LAB
ALBUMIN SERPL-MCNC: 4.3 G/DL (ref 3.5–5.2)
ALBUMIN/GLOB SERPL: 2.3 G/DL
ALP SERPL-CCNC: 69 U/L (ref 39–117)
ALT SERPL-CCNC: 15 U/L (ref 1–41)
AST SERPL-CCNC: 20 U/L (ref 1–40)
BASOPHILS # BLD AUTO: 0.07 10*3/MM3 (ref 0–0.2)
BASOPHILS NFR BLD AUTO: 1.2 % (ref 0–1.5)
BILIRUB SERPL-MCNC: 0.7 MG/DL (ref 0–1.2)
BUN SERPL-MCNC: 21 MG/DL (ref 6–20)
BUN/CREAT SERPL: 19.8 (ref 7–25)
CALCIUM SERPL-MCNC: 9.2 MG/DL (ref 8.6–10.5)
CHLORIDE SERPL-SCNC: 107 MMOL/L (ref 98–107)
CHOLEST SERPL-MCNC: 217 MG/DL (ref 0–200)
CO2 SERPL-SCNC: 23.8 MMOL/L (ref 22–29)
CREAT SERPL-MCNC: 1.06 MG/DL (ref 0.76–1.27)
EGFRCR SERPLBLD CKD-EPI 2021: 80.8 ML/MIN/1.73
EOSINOPHIL # BLD AUTO: 0.38 10*3/MM3 (ref 0–0.4)
EOSINOPHIL NFR BLD AUTO: 6.8 % (ref 0.3–6.2)
ERYTHROCYTE [DISTWIDTH] IN BLOOD BY AUTOMATED COUNT: 13 % (ref 12.3–15.4)
GLOBULIN SER CALC-MCNC: 1.9 GM/DL
GLUCOSE SERPL-MCNC: 132 MG/DL (ref 65–99)
HCT VFR BLD AUTO: 45.9 % (ref 37.5–51)
HDLC SERPL-MCNC: 46 MG/DL (ref 40–60)
HGB BLD-MCNC: 15.6 G/DL (ref 13–17.7)
IMM GRANULOCYTES # BLD AUTO: 0.02 10*3/MM3 (ref 0–0.05)
IMM GRANULOCYTES NFR BLD AUTO: 0.4 % (ref 0–0.5)
LDLC SERPL CALC-MCNC: 142 MG/DL (ref 0–100)
LYMPHOCYTES # BLD AUTO: 1.51 10*3/MM3 (ref 0.7–3.1)
LYMPHOCYTES NFR BLD AUTO: 26.9 % (ref 19.6–45.3)
MCH RBC QN AUTO: 30.2 PG (ref 26.6–33)
MCHC RBC AUTO-ENTMCNC: 34 G/DL (ref 31.5–35.7)
MCV RBC AUTO: 89 FL (ref 79–97)
MONOCYTES # BLD AUTO: 0.4 10*3/MM3 (ref 0.1–0.9)
MONOCYTES NFR BLD AUTO: 7.1 % (ref 5–12)
NEUTROPHILS # BLD AUTO: 3.24 10*3/MM3 (ref 1.7–7)
NEUTROPHILS NFR BLD AUTO: 57.6 % (ref 42.7–76)
NRBC BLD AUTO-RTO: 0 /100 WBC (ref 0–0.2)
PLATELET # BLD AUTO: 288 10*3/MM3 (ref 140–450)
POTASSIUM SERPL-SCNC: 4.6 MMOL/L (ref 3.5–5.2)
PROT SERPL-MCNC: 6.2 G/DL (ref 6–8.5)
PSA SERPL-MCNC: 0.93 NG/ML (ref 0–4)
RBC # BLD AUTO: 5.16 10*6/MM3 (ref 4.14–5.8)
SODIUM SERPL-SCNC: 141 MMOL/L (ref 136–145)
TRIGL SERPL-MCNC: 162 MG/DL (ref 0–150)
TSH SERPL DL<=0.005 MIU/L-ACNC: 0.99 UIU/ML (ref 0.27–4.2)
VLDLC SERPL CALC-MCNC: 29 MG/DL (ref 5–40)
WBC # BLD AUTO: 5.62 10*3/MM3 (ref 3.4–10.8)

## 2024-06-03 LAB
HBA1C MFR BLD: 4.9 % (ref 4.8–5.6)
WRITTEN AUTHORIZATION: NORMAL

## 2024-06-03 NOTE — PROGRESS NOTES
Need A1C added on - elevated glucose.    Electronically signed by RIRI Hall, 06/03/24, 9:36 AM CDT.

## 2024-06-03 NOTE — PROGRESS NOTES
Reviewed results - [employer] message sent.  If not seen in 3 days (3 day alert set), will send to pool to call the message.      Electronically signed by RIRI Hall, 06/03/24, 2:25 PM CDT.

## 2024-06-04 DIAGNOSIS — E78.5 HYPERLIPIDEMIA, UNSPECIFIED HYPERLIPIDEMIA TYPE: Primary | ICD-10-CM

## 2024-06-04 RX ORDER — ROSUVASTATIN CALCIUM 20 MG/1
20 TABLET, COATED ORAL NIGHTLY
Qty: 30 TABLET | Refills: 5 | Status: SHIPPED | OUTPATIENT
Start: 2024-06-04

## 2024-06-10 ENCOUNTER — TELEPHONE (OUTPATIENT)
Dept: FAMILY MEDICINE CLINIC | Facility: CLINIC | Age: 59
End: 2024-06-10

## 2024-06-10 ENCOUNTER — TELEPHONE (OUTPATIENT)
Dept: OTOLARYNGOLOGY | Facility: CLINIC | Age: 59
End: 2024-06-10
Payer: COMMERCIAL

## 2024-06-10 ENCOUNTER — APPOINTMENT (OUTPATIENT)
Dept: GENERAL RADIOLOGY | Facility: HOSPITAL | Age: 59
End: 2024-06-10
Payer: COMMERCIAL

## 2024-06-10 PROCEDURE — 70220 X-RAY EXAM OF SINUSES: CPT

## 2024-06-10 NOTE — TELEPHONE ENCOUNTER
called and schedule with Pt for 06/11/2024 with LETICIAP for smelly drainage, Pt was going to UC as I got him schedule for appt.

## 2024-06-11 ENCOUNTER — OFFICE VISIT (OUTPATIENT)
Dept: OTOLARYNGOLOGY | Facility: CLINIC | Age: 59
End: 2024-06-11
Payer: COMMERCIAL

## 2024-06-11 VITALS — WEIGHT: 193 LBS | BODY MASS INDEX: 29.25 KG/M2 | TEMPERATURE: 97.5 F | HEIGHT: 68 IN

## 2024-06-11 DIAGNOSIS — J32.9 CHRONIC SINUSITIS, UNSPECIFIED LOCATION: Primary | ICD-10-CM

## 2024-06-11 RX ORDER — METHYLPREDNISOLONE 4 MG/1
TABLET ORAL
Qty: 1 EACH | Refills: 0 | Status: SHIPPED | OUTPATIENT
Start: 2024-06-11 | End: 2024-06-17

## 2024-06-11 RX ORDER — AMOXICILLIN AND CLAVULANATE POTASSIUM 875; 125 MG/1; MG/1
1 TABLET, FILM COATED ORAL EVERY 12 HOURS SCHEDULED
Qty: 20 TABLET | Refills: 0 | Status: SHIPPED | OUTPATIENT
Start: 2024-06-11 | End: 2024-06-21

## 2024-06-11 NOTE — PROGRESS NOTES
RIRI Villatoro ENT [facility]  [address], [address]  [place] [address]  Fax [phone]  Phone [phone]      Visit Type: FOLLOW UP   Chief Complaint   Patient presents with    Sinus Problem     Nasal drainage, malodorous           HPI  He presents for a follow up evaluation. He has had a recent flair up of purulent nasal drainage, sinus pressure, and malosmia. The symptoms are localized to the right side. The symptoms severity was described as : moderate The symptoms have been : relatively constant for the last several days There have been no identified factors that aggravate the symptoms. The symptoms are improved by Augmentin and nasal rinses.      Past Medical History:   Diagnosis Date    Abnormal CT of paranasal sinuses 08/20/2015    (Note 1 of 3)  NASAL SEPTUM:  The nasal septum is relatively midline.  MAXILLARY SINUSES:  The left maxillary sinus showed >5 m mucosal thickening and 50% opacification and the right maxillary sinus showed >5 mm mucosal thickening. The osteomeatal complex is obstructed bilaterally.    Abnormal CT of paranasal sinuses 08/20/2015    (Note 2 of 3)  NASAL TURBINATES: The inferior turbinates showed bilateral hypertrophy. The Cherie bullosa are not present.  ETHMOID SINUSES: The left ethmoid sinus showed >3 mm mucosal thickening and complete opacification. The lamina papyracea appears intact bilaterally.    Abnormal CT of paranasal sinuses 08/20/2015    (Note 3 of 3)  FRONTAL SINUSES:  The left frontal sinus showed complete opacification. The right frontal sinus showed complete opacification. SPENOID SINUSES: The left sphenoid sinus showed moderate mucosal thickening. The right sphenoid sinus showed moderate mucosal thickening.    Allergic rhinitis 12/01/2015    Chronic rhinitis     Chronic sinusitis     Disturbances of sensation of smell and taste 12/01/2015    Diverticulosis     History of  adenomatous polyp of colon     History of colon polyps     Sensorineural hearing loss, bilateral 12/01/2015    Subjective tinnitus 12/01/2015       Past Surgical History:   Procedure Laterality Date    COLONOSCOPY  12/09/2013    Dr. Chavez-Polyp; Repeat 5 years (Abstracted from PCP's note)    COLONOSCOPY N/A 9/24/2021    One 6mm tubular adenomatous polyp in the ascending colon-Resected and retrieved-Clip (MR conditional) was placed; Diverticulosis in the left colon; Non-bleeding internal hemorrhoids; Repeat 5 years     ENDOSCOPY  08/14/2015    Dr. Mullins-Gastric ulcer; Repeat 8 weeks (Abstracted from PCP's note)    ENDOSCOPY  10/16/2015    Dr. Mullins-Dr. Mullins (Abstracted from PCP's note)    ENDOSCOPY N/A 10/15/2021    Procedure: ESOPHAGOGASTRODUODENOSCOPY WITH ANESTHESIA;  Surgeon: Isaura Fragoso MD;  Location: [facility] ENDOSCOPY;  Service: Gastroenterology;  Laterality: N/A;  pre screen  post balloon  Dr. Pedersen    FRACTURE SURGERY      FUNCTIONAL ENDOSCOPIC SINUS SURGERY  07/26/2016    HEMORRHOIDECTOMY      KNEE ARTHROPLASTY, PARTIAL REPLACEMENT Left     KNEE MENISCECTOMY Right     X 2    OTHER SURGICAL HISTORY      Open Reduction-Internal Fixation    ROTATOR CUFF REPAIR Right     SEPTOPLASTY      Per Dr. Urrutia 7-8 years ago       Family History: His family history includes Diabetes in an other family member; No Known Problems in his father and mother.     Social History: He  reports that he has never smoked. He has never been exposed to tobacco smoke. He has never used smokeless tobacco. He reports current alcohol use. He reports that he does not use drugs.    Home Medications:  Diclofenac Sodium, albuterol sulfate HFA, amoxicillin-clavulanate, cetirizine, desvenlafaxine, fluticasone, methylPREDNISolone, methylphenidate, montelukast, and rosuvastatin    Allergies:  He has No Known Allergies.       Vital Signs:   Temp:  [97.5 °F (36.4 °C)-98.2 °F (36.8 °C)] 97.5 °F (36.4 °C)  Heart Rate:  [98] 98  Resp:   [20] 20  BP: (126)/(78) 126/78  ENT Physical Exam  Nose  External Nose: nares patent bilaterally; external nose normal;  Internal Nose: nasal mucosa normal; septum normal; bilateral inferior turbinates normal;       Nasal endoscopy    Date/Time: 6/11/2024 12:59 PM    Performed by: Patricia Farris APRN  Authorized by: Patricia Farris APRN    Procedure details:     Indications: sino-nasal symptoms      Medication:  Afrin    Instrument: flexible nasal nasal endoscope      Scope location: bilateral nare    Nasal cavity:     Right inferior turbinates: normal    Septum:     Findings: normal    Sinus/ Nasopharynx:     Right middle meatus: pus      Left middle meatus: normal      purulence      Left nasopharynx: normal      Right eustachian tube: normal      Left eustachian tube: normal    Post-procedure details:     Patient tolerance of procedure:  Tolerated well     Result Review       RESULTS REVIEW    I have reviewed the patients old records in the chart.   The following results/records were reviewed:   XR Sinuses 3+ View (06/10/2024 16:01)   Urgent Care Provider Note by Nick Riojas MD (06/10/2024 15:46)        Assessment & Plan  Chronic sinusitis, unspecified location           New Medications Ordered This Visit   Medications    methylPREDNISolone (Medrol) 4 MG dose pack     Sig: Take as directed on package instructions.     Dispense:  1 each     Refill:  0    amoxicillin-clavulanate (AUGMENTIN) 875-125 MG per tablet     Sig: Take 1 tablet by mouth Every 12 (Twelve) Hours for 10 days.     Dispense:  20 tablet     Refill:  0     He was prescribed 10 day course of augmentin at urgent care.  Instructed patient to complete 10 day and continue with 10 day course I have prescribed for a total of 20 day course.  Also added medrol. Encouraged patient to eat yogurt while on long course antibiotics.   No follow-ups on file.        Electronically signed by RIRI Villatoro 06/11/24 1:00 PM CDT.

## 2024-06-12 DIAGNOSIS — R41.840 POOR CONCENTRATION: ICD-10-CM

## 2024-06-12 NOTE — TELEPHONE ENCOUNTER
Pt was called to schedule an appointment. Pt stated he no longer needs one due to seeing his ENT yesterday.

## 2024-06-12 NOTE — TELEPHONE ENCOUNTER
Happy to see patient if needed but he is seeing ENT for this today I believe.    Electronically signed by RIRI Hall, 06/12/24, 8:10 AM CDT.

## 2024-06-12 NOTE — TELEPHONE ENCOUNTER
Rx Refill Note  Requested Prescriptions     Pending Prescriptions Disp Refills    desvenlafaxine (Pristiq) 50 MG 24 hr tablet 30 tablet 5     Sig: Take 1 tablet by mouth Daily.    methylphenidate (Concerta) 36 MG CR tablet 30 tablet 0     Sig: Take 1 tablet by mouth Every Morning      Last office visit with office: 05/31/2024  Next office visit with office: NONE    UDS: NONE    DATE OF LAST REFILL: 05/15/2024    Controlled Substance Agreement: not up to date    ALECIA OR LIANET: BREANNA         {TIP  Is Refill Pharmacy correct?:  Chioma Haji MA  06/12/24, 16:07 CDT

## 2024-06-13 RX ORDER — METHYLPHENIDATE HYDROCHLORIDE 36 MG/1
36 TABLET ORAL EVERY MORNING
Qty: 30 TABLET | Refills: 0 | Status: SHIPPED | OUTPATIENT
Start: 2024-06-13

## 2024-06-13 RX ORDER — DESVENLAFAXINE SUCCINATE 50 MG/1
50 TABLET, EXTENDED RELEASE ORAL DAILY
Qty: 30 TABLET | Refills: 5 | Status: SHIPPED | OUTPATIENT
Start: 2024-06-13

## 2024-07-17 DIAGNOSIS — R41.840 POOR CONCENTRATION: ICD-10-CM

## 2024-07-17 RX ORDER — METHYLPHENIDATE HYDROCHLORIDE 36 MG/1
36 TABLET ORAL EVERY MORNING
Qty: 30 TABLET | Refills: 0 | Status: SHIPPED | OUTPATIENT
Start: 2024-07-17

## 2024-07-17 NOTE — TELEPHONE ENCOUNTER
Rx Refill Note  Requested Prescriptions     Pending Prescriptions Disp Refills    methylphenidate (Concerta) 36 MG CR tablet 30 tablet 0     Sig: Take 1 tablet by mouth Every Morning      Last office visit with office: 05/31/2024  Next office visit with office: NONE    UDS: NONE    DATE OF LAST REFILL: 06/13/2024    Controlled Substance Agreement: up to date    ALECIA OR ANGELINEP: WNL         {TIP  Is Refill Pharmacy correct?:  Chioma Haji MA  07/17/24, 07:20 CDT

## 2024-07-29 ENCOUNTER — OFFICE VISIT (OUTPATIENT)
Dept: FAMILY MEDICINE CLINIC | Facility: CLINIC | Age: 59
End: 2024-07-29
Payer: COMMERCIAL

## 2024-07-29 VITALS
OXYGEN SATURATION: 97 % | DIASTOLIC BLOOD PRESSURE: 74 MMHG | BODY MASS INDEX: 29.7 KG/M2 | WEIGHT: 196 LBS | HEIGHT: 68 IN | SYSTOLIC BLOOD PRESSURE: 140 MMHG | HEART RATE: 83 BPM

## 2024-07-29 DIAGNOSIS — L08.9 FINGER INFECTION: Primary | ICD-10-CM

## 2024-07-29 DIAGNOSIS — Z23 NEED FOR TETANUS, DIPHTHERIA, AND ACELLULAR PERTUSSIS (TDAP) VACCINE: ICD-10-CM

## 2024-07-29 PROCEDURE — 90715 TDAP VACCINE 7 YRS/> IM: CPT | Performed by: FAMILY MEDICINE

## 2024-07-29 PROCEDURE — 90471 IMMUNIZATION ADMIN: CPT | Performed by: FAMILY MEDICINE

## 2024-07-29 PROCEDURE — 99213 OFFICE O/P EST LOW 20 MIN: CPT | Performed by: FAMILY MEDICINE

## 2024-07-29 RX ORDER — CLINDAMYCIN HYDROCHLORIDE 300 MG/1
300 CAPSULE ORAL 3 TIMES DAILY
Qty: 30 CAPSULE | Refills: 0 | Status: SHIPPED | OUTPATIENT
Start: 2024-07-29

## 2024-07-29 NOTE — PROGRESS NOTES
JUAN”.   Subjective   Selvin Garcia is a 58 y.o. male presenting with chief complaint of:   Chief Complaint   Patient presents with    Hand Pain     Cut middle finger on left hand a week ago.  Pt states cut took a while to heal but now swelling and painful, joints stiff.     AWV NA  Last Completed Annual Wellness Visit       This patient has no relevant Health Maintenance data.             History of Present Illness :  Alone.  Here for primarily an acute issue today; above.   Cut in dirty water on sharp piece of metal; no fb suspect.  9 days ago.     Has multiple chronic problems to consider that might have a bearing on today's issues; not an interval appointment.       Chronic/acute problems reviewed today:   1. Finger infection      Has an/another acute issue today: none.    The following portions of the patient's history were reviewed and updated as appropriate: allergies, current medications, past family history, past medical history, past social history, past surgical history, and problem list.      Current Outpatient Medications:     albuterol sulfate  (90 Base) MCG/ACT inhaler, Inhale 2 puffs Every 4 (Four) Hours As Needed for Wheezing or Shortness of Air., Disp: 8 g, Rfl: 0    cetirizine (zyrTEC) 10 MG tablet, TAKE 1 TABLET DAILY, Disp: 90 tablet, Rfl: 3    desvenlafaxine (Pristiq) 50 MG 24 hr tablet, Take 1 tablet by mouth Daily., Disp: 30 tablet, Rfl: 5    Diclofenac Sodium (VOLTAREN) 1 % gel gel, Apply 4 g topically to the appropriate area as directed 4 (Four) Times a Day As Needed (sore joint)., Disp: , Rfl:     methylphenidate (Concerta) 36 MG CR tablet, Take 1 tablet by mouth Every Morning, Disp: 30 tablet, Rfl: 0    montelukast (SINGULAIR) 10 MG tablet, TAKE 1 TABLET EVERY NIGHT, Disp: 90 tablet, Rfl: 3    rosuvastatin (Crestor) 20 MG tablet, Take 1 tablet by mouth Every Night., Disp: 30 tablet, Rfl: 5    fluticasone (FLONASE) 50 MCG/ACT nasal spray, 2 sprays into the nostril(s) as  directed by provider Daily for 90 days., Disp: 48 g, Rfl: 3    No problems with medications.    No Known Allergies    Review of Systems   Constitutional:  Negative for activity change, appetite change, chills and fever.   Musculoskeletal:         Stiff/sore L middle finger     Copy/paste function used for ROS/exam AND each area of these were reviewed, updated, confirmed and supplemented as needed.  Data reviewed:   Recent admit/ER/MD visits: none  Last cardiac testing:   Echo: none  Radiology considered:   XR Sinuses 3+ View    Result Date: 6/10/2024  Acute on chronic right maxillary sinusitis. Questionable chronic bilateral frontal sinusitis.  This report was signed and finalized on 6/10/2024 4:08 PM by Dr. Tyson Davalos MD.       Lab Results:  Results for orders placed or performed in visit on 05/31/24   PSA Screen    Specimen: Blood   Result Value Ref Range    PSA 0.928 0.000 - 4.000 ng/mL   Comprehensive Metabolic Panel    Specimen: Blood   Result Value Ref Range    Glucose 132 (H) 65 - 99 mg/dL    BUN 21 (H) 6 - 20 mg/dL    Creatinine 1.06 0.76 - 1.27 mg/dL    EGFR Result 80.8 >60.0 mL/min/1.73    BUN/Creatinine Ratio 19.8 7.0 - 25.0    Sodium 141 136 - 145 mmol/L    Potassium 4.6 3.5 - 5.2 mmol/L    Chloride 107 98 - 107 mmol/L    Total CO2 23.8 22.0 - 29.0 mmol/L    Calcium 9.2 8.6 - 10.5 mg/dL    Total Protein 6.2 6.0 - 8.5 g/dL    Albumin 4.3 3.5 - 5.2 g/dL    Globulin 1.9 gm/dL    A/G Ratio 2.3 g/dL    Total Bilirubin 0.7 0.0 - 1.2 mg/dL    Alkaline Phosphatase 69 39 - 117 U/L    AST (SGOT) 20 1 - 40 U/L    ALT (SGPT) 15 1 - 41 U/L   TSH    Specimen: Blood   Result Value Ref Range    TSH 0.990 0.270 - 4.200 uIU/mL   Lipid Panel    Specimen: Blood   Result Value Ref Range    Total Cholesterol 217 (H) 0 - 200 mg/dL    Triglycerides 162 (H) 0 - 150 mg/dL    HDL Cholesterol 46 40 - 60 mg/dL    VLDL Cholesterol [place] 29 5 - 40 mg/dL    LDL Chol Calc (NIH) 142 (H) 0 - 100 mg/dL   Hemoglobin A1c   Result Value  Ref Range    Hemoglobin A1C 4.90 4.80 - 5.60 %   Written Authorization   Result Value Ref Range    Written Authorization Comment    CBC & Differential    Specimen: Blood   Result Value Ref Range    WBC 5.62 3.40 - 10.80 10*3/mm3    RBC 5.16 4.14 - 5.80 10*6/mm3    Hemoglobin 15.6 13.0 - 17.7 g/dL    Hematocrit 45.9 37.5 - 51.0 %    MCV 89.0 79.0 - 97.0 fL    MCH 30.2 26.6 - 33.0 pg    MCHC 34.0 31.5 - 35.7 g/dL    RDW 13.0 12.3 - 15.4 %    Platelets 288 140 - 450 10*3/mm3    Neutrophil Rel % 57.6 42.7 - 76.0 %    Lymphocyte Rel % 26.9 19.6 - 45.3 %    Monocyte Rel % 7.1 5.0 - 12.0 %    Eosinophil Rel % 6.8 (H) 0.3 - 6.2 %    Basophil Rel % 1.2 0.0 - 1.5 %    Neutrophils Absolute 3.24 1.70 - 7.00 10*3/mm3    Lymphocytes Absolute 1.51 0.70 - 3.10 10*3/mm3    Monocytes Absolute 0.40 0.10 - 0.90 10*3/mm3    Eosinophils Absolute 0.38 0.00 - 0.40 10*3/mm3    Basophils Absolute 0.07 0.00 - 0.20 10*3/mm3    Immature Granulocyte Rel % 0.4 0.0 - 0.5 %    Immature Grans Absolute 0.02 0.00 - 0.05 10*3/mm3    nRBC 0.0 0.0 - 0.2 /100 WBC       A1C:  Lab Results - Last 18 Months   Lab Units 05/31/24  0721   HEMOGLOBIN A1C % 4.90     GLUCOSE:  Lab Results - Last 18 Months   Lab Units 05/31/24  0721 07/17/23  1411   GLUCOSE mg/dL 132* 114*     LIPID:  Lab Results - Last 18 Months   Lab Units 05/31/24  0721   CHOLESTEROL mg/dL 217*   LDL CHOL mg/dL 142*   HDL CHOL mg/dL 46   TRIGLYCERIDES mg/dL 162*     PSA:  Lab Results   Component Value Date/Time    PSA 0.928 05/31/2024 07:21 AM    PSA 0.814 09/02/2022 07:40 AM    PSA 0.761 03/24/2021 07:22 AM    PSA 0.490 03/21/2019 07:14 AM       CBC:  Lab Results - Last 18 Months   Lab Units 05/31/24  0721 07/17/23  1411   WBC 10*3/mm3 5.62 6.97   HEMOGLOBIN g/dL 15.6 15.7   HEMATOCRIT % 45.9 45.8   PLATELETS 10*3/mm3 288 311     BMP/CMP:  Lab Results - Last 18 Months   Lab Units 05/31/24  0721 07/17/23  1411   SODIUM mmol/L 141 141   POTASSIUM mmol/L 4.6 4.5   CHLORIDE mmol/L 107 105   CO2  "mmol/L 23.8 23.0   GLUCOSE mg/dL 132* 114*   BUN mg/dL 21* 21*   CREATININE mg/dL 1.06 0.92   EGFR RESULT mL/min/1.73 80.8  --    CALCIUM mg/dL 9.2 9.3       HEPATIC:  Lab Results - Last 18 Months   Lab Units 05/31/24  0721 07/17/23  1411   ALT (SGPT) U/L 15 15   AST (SGOT) U/L 20 22   ALK PHOS U/L 69 80     HEPATITIS C ANTIBODY:   Lab Results   Component Value Date/Time    HEPCVIRUSABY <0.1 04/10/2019 02:59 PM     Vit D:No results for input(s): \"XDBL14CK\" in the last 20761 hours.  THYROID:  Lab Results - Last 18 Months   Lab Units 05/31/24  0721   TSH uIU/mL 0.990       Objective   /74   Pulse 83   Ht 172.7 cm (68\")   Wt 88.9 kg (196 lb)   SpO2 97%   BMI 29.80 kg/m²   Body mass index is 29.8 kg/m².    Recent Vitals         6/10/2024 6/11/2024 7/29/2024       BP: 126/78 -- 140/74     Pulse: 98 -- 83     Temp: 98.2 °F (36.8 °C) 97.5 °F (36.4 °C) --     Weight: 87.1 kg (192 lb) 87.5 kg (193 lb) 88.9 kg (196 lb)     BMI (Calculated): 29.2 29.4 29.8             Physical Exam  Vitals reviewed.   Constitutional:       General: He is not in acute distress.  Musculoskeletal:         General: Swelling and tenderness present. No deformity.      Comments: L middle finger limited flexion 50% guess    Neurological:      Mental Status: He is alert.             Assessment & Plan     1. Finger infection        Data review above:   Discussions/medical decisions/reviews:  Recent Vitals         6/10/2024 6/11/2024 7/29/2024       BP: 126/78 -- 140/74     Pulse: 98 -- 83     Temp: 98.2 °F (36.8 °C) 97.5 °F (36.4 °C) --     Weight: 87.1 kg (192 lb) 87.5 kg (193 lb) 88.9 kg (196 lb)     BMI (Calculated): 29.2 29.4 29.8             Wt Readings from Last 15 Encounters:   07/29/24 0835 88.9 kg (196 lb)   06/11/24 1107 87.5 kg (193 lb)   06/10/24 1522 87.1 kg (192 lb)   05/31/24 0752 87.5 kg (193 lb)   04/10/24 1018 85.8 kg (189 lb 3.2 oz)   07/17/23 1336 83.5 kg (184 lb)   06/23/23 1552 83.7 kg (184 lb 9.6 oz)   01/12/23 1548 " "89.4 kg (197 lb)   09/13/22 0904 81.7 kg (180 lb 3.2 oz)   08/18/22 1546 83.9 kg (185 lb)   07/14/22 1512 85.8 kg (189 lb 3.2 oz)   07/13/22 1423 85.9 kg (189 lb 6.4 oz)   04/14/22 1303 85.3 kg (188 lb)   03/10/22 1302 83.3 kg (183 lb 9.6 oz)   03/02/22 1620 84.8 kg (187 lb)       BP ok  Other vitals ok  Weight stable pattern    Vaccines discussed (see below) Tdap due anyway  Cleocin  Report any worsening; anytime  Report no better 24-58 hrs  Advised could need hand surgeon; even emergently if apprciably worsened.     Follow up: No follow-ups on file.  No future appointments.    Data review above:   Rx: reviewed and decisions:   Rx new/changes:  New Medications Ordered This Visit   Medications    clindamycin (Cleocin) 300 MG capsule     Sig: Take 1 capsule by mouth 3 (Three) Times a Day.     Dispense:  30 capsule     Refill:  0       Orders placed:   LAB/Testing/Referrals: reviewed/orders:   Today:   No orders of the defined types were placed in this encounter.    Chronic/recurrent labs above or change to:   Same     Immunization History   Administered Date(s) Administered    COVID-19 (MODERNA) 1st,2nd,3rd Dose Monovalent 01/11/2021, 02/01/2021, 11/02/2021    Flu Vaccine Intradermal Quad 18-64YR 01/14/2020    Fluzone (or Fluarix & Flulaval for VFC) >6mos 11/01/2021    Pneumococcal Conjugate 20-Valent (PCV20) 05/31/2024    flucelvax quad pfs =>4 YRS 01/14/2020     We advised/reaffirmed our support/suggestion for staying complete with covid- covid boosters, seasonal flu/yearly and any missing vaccine from list we supplied; when cannot be given here we suggest contact with local health department office or pharmacy to review missing/needed vaccines and then bring nursing documentation for these vaccines to this office or call this information in. Shingles became \"free\" 1.1.23 for medicare insurance.    Health maintenance:   Body mass index is 29.8 kg/m².         Tobacco use reviewed:     Selvin Garcia  reports that " he has never smoked. He has never been exposed to tobacco smoke. He has never used smokeless tobacco.       There are no Patient Instructions on file for this visit.

## 2024-07-30 ENCOUNTER — TELEPHONE (OUTPATIENT)
Dept: FAMILY MEDICINE CLINIC | Facility: CLINIC | Age: 59
End: 2024-07-30
Payer: COMMERCIAL

## 2024-07-30 DIAGNOSIS — L08.9 FINGER INFECTION: Primary | ICD-10-CM

## 2024-07-30 NOTE — TELEPHONE ENCOUNTER
Patient contacted office with concern despite cleocin more pain, swelling and less movement of infected finger    Staff to get same day (TODAY)apt with [facility] staff     Keep patient informed of process

## 2024-07-30 NOTE — TELEPHONE ENCOUNTER
"I spoke to patient and let him know we were waiting for a return call from dr mohit patterson and that I do no have time frame of when I will get a call back. Patient expressed his finger is still getting worse and \"feels like it could bust\". I recommended patient to go to ER now for evaluation due to not knowing if Dr Mohit patterson will be able to get him in today. Patient stated understanding and will go to [facility] ER   "

## 2024-07-30 NOTE — TELEPHONE ENCOUNTER
Contacted Dr Miller office was sent to  by staff after requesting same day appointment for finger infection.

## 2024-07-30 NOTE — TELEPHONE ENCOUNTER
Dr Miller office called back and stated they could see patient 07/30/24 1:10pm. Spoke to Dr Pedersen and Dr. Pedersen stated ok for patient to wait for 1:10 appointment and not go to ER. Patient notified and stated understanding.

## 2024-07-31 ENCOUNTER — ANESTHESIA EVENT (OUTPATIENT)
Dept: PERIOP | Facility: HOSPITAL | Age: 59
End: 2024-07-31
Payer: COMMERCIAL

## 2024-07-31 ENCOUNTER — ANESTHESIA (OUTPATIENT)
Dept: PERIOP | Facility: HOSPITAL | Age: 59
End: 2024-07-31
Payer: COMMERCIAL

## 2024-07-31 ENCOUNTER — HOSPITAL ENCOUNTER (OUTPATIENT)
Facility: HOSPITAL | Age: 59
Setting detail: HOSPITAL OUTPATIENT SURGERY
Discharge: HOME | End: 2024-07-31
Attending: ORTHOPAEDIC SURGERY | Admitting: ORTHOPAEDIC SURGERY
Payer: COMMERCIAL

## 2024-07-31 VITALS
TEMPERATURE: 97.6 F | HEART RATE: 55 BPM | RESPIRATION RATE: 18 BRPM | SYSTOLIC BLOOD PRESSURE: 134 MMHG | BODY MASS INDEX: 28.11 KG/M2 | DIASTOLIC BLOOD PRESSURE: 75 MMHG | OXYGEN SATURATION: 98 % | HEIGHT: 69 IN | WEIGHT: 189.82 LBS

## 2024-07-31 DIAGNOSIS — L03.012 CELLULITIS OF FINGER, LEFT: ICD-10-CM

## 2024-07-31 LAB
ANION GAP SERPL CALCULATED.3IONS-SCNC: 6 MMOL/L (ref 5–15)
BUN SERPL-MCNC: 24 MG/DL (ref 6–20)
BUN/CREAT SERPL: 27.6 (ref 7–25)
CALCIUM SPEC-SCNC: 8.9 MG/DL (ref 8.6–10.5)
CHLORIDE SERPL-SCNC: 107 MMOL/L (ref 98–107)
CO2 SERPL-SCNC: 23 MMOL/L (ref 22–29)
CREAT SERPL-MCNC: 0.87 MG/DL (ref 0.76–1.27)
DEPRECATED RDW RBC AUTO: 39.3 FL (ref 37–54)
EGFRCR SERPLBLD CKD-EPI 2021: 99.4 ML/MIN/1.73
ERYTHROCYTE [DISTWIDTH] IN BLOOD BY AUTOMATED COUNT: 12.7 % (ref 12.3–15.4)
GLUCOSE SERPL-MCNC: 97 MG/DL (ref 65–99)
HCT VFR BLD AUTO: 43.8 % (ref 37.5–51)
HGB BLD-MCNC: 15.6 G/DL (ref 13–17.7)
MCH RBC QN AUTO: 30.5 PG (ref 26.6–33)
MCHC RBC AUTO-ENTMCNC: 35.6 G/DL (ref 31.5–35.7)
MCV RBC AUTO: 85.7 FL (ref 79–97)
PLATELET # BLD AUTO: 271 10*3/MM3 (ref 140–450)
PMV BLD AUTO: 9.2 FL (ref 6–12)
POTASSIUM SERPL-SCNC: 4.3 MMOL/L (ref 3.5–5.2)
RBC # BLD AUTO: 5.11 10*6/MM3 (ref 4.14–5.8)
SODIUM SERPL-SCNC: 136 MMOL/L (ref 136–145)
WBC NRBC COR # BLD AUTO: 5.71 10*3/MM3 (ref 3.4–10.8)

## 2024-07-31 PROCEDURE — 25010000002 PROPOFOL 10 MG/ML EMULSION

## 2024-07-31 PROCEDURE — 25010000002 FENTANYL CITRATE (PF) 100 MCG/2ML SOLUTION

## 2024-07-31 PROCEDURE — 25810000003 LACTATED RINGERS PER 1000 ML: Performed by: ORTHOPAEDIC SURGERY

## 2024-07-31 PROCEDURE — 25010000002 ONDANSETRON PER 1 MG

## 2024-07-31 PROCEDURE — 87205 SMEAR GRAM STAIN: CPT | Performed by: ORTHOPAEDIC SURGERY

## 2024-07-31 PROCEDURE — 87070 CULTURE OTHR SPECIMN AEROBIC: CPT | Performed by: ORTHOPAEDIC SURGERY

## 2024-07-31 PROCEDURE — 25010000002 GLYCOPYRROLATE 0.4 MG/2ML SOLUTION

## 2024-07-31 PROCEDURE — 87075 CULTR BACTERIA EXCEPT BLOOD: CPT | Performed by: ORTHOPAEDIC SURGERY

## 2024-07-31 PROCEDURE — 80048 BASIC METABOLIC PNL TOTAL CA: CPT | Performed by: ORTHOPAEDIC SURGERY

## 2024-07-31 PROCEDURE — 25010000002 DEXAMETHASONE PER 1 MG

## 2024-07-31 PROCEDURE — 25010000002 CEFAZOLIN PER 500 MG: Performed by: ORTHOPAEDIC SURGERY

## 2024-07-31 PROCEDURE — 25010000002 MIDAZOLAM PER 1MG: Performed by: ANESTHESIOLOGY

## 2024-07-31 PROCEDURE — 85027 COMPLETE CBC AUTOMATED: CPT | Performed by: ORTHOPAEDIC SURGERY

## 2024-07-31 RX ORDER — FENTANYL CITRATE 50 UG/ML
INJECTION, SOLUTION INTRAMUSCULAR; INTRAVENOUS AS NEEDED
Status: DISCONTINUED | OUTPATIENT
Start: 2024-07-31 | End: 2024-07-31 | Stop reason: SURG

## 2024-07-31 RX ORDER — DROPERIDOL 2.5 MG/ML
0.62 INJECTION, SOLUTION INTRAMUSCULAR; INTRAVENOUS ONCE AS NEEDED
Status: DISCONTINUED | OUTPATIENT
Start: 2024-07-31 | End: 2024-07-31 | Stop reason: HOSPADM

## 2024-07-31 RX ORDER — SODIUM CHLORIDE, SODIUM LACTATE, POTASSIUM CHLORIDE, CALCIUM CHLORIDE 600; 310; 30; 20 MG/100ML; MG/100ML; MG/100ML; MG/100ML
1000 INJECTION, SOLUTION INTRAVENOUS CONTINUOUS
Status: DISCONTINUED | OUTPATIENT
Start: 2024-07-31 | End: 2024-07-31 | Stop reason: HOSPADM

## 2024-07-31 RX ORDER — MAGNESIUM HYDROXIDE 1200 MG/15ML
LIQUID ORAL AS NEEDED
Status: DISCONTINUED | OUTPATIENT
Start: 2024-07-31 | End: 2024-07-31 | Stop reason: HOSPADM

## 2024-07-31 RX ORDER — HYDROCODONE BITARTRATE AND ACETAMINOPHEN 10; 325 MG/1; MG/1
1 TABLET ORAL EVERY 4 HOURS PRN
Status: DISCONTINUED | OUTPATIENT
Start: 2024-07-31 | End: 2024-07-31 | Stop reason: HOSPADM

## 2024-07-31 RX ORDER — SODIUM CHLORIDE 0.9 % (FLUSH) 0.9 %
3 SYRINGE (ML) INJECTION EVERY 12 HOURS SCHEDULED
Status: DISCONTINUED | OUTPATIENT
Start: 2024-07-31 | End: 2024-07-31 | Stop reason: HOSPADM

## 2024-07-31 RX ORDER — SODIUM CHLORIDE 9 MG/ML
40 INJECTION, SOLUTION INTRAVENOUS AS NEEDED
Status: DISCONTINUED | OUTPATIENT
Start: 2024-07-31 | End: 2024-07-31 | Stop reason: HOSPADM

## 2024-07-31 RX ORDER — FLUMAZENIL 0.1 MG/ML
0.2 INJECTION INTRAVENOUS AS NEEDED
Status: DISCONTINUED | OUTPATIENT
Start: 2024-07-31 | End: 2024-07-31 | Stop reason: HOSPADM

## 2024-07-31 RX ORDER — ONDANSETRON 2 MG/ML
4 INJECTION INTRAMUSCULAR; INTRAVENOUS ONCE AS NEEDED
Status: DISCONTINUED | OUTPATIENT
Start: 2024-07-31 | End: 2024-07-31 | Stop reason: HOSPADM

## 2024-07-31 RX ORDER — NALOXONE HCL 0.4 MG/ML
0.4 VIAL (ML) INJECTION AS NEEDED
Status: DISCONTINUED | OUTPATIENT
Start: 2024-07-31 | End: 2024-07-31 | Stop reason: HOSPADM

## 2024-07-31 RX ORDER — PROPOFOL 10 MG/ML
VIAL (ML) INTRAVENOUS AS NEEDED
Status: DISCONTINUED | OUTPATIENT
Start: 2024-07-31 | End: 2024-07-31 | Stop reason: SURG

## 2024-07-31 RX ORDER — LIDOCAINE HYDROCHLORIDE 10 MG/ML
0.5 INJECTION, SOLUTION EPIDURAL; INFILTRATION; INTRACAUDAL; PERINEURAL ONCE AS NEEDED
Status: DISCONTINUED | OUTPATIENT
Start: 2024-07-31 | End: 2024-07-31 | Stop reason: HOSPADM

## 2024-07-31 RX ORDER — SODIUM CHLORIDE, SODIUM LACTATE, POTASSIUM CHLORIDE, CALCIUM CHLORIDE 600; 310; 30; 20 MG/100ML; MG/100ML; MG/100ML; MG/100ML
100 INJECTION, SOLUTION INTRAVENOUS CONTINUOUS
Status: DISCONTINUED | OUTPATIENT
Start: 2024-07-31 | End: 2024-07-31 | Stop reason: HOSPADM

## 2024-07-31 RX ORDER — MIDAZOLAM HYDROCHLORIDE 2 MG/2ML
1 INJECTION, SOLUTION INTRAMUSCULAR; INTRAVENOUS
Status: DISCONTINUED | OUTPATIENT
Start: 2024-07-31 | End: 2024-07-31 | Stop reason: HOSPADM

## 2024-07-31 RX ORDER — ONDANSETRON 2 MG/ML
INJECTION INTRAMUSCULAR; INTRAVENOUS AS NEEDED
Status: DISCONTINUED | OUTPATIENT
Start: 2024-07-31 | End: 2024-07-31 | Stop reason: SURG

## 2024-07-31 RX ORDER — DEXAMETHASONE SODIUM PHOSPHATE 4 MG/ML
INJECTION, SOLUTION INTRA-ARTICULAR; INTRALESIONAL; INTRAMUSCULAR; INTRAVENOUS; SOFT TISSUE AS NEEDED
Status: DISCONTINUED | OUTPATIENT
Start: 2024-07-31 | End: 2024-07-31 | Stop reason: SURG

## 2024-07-31 RX ORDER — GLYCOPYRROLATE 0.2 MG/ML
INJECTION INTRAMUSCULAR; INTRAVENOUS AS NEEDED
Status: DISCONTINUED | OUTPATIENT
Start: 2024-07-31 | End: 2024-07-31 | Stop reason: SURG

## 2024-07-31 RX ORDER — LIDOCAINE HYDROCHLORIDE 20 MG/ML
INJECTION, SOLUTION EPIDURAL; INFILTRATION; INTRACAUDAL; PERINEURAL AS NEEDED
Status: DISCONTINUED | OUTPATIENT
Start: 2024-07-31 | End: 2024-07-31 | Stop reason: SURG

## 2024-07-31 RX ORDER — IBUPROFEN 600 MG/1
600 TABLET ORAL EVERY 6 HOURS PRN
Status: DISCONTINUED | OUTPATIENT
Start: 2024-07-31 | End: 2024-07-31 | Stop reason: HOSPADM

## 2024-07-31 RX ORDER — SODIUM CHLORIDE 0.9 % (FLUSH) 0.9 %
3 SYRINGE (ML) INJECTION AS NEEDED
Status: DISCONTINUED | OUTPATIENT
Start: 2024-07-31 | End: 2024-07-31 | Stop reason: HOSPADM

## 2024-07-31 RX ORDER — FENTANYL CITRATE 50 UG/ML
50 INJECTION, SOLUTION INTRAMUSCULAR; INTRAVENOUS
Status: DISCONTINUED | OUTPATIENT
Start: 2024-07-31 | End: 2024-07-31 | Stop reason: HOSPADM

## 2024-07-31 RX ORDER — SODIUM CHLORIDE 0.9 % (FLUSH) 0.9 %
3-10 SYRINGE (ML) INJECTION AS NEEDED
Status: DISCONTINUED | OUTPATIENT
Start: 2024-07-31 | End: 2024-07-31 | Stop reason: HOSPADM

## 2024-07-31 RX ORDER — LABETALOL HYDROCHLORIDE 5 MG/ML
5 INJECTION, SOLUTION INTRAVENOUS
Status: DISCONTINUED | OUTPATIENT
Start: 2024-07-31 | End: 2024-07-31 | Stop reason: HOSPADM

## 2024-07-31 RX ORDER — ACETAMINOPHEN 500 MG
1000 TABLET ORAL ONCE
Status: COMPLETED | OUTPATIENT
Start: 2024-07-31 | End: 2024-07-31

## 2024-07-31 RX ORDER — HYDROCODONE BITARTRATE AND ACETAMINOPHEN 5; 325 MG/1; MG/1
1 TABLET ORAL EVERY 4 HOURS PRN
Status: DISCONTINUED | OUTPATIENT
Start: 2024-07-31 | End: 2024-07-31 | Stop reason: HOSPADM

## 2024-07-31 RX ADMIN — ACETAMINOPHEN 1000 MG: 500 TABLET, FILM COATED ORAL at 09:57

## 2024-07-31 RX ADMIN — LIDOCAINE HYDROCHLORIDE 100 MG: 20 INJECTION, SOLUTION EPIDURAL; INFILTRATION; INTRACAUDAL; PERINEURAL at 11:41

## 2024-07-31 RX ADMIN — FENTANYL CITRATE 100 MCG: 50 INJECTION, SOLUTION INTRAMUSCULAR; INTRAVENOUS at 11:50

## 2024-07-31 RX ADMIN — HYDROCODONE BITARTRATE AND ACETAMINOPHEN 1 TABLET: 5; 325 TABLET ORAL at 13:19

## 2024-07-31 RX ADMIN — CEFAZOLIN 2000 MG: 2 INJECTION, POWDER, FOR SOLUTION INTRAMUSCULAR; INTRAVENOUS at 11:59

## 2024-07-31 RX ADMIN — GLYCOPYRROLATE 0.2 MG: 0.2 INJECTION INTRAMUSCULAR; INTRAVENOUS at 11:54

## 2024-07-31 RX ADMIN — PROPOFOL 200 MG: 10 INJECTION, EMULSION INTRAVENOUS at 11:41

## 2024-07-31 RX ADMIN — DEXAMETHASONE SODIUM PHOSPHATE 8 MG: 4 INJECTION INTRA-ARTICULAR; INTRALESIONAL; INTRAMUSCULAR; INTRAVENOUS; SOFT TISSUE at 11:47

## 2024-07-31 RX ADMIN — SODIUM CHLORIDE, POTASSIUM CHLORIDE, SODIUM LACTATE AND CALCIUM CHLORIDE 1000 ML: 600; 310; 30; 20 INJECTION, SOLUTION INTRAVENOUS at 08:42

## 2024-07-31 RX ADMIN — ONDANSETRON 4 MG: 2 INJECTION INTRAMUSCULAR; INTRAVENOUS at 11:47

## 2024-07-31 RX ADMIN — GLYCOPYRROLATE 0.2 MG: 0.2 INJECTION INTRAMUSCULAR; INTRAVENOUS at 11:57

## 2024-07-31 RX ADMIN — MIDAZOLAM HYDROCHLORIDE 1 MG: 1 INJECTION, SOLUTION INTRAMUSCULAR; INTRAVENOUS at 10:27

## 2024-07-31 RX ADMIN — PROPOFOL 100 MG: 10 INJECTION, EMULSION INTRAVENOUS at 11:43

## 2024-07-31 NOTE — ANESTHESIA PREPROCEDURE EVALUATION
Anesthesia Evaluation     Patient summary reviewed   no history of anesthetic complications:   NPO Solid Status: > 6 hours  NPO Liquid Status: > 6 hours           Airway   Mallampati: II  Dental      Pulmonary    (-) sleep apnea  Cardiovascular   Exercise tolerance: excellent (>7 METS)    (-) pacemaker, past MI, cardiac stents, CABG      Neuro/Psych  (-) seizures, CVA  GI/Hepatic/Renal/Endo    (-) liver disease, no renal disease, diabetes    Musculoskeletal     Abdominal    Substance History      OB/GYN          Other                          Anesthesia Plan    ASA 2     general     intravenous induction     Anesthetic plan, risks, benefits, and alternatives have been provided, discussed and informed consent has been obtained with: patient.

## 2024-07-31 NOTE — OP NOTE
Patient Name: Gurpreet  : 1965  MRN: [id number]    DATE of SURGERY: 2024    SURGEON: Josef Miller MD    ASSISTANT: NONE    PREOPERATIVE DIAGNOSES:   1.  Left middle finger felon      POSTOPERATIVE DIAGNOSES:   1.  Left middle finger felon     PROCEDURE PERFORMED:   1.  Left middle finger incision and drainage      IMPLANTS  None.      ANESTHESIA    LMA with local anesthetic      OPERATIVE INDICATIONS    Mr. Delaney is a 58-year-old gentleman who presented to my clinic yesterday with progressively worsening left middle finger distal phalangeal pulp pain and swelling after he sustained a traumatic laceration approximately 1 week ago while cleaning a drain.  Initially stated that the finger was healing well but subsequently developed progressively worsening swelling and pain.  He attempted a course of oral antibiotics for short period of time but felt that symptoms were significantly worsening and he was subsequently referred to my office.  Clinical exam was consistent with a likely developing felon.  We discussed treatment options and he wished to proceed with operative intervention.  Risks including, but not limited to, bleeding, infection, wound healing problems, need for additional procedures, injury to surrounding structures with subsequent finger/hand dysfunction, stiffness, weakness and adhesions were all discussed.  All questions were answered preoperatively.  Written and informed consent were obtained.      ESTIMATED BLOOD LOSS    Less than 10 mL.      SPECIMENS  1.  Fluid specimen from left middle finger distal phalanx for microbiologic assessment      DRAINS  None.      COMPLICATIONS    None.      PROCEDURE IN DETAIL  The patient was seen in the preoperative holding room where the correct patient, procedure and side were confirmed.  The operative site was marked by myself with the patient's agreement.  The consent was signed by myself. The patient was transported to the operating  room, where a timeout was performed, identifying the correct patient, procedure and operative site.  Antibiotics were held until intraoperative cultures were obtained.  A nonsterile tourniquet was applied to the operative brachium.  The operative extremity was prepped and draped in a normal sterile fashion.    Skin marker was used to delineate an oblique incision overlying the left middle finger distal phalangeal pulp.  Left upper extremity was exsanguinated with an Esmarch and the tourniquet was inflated to 250 mmHg for less than 30 minutes.  Prior to initiation of the procedure, a left middle finger digital block was performed utilizing 8 cc of 1% lidocaine with epinephrine.  15 blade scalpel was then used to incise only through the skin.  Blunt dissection was carried into the subcutaneous tissues and clear serous fluid was encountered.  No foreign body was encountered.  No ilsa purulence was encountered.  Intraoperative fluid cultures were obtained for microbiologic assessment.  Blunt tipped scissor dissection was used to break up any loculations.  Wound was then irrigated with a combination of normal saline, Betadine and hydrogen peroxide.  Incision was packed open with quarter inch iodoform packing.  Soft dressing was applied to the left middle finger.  Tourniquet was deflated, general anesthesia was reversed and he was taken to PACU in stable condition.  All counts the end of procedure were correct.  He tolerated the procedure well and was without intraoperative complication.      POSTOPERATIVE PLAN    1.  Discharged home with family.     2.  Follow up in 1 week for a clinical check

## 2024-07-31 NOTE — ANESTHESIA PROCEDURE NOTES
Airway  Urgency: elective    Date/Time: 7/31/2024 11:44 AM  Airway not difficult    General Information and Staff    Patient location during procedure: OR  CRNA/CAA: Collin Edwards CRNA    Indications and Patient Condition    Preoxygenated: yes  Mask difficulty assessment: 1 - vent by mask    Final Airway Details  Final airway type: supraglottic airway      Successful airway: I-gel  Size 5     Number of attempts at approach: 1  Assessment: lips, teeth, and gum same as pre-op and atraumatic intubation

## 2024-08-03 LAB
BACTERIA SPEC AEROBE CULT: NORMAL
GRAM STN SPEC: NORMAL

## 2024-08-05 LAB — BACTERIA SPEC ANAEROBE CULT: NORMAL

## 2024-08-10 ENCOUNTER — APPOINTMENT (OUTPATIENT)
Dept: GENERAL RADIOLOGY | Facility: HOSPITAL | Age: 59
End: 2024-08-10
Payer: COMMERCIAL

## 2024-08-10 ENCOUNTER — HOSPITAL ENCOUNTER (EMERGENCY)
Facility: HOSPITAL | Age: 59
Discharge: HOME | End: 2024-08-10
Attending: EMERGENCY MEDICINE
Payer: COMMERCIAL

## 2024-08-10 VITALS
OXYGEN SATURATION: 94 % | TEMPERATURE: 98 F | SYSTOLIC BLOOD PRESSURE: 127 MMHG | DIASTOLIC BLOOD PRESSURE: 85 MMHG | HEIGHT: 68 IN | BODY MASS INDEX: 28.57 KG/M2 | WEIGHT: 188.5 LBS | RESPIRATION RATE: 18 BRPM | HEART RATE: 59 BPM

## 2024-08-10 DIAGNOSIS — L03.012 FELON OF FINGER OF LEFT HAND: Primary | ICD-10-CM

## 2024-08-10 LAB
ALBUMIN SERPL-MCNC: 4.2 G/DL (ref 3.5–5.2)
ALBUMIN/GLOB SERPL: 1.6 G/DL
ALP SERPL-CCNC: 78 U/L (ref 39–117)
ALT SERPL W P-5'-P-CCNC: 21 U/L (ref 1–41)
ANION GAP SERPL CALCULATED.3IONS-SCNC: 11 MMOL/L (ref 5–15)
AST SERPL-CCNC: 27 U/L (ref 1–40)
BASOPHILS # BLD AUTO: 0.07 10*3/MM3 (ref 0–0.2)
BASOPHILS NFR BLD AUTO: 0.9 % (ref 0–1.5)
BILIRUB SERPL-MCNC: 0.5 MG/DL (ref 0–1.2)
BUN SERPL-MCNC: 27 MG/DL (ref 6–20)
BUN/CREAT SERPL: 28.4 (ref 7–25)
CALCIUM SPEC-SCNC: 8.9 MG/DL (ref 8.6–10.5)
CHLORIDE SERPL-SCNC: 105 MMOL/L (ref 98–107)
CO2 SERPL-SCNC: 24 MMOL/L (ref 22–29)
CREAT SERPL-MCNC: 0.95 MG/DL (ref 0.76–1.27)
CRP SERPL-MCNC: <0.3 MG/DL (ref 0–0.5)
DEPRECATED RDW RBC AUTO: 38.4 FL (ref 37–54)
EGFRCR SERPLBLD CKD-EPI 2021: 92.2 ML/MIN/1.73
EOSINOPHIL # BLD AUTO: 0.53 10*3/MM3 (ref 0–0.4)
EOSINOPHIL NFR BLD AUTO: 6.5 % (ref 0.3–6.2)
ERYTHROCYTE [DISTWIDTH] IN BLOOD BY AUTOMATED COUNT: 12.5 % (ref 12.3–15.4)
ERYTHROCYTE [SEDIMENTATION RATE] IN BLOOD: 7 MM/HR (ref 0–20)
GLOBULIN UR ELPH-MCNC: 2.7 GM/DL
GLUCOSE SERPL-MCNC: 87 MG/DL (ref 65–99)
HCT VFR BLD AUTO: 43.9 % (ref 37.5–51)
HGB BLD-MCNC: 15.6 G/DL (ref 13–17.7)
IMM GRANULOCYTES # BLD AUTO: 0.03 10*3/MM3 (ref 0–0.05)
IMM GRANULOCYTES NFR BLD AUTO: 0.4 % (ref 0–0.5)
LYMPHOCYTES # BLD AUTO: 2.22 10*3/MM3 (ref 0.7–3.1)
LYMPHOCYTES NFR BLD AUTO: 27.4 % (ref 19.6–45.3)
MCH RBC QN AUTO: 30.3 PG (ref 26.6–33)
MCHC RBC AUTO-ENTMCNC: 35.5 G/DL (ref 31.5–35.7)
MCV RBC AUTO: 85.2 FL (ref 79–97)
MONOCYTES # BLD AUTO: 0.67 10*3/MM3 (ref 0.1–0.9)
MONOCYTES NFR BLD AUTO: 8.3 % (ref 5–12)
NEUTROPHILS NFR BLD AUTO: 4.58 10*3/MM3 (ref 1.7–7)
NEUTROPHILS NFR BLD AUTO: 56.5 % (ref 42.7–76)
NRBC BLD AUTO-RTO: 0 /100 WBC (ref 0–0.2)
PLATELET # BLD AUTO: 264 10*3/MM3 (ref 140–450)
PMV BLD AUTO: 9 FL (ref 6–12)
POTASSIUM SERPL-SCNC: 4.6 MMOL/L (ref 3.5–5.2)
PROT SERPL-MCNC: 6.9 G/DL (ref 6–8.5)
RBC # BLD AUTO: 5.15 10*6/MM3 (ref 4.14–5.8)
SODIUM SERPL-SCNC: 140 MMOL/L (ref 136–145)
WBC NRBC COR # BLD AUTO: 8.1 10*3/MM3 (ref 3.4–10.8)

## 2024-08-10 PROCEDURE — 73140 X-RAY EXAM OF FINGER(S): CPT

## 2024-08-10 PROCEDURE — 99283 EMERGENCY DEPT VISIT LOW MDM: CPT

## 2024-08-10 PROCEDURE — 86140 C-REACTIVE PROTEIN: CPT | Performed by: EMERGENCY MEDICINE

## 2024-08-10 PROCEDURE — 85025 COMPLETE CBC W/AUTO DIFF WBC: CPT | Performed by: EMERGENCY MEDICINE

## 2024-08-10 PROCEDURE — 80053 COMPREHEN METABOLIC PANEL: CPT | Performed by: EMERGENCY MEDICINE

## 2024-08-10 PROCEDURE — 36415 COLL VENOUS BLD VENIPUNCTURE: CPT

## 2024-08-10 PROCEDURE — 85652 RBC SED RATE AUTOMATED: CPT | Performed by: EMERGENCY MEDICINE

## 2024-08-10 RX ORDER — CEPHALEXIN 500 MG/1
500 CAPSULE ORAL 4 TIMES DAILY
Qty: 28 CAPSULE | Refills: 0 | Status: SHIPPED | OUTPATIENT
Start: 2024-08-10 | End: 2024-08-17

## 2024-08-10 RX ORDER — OXYCODONE HYDROCHLORIDE AND ACETAMINOPHEN 5; 325 MG/1; MG/1
1 TABLET ORAL EVERY 6 HOURS PRN
Qty: 10 TABLET | Refills: 0 | Status: SHIPPED | OUTPATIENT
Start: 2024-08-10 | End: 2024-08-10

## 2024-08-10 RX ORDER — CEPHALEXIN 500 MG/1
500 CAPSULE ORAL 4 TIMES DAILY
Qty: 28 CAPSULE | Refills: 0 | Status: SHIPPED | OUTPATIENT
Start: 2024-08-10 | End: 2024-08-10

## 2024-08-10 RX ORDER — OXYCODONE HYDROCHLORIDE AND ACETAMINOPHEN 5; 325 MG/1; MG/1
1 TABLET ORAL EVERY 6 HOURS PRN
Qty: 10 TABLET | Refills: 0 | Status: ON HOLD | OUTPATIENT
Start: 2024-08-10 | End: 2024-08-14

## 2024-08-10 NOTE — ED PROVIDER NOTES
"EMERGENCY DEPARTMENT ATTENDING NOTE    Patient Name: Selvin Garcia    Chief Complaint   Patient presents with    Post-op Problem       PATIENT PRESENTATION:  Selvin Garcia is a 58 y.o. male with PMH significant for hyperlipidemia who presents to the ED with left distal middle finger pain after 31 July I&D of his felon on the same finger.  Had follow-up with Dr. Miller this past week and it was improving at the time.  Has had worsening throbbing pain that started this morning.  Just finished his clindamycin today.  Denies any fevers or systemic symptoms.  Feels exactly like before he had his I&D.  Originally when he developed symptoms his PCM put him on clindamycin and waited a couple days and when he saw orthopedic they recommended I&D.  Has not been drained today.      PHYSICAL EXAM:   VS: /85   Pulse 59   Temp 98 °F (36.7 °C)   Resp 18   Ht 172.7 cm (68\")   Wt 85.5 kg (188 lb 8 oz)   SpO2 94%   BMI 28.66 kg/m²   GENERAL: well-nourished, well-developed, awake, alert, no acute distress, nontoxic appearing, comfortable  EYES: PERRL, sclerae anicteric, extraocular movements grossly intact, symmetric lids  EARS, NOSE, MOUTH, THROAT: atraumatic external nose and ears, moist mucous membranes  NECK: symmetric, trachea midline  RESPIRATORY: unlabored respiratory effort, clear to auscultation bilaterally, good air movement  CARDIOVASCULAR: no murmurs, peripheral pulses 2+ and equal in all extremities  GI: soft, nontender, nondistended  MUSCULOSKELETAL/EXTREMITIES: Erythema, scabbing, and swelling of the DIP of the left third digit on the flexor surface.  No pain with flexion or passive extension.  No tenderness past the DIP.  No other extremity abnormalities  SKIN: warm and dry with no obvious rashes  NEUROLOGIC: moving all 4 extremities symmetrically, CN II-XII grossly intact  PSYCHIATRIC: alert, pleasant and cooperative. Appropriate mood and affect.      MEDICAL DECISION MAKING:    Selvin Garcia " is a 58 y.o. male who presented to the ED with worsening left middle finger distal tenderness, swelling, pain    Procedures    Differential Diagnosis Considered: Felon, flexor tenosynovitis, cellulitis    Labs Ordered:  Labs Reviewed   COMPREHENSIVE METABOLIC PANEL - Abnormal; Notable for the following components:       Result Value    BUN 27 (*)     BUN/Creatinine Ratio 28.4 (*)     All other components within normal limits    Narrative:     GFR Normal >60  Chronic Kidney Disease <60  Kidney Failure <15     CBC WITH AUTO DIFFERENTIAL - Abnormal; Notable for the following components:    Eosinophil % 6.5 (*)     Eosinophils, Absolute 0.53 (*)     All other components within normal limits   SEDIMENTATION RATE - Normal   C-REACTIVE PROTEIN - Normal   CBC AND DIFFERENTIAL    Narrative:     The following orders were created for panel order CBC & Differential.  Procedure                               Abnormality         Status                     ---------                               -----------         ------                     CBC Auto Differential[[id number]]        Abnormal            Final result                 Please view results for these tests on the individual orders.        Imaging Ordered:   XR Finger 2+ View Left   Final Result   1. Soft tissue injury overlying the third digit distal phalanx. No   underlying bony abnormality. No radiodense foreign bodies.               This report was signed and finalized on 8/10/2024 3:24 PM by Dr Jose Raul Rader.              Internal chart review:   Past Medical History:   Diagnosis Date    Abnormal CT of paranasal sinuses 08/20/2015    (Note 1 of 3)  NASAL SEPTUM:  The nasal septum is relatively midline.  MAXILLARY SINUSES:  The left maxillary sinus showed >5 m mucosal thickening and 50% opacification and the right maxillary sinus showed >5 mm mucosal thickening. The osteomeatal complex is obstructed bilaterally.    Abnormal CT of paranasal sinuses 08/20/2015    (Note 2  of 3)  NASAL TURBINATES: The inferior turbinates showed bilateral hypertrophy. The Cherie bullosa are not present.  ETHMOID SINUSES: The left ethmoid sinus showed >3 mm mucosal thickening and complete opacification. The lamina papyracea appears intact bilaterally.    Abnormal CT of paranasal sinuses 08/20/2015    (Note 3 of 3)  FRONTAL SINUSES:  The left frontal sinus showed complete opacification. The right frontal sinus showed complete opacification. SPENOID SINUSES: The left sphenoid sinus showed moderate mucosal thickening. The right sphenoid sinus showed moderate mucosal thickening.    Allergic rhinitis 12/01/2015    Allergic rhinitis     Asthma     Chronic rhinitis     Chronic sinusitis     Disturbances of sensation of smell and taste 12/01/2015    Diverticulosis     Elevated cholesterol     Elevated fasting glucose     History of adenomatous polyp of colon     History of colon polyps     Sensorineural hearing loss, bilateral 12/01/2015    Subjective tinnitus 12/01/2015    Wound infection 07/27/2024    Was working on pipe & cut Left middle finger       Past Surgical History:   Procedure Laterality Date    ARM DEBRIDEMENT Left 7/31/2024    Procedure: LEFT MIDDLE FINGER INCISION AND DEBRIDEMENT;  Surgeon: Josef Miller MD;  Location: [facility] OR;  Service: Orthopedics;  Laterality: Left;    COLONOSCOPY  12/09/2013    Dr. Chavez-Polyp; Repeat 5 years (Abstracted from PCP's note)    COLONOSCOPY N/A 9/24/2021    One 6mm tubular adenomatous polyp in the ascending colon-Resected and retrieved-Clip (MR conditional) was placed; Diverticulosis in the left colon; Non-bleeding internal hemorrhoids; Repeat 5 years     ENDOSCOPY  08/14/2015    Dr. Mullins-Gastric ulcer; Repeat 8 weeks (Abstracted from PCP's note)    ENDOSCOPY  10/16/2015    Dr. Mullins-Dr. Mullins (Abstracted from PCP's note)    ENDOSCOPY N/A 10/15/2021    Procedure: ESOPHAGOGASTRODUODENOSCOPY WITH ANESTHESIA;  Surgeon: Isaura Fragoso MD;  Location: [facility]  ENDOSCOPY;  Service: Gastroenterology;  Laterality: N/A;  pre screen  post balloon  Dr. Pedersen    FRACTURE SURGERY      FUNCTIONAL ENDOSCOPIC SINUS SURGERY  07/26/2016    HEMORRHOIDECTOMY      KNEE ARTHROPLASTY, PARTIAL REPLACEMENT Left     KNEE MENISCECTOMY Right     X 2    OTHER SURGICAL HISTORY      Open Reduction-Internal Fixation    ROTATOR CUFF REPAIR Right     SEPTOPLASTY      Per Dr. Urrutia 7-8 years ago       No Known Allergies    No current facility-administered medications for this encounter.    Current Outpatient Medications:     cephalexin (KEFLEX) 500 MG capsule, Take 1 capsule by mouth 4 (Four) Times a Day for 7 days., Disp: 28 capsule, Rfl: 0    oxyCODONE-acetaminophen (PERCOCET) 5-325 MG per tablet, Take 1 tablet by mouth Every 6 (Six) Hours As Needed for Severe Pain for up to 10 doses., Disp: 10 tablet, Rfl: 0    albuterol sulfate  (90 Base) MCG/ACT inhaler, Inhale 2 puffs Every 4 (Four) Hours As Needed for Wheezing or Shortness of Air., Disp: 8 g, Rfl: 0    cetirizine (zyrTEC) 10 MG tablet, TAKE 1 TABLET DAILY (Patient taking differently: Take 1 tablet by mouth Daily As Needed for Allergies or Rhinitis.), Disp: 90 tablet, Rfl: 3    desvenlafaxine (Pristiq) 50 MG 24 hr tablet, Take 1 tablet by mouth Daily., Disp: 30 tablet, Rfl: 5    Diclofenac Sodium (VOLTAREN) 1 % gel gel, Apply 4 g topically to the appropriate area as directed 4 (Four) Times a Day As Needed (sore joint)., Disp: , Rfl:     fluticasone (FLONASE) 50 MCG/ACT nasal spray, 2 sprays into the nostril(s) as directed by provider Daily for 90 days. (Patient taking differently: 2 sprays into the nostril(s) as directed by provider Daily As Needed for Rhinitis or Allergies.), Disp: 48 g, Rfl: 3    methylphenidate (Concerta) 36 MG CR tablet, Take 1 tablet by mouth Every Morning, Disp: 30 tablet, Rfl: 0    montelukast (SINGULAIR) 10 MG tablet, TAKE 1 TABLET EVERY NIGHT (Patient taking differently: Take 1 tablet by mouth At Night As  Needed (allergic rhinitis).), Disp: 90 tablet, Rfl: 3    naloxone (NARCAN) 4 MG/0.1ML nasal spray, Call 911. Don't prime. Morrill in 1 nostril for overdose. Repeat in 2-3 minutes in other nostril if no or minimal breathing/responsiveness., Disp: 2 each, Rfl: 0    rosuvastatin (Crestor) 20 MG tablet, Take 1 tablet by mouth Every Night., Disp: 30 tablet, Rfl: 5    External documents reviewed: Reviewed with Dr. Miller documentation with left middle finger distal phalanx pulp feline after traumatic laceration and no signs of pyogenic flexor tendon cellulitis I&D and antibiotics were completed.    My lab interpretation: Negative inflammatory markers    My imaging interpretation: No signs of osteomyelitis or foreign body    Discussed with: Dr. Guzmán    ED Course as of 08/10/24 1625   Sat Aug 10, 2024   1532 X-ray with soft tissue swelling and no bony abnormality or foreign bodies.  No leukocytosis. [JJ]   1553 Normal inflammatory markers, no white count.  Discussed with patient and will discuss with orthopedics of whether we will attempt a I&D in the emergency department, orthopedics will come into the emergency department for ID, or if they like him to follow-up on Monday for incision and drainage and start new antibiotic given worsening infection and swelling. [JJ]   1600 Discussed with Dr. Byron funez who recommended follow-up on Monday for I&D with Dr. Miller recommended switching antibiotics to Keflex given that it got worse on clindamycin.  Will redosed pain meds as well. [JJ]      ED Course User Index  [JJ] Orion Rodríguez MD       ED Diagnosis:  (L03.012) Felon of finger of left hand - Plan: oxyCODONE-acetaminophen (PERCOCET) 5-325 MG per tablet     Disposition: to home  Follow up plan: Orthopedics follow up within 2 days, return to ED immediately if symptoms worsen        Signed:  Orion Rodríguez MD  Emergency Medicine Physician    Please note that portions of this note were completed with a voice recognition program.       Orion Rodríguez MD  08/10/24 [id number]       Orion Rodríguez MD  08/10/24 [id number]

## 2024-08-14 ENCOUNTER — ANESTHESIA EVENT (OUTPATIENT)
Dept: PERIOP | Facility: HOSPITAL | Age: 59
End: 2024-08-14
Payer: COMMERCIAL

## 2024-08-14 ENCOUNTER — ANESTHESIA (OUTPATIENT)
Dept: PERIOP | Facility: HOSPITAL | Age: 59
End: 2024-08-14
Payer: COMMERCIAL

## 2024-08-14 ENCOUNTER — HOSPITAL ENCOUNTER (OUTPATIENT)
Facility: HOSPITAL | Age: 59
Setting detail: HOSPITAL OUTPATIENT SURGERY
Discharge: HOME | End: 2024-08-14
Attending: ORTHOPAEDIC SURGERY | Admitting: ORTHOPAEDIC SURGERY
Payer: COMMERCIAL

## 2024-08-14 VITALS
OXYGEN SATURATION: 96 % | DIASTOLIC BLOOD PRESSURE: 71 MMHG | TEMPERATURE: 97.3 F | WEIGHT: 187.83 LBS | BODY MASS INDEX: 27.82 KG/M2 | HEART RATE: 69 BPM | SYSTOLIC BLOOD PRESSURE: 130 MMHG | RESPIRATION RATE: 16 BRPM | HEIGHT: 69 IN

## 2024-08-14 DIAGNOSIS — L03.012 FELON OF FINGER OF LEFT HAND: ICD-10-CM

## 2024-08-14 DIAGNOSIS — L08.9: ICD-10-CM

## 2024-08-14 DIAGNOSIS — S60.418A: ICD-10-CM

## 2024-08-14 PROCEDURE — 87205 SMEAR GRAM STAIN: CPT | Performed by: ORTHOPAEDIC SURGERY

## 2024-08-14 PROCEDURE — 87077 CULTURE AEROBIC IDENTIFY: CPT | Performed by: ORTHOPAEDIC SURGERY

## 2024-08-14 PROCEDURE — 87186 SC STD MICRODIL/AGAR DIL: CPT | Performed by: ORTHOPAEDIC SURGERY

## 2024-08-14 PROCEDURE — 87070 CULTURE OTHR SPECIMN AEROBIC: CPT | Performed by: ORTHOPAEDIC SURGERY

## 2024-08-14 PROCEDURE — 87015 SPECIMEN INFECT AGNT CONCNTJ: CPT | Performed by: ORTHOPAEDIC SURGERY

## 2024-08-14 PROCEDURE — 87075 CULTR BACTERIA EXCEPT BLOOD: CPT | Performed by: ORTHOPAEDIC SURGERY

## 2024-08-14 PROCEDURE — 25010000002 GLYCOPYRROLATE 0.4 MG/2ML SOLUTION

## 2024-08-14 PROCEDURE — 25010000002 DEXAMETHASONE PER 1 MG

## 2024-08-14 PROCEDURE — 25010000002 CEFAZOLIN PER 500 MG

## 2024-08-14 PROCEDURE — 25810000003 LACTATED RINGERS PER 1000 ML: Performed by: ORTHOPAEDIC SURGERY

## 2024-08-14 PROCEDURE — 25010000002 FENTANYL CITRATE (PF) 100 MCG/2ML SOLUTION

## 2024-08-14 PROCEDURE — 25010000002 ONDANSETRON PER 1 MG

## 2024-08-14 PROCEDURE — 25010000002 PROPOFOL 10 MG/ML EMULSION

## 2024-08-14 RX ORDER — LIDOCAINE HYDROCHLORIDE AND EPINEPHRINE 10; 10 MG/ML; UG/ML
INJECTION, SOLUTION INFILTRATION; PERINEURAL AS NEEDED
Status: DISCONTINUED | OUTPATIENT
Start: 2024-08-14 | End: 2024-08-14 | Stop reason: HOSPADM

## 2024-08-14 RX ORDER — FENTANYL CITRATE 50 UG/ML
50 INJECTION, SOLUTION INTRAMUSCULAR; INTRAVENOUS
Status: DISCONTINUED | OUTPATIENT
Start: 2024-08-14 | End: 2024-08-14 | Stop reason: HOSPADM

## 2024-08-14 RX ORDER — FLUMAZENIL 0.1 MG/ML
0.2 INJECTION INTRAVENOUS AS NEEDED
Status: DISCONTINUED | OUTPATIENT
Start: 2024-08-14 | End: 2024-08-14 | Stop reason: HOSPADM

## 2024-08-14 RX ORDER — CEFAZOLIN SODIUM 1 G/3ML
INJECTION, POWDER, FOR SOLUTION INTRAMUSCULAR; INTRAVENOUS AS NEEDED
Status: DISCONTINUED | OUTPATIENT
Start: 2024-08-14 | End: 2024-08-14 | Stop reason: SURG

## 2024-08-14 RX ORDER — IBUPROFEN 600 MG/1
600 TABLET ORAL EVERY 6 HOURS PRN
Status: DISCONTINUED | OUTPATIENT
Start: 2024-08-14 | End: 2024-08-14 | Stop reason: HOSPADM

## 2024-08-14 RX ORDER — LIDOCAINE HYDROCHLORIDE 10 MG/ML
0.5 INJECTION, SOLUTION EPIDURAL; INFILTRATION; INTRACAUDAL; PERINEURAL ONCE AS NEEDED
Status: DISCONTINUED | OUTPATIENT
Start: 2024-08-14 | End: 2024-08-14 | Stop reason: HOSPADM

## 2024-08-14 RX ORDER — NALOXONE HCL 0.4 MG/ML
0.4 VIAL (ML) INJECTION AS NEEDED
Status: DISCONTINUED | OUTPATIENT
Start: 2024-08-14 | End: 2024-08-14 | Stop reason: HOSPADM

## 2024-08-14 RX ORDER — HYDROCODONE BITARTRATE AND ACETAMINOPHEN 10; 325 MG/1; MG/1
1 TABLET ORAL EVERY 4 HOURS PRN
Status: DISCONTINUED | OUTPATIENT
Start: 2024-08-14 | End: 2024-08-14 | Stop reason: HOSPADM

## 2024-08-14 RX ORDER — DEXAMETHASONE SODIUM PHOSPHATE 4 MG/ML
INJECTION, SOLUTION INTRA-ARTICULAR; INTRALESIONAL; INTRAMUSCULAR; INTRAVENOUS; SOFT TISSUE AS NEEDED
Status: DISCONTINUED | OUTPATIENT
Start: 2024-08-14 | End: 2024-08-14 | Stop reason: SURG

## 2024-08-14 RX ORDER — DEXTROSE MONOHYDRATE 25 G/50ML
12.5 INJECTION, SOLUTION INTRAVENOUS AS NEEDED
Status: DISCONTINUED | OUTPATIENT
Start: 2024-08-14 | End: 2024-08-14 | Stop reason: HOSPADM

## 2024-08-14 RX ORDER — GLYCOPYRROLATE 0.2 MG/ML
INJECTION INTRAMUSCULAR; INTRAVENOUS AS NEEDED
Status: DISCONTINUED | OUTPATIENT
Start: 2024-08-14 | End: 2024-08-14 | Stop reason: SURG

## 2024-08-14 RX ORDER — SODIUM CHLORIDE, SODIUM LACTATE, POTASSIUM CHLORIDE, CALCIUM CHLORIDE 600; 310; 30; 20 MG/100ML; MG/100ML; MG/100ML; MG/100ML
1000 INJECTION, SOLUTION INTRAVENOUS CONTINUOUS
Status: DISCONTINUED | OUTPATIENT
Start: 2024-08-14 | End: 2024-08-14 | Stop reason: HOSPADM

## 2024-08-14 RX ORDER — MIDAZOLAM HYDROCHLORIDE 1 MG/ML
1 INJECTION INTRAMUSCULAR; INTRAVENOUS
Status: DISCONTINUED | OUTPATIENT
Start: 2024-08-14 | End: 2024-08-14 | Stop reason: HOSPADM

## 2024-08-14 RX ORDER — DROPERIDOL 2.5 MG/ML
0.62 INJECTION, SOLUTION INTRAMUSCULAR; INTRAVENOUS ONCE AS NEEDED
Status: DISCONTINUED | OUTPATIENT
Start: 2024-08-14 | End: 2024-08-14 | Stop reason: HOSPADM

## 2024-08-14 RX ORDER — SODIUM CHLORIDE 0.9 % (FLUSH) 0.9 %
10 SYRINGE (ML) INJECTION EVERY 12 HOURS SCHEDULED
Status: DISCONTINUED | OUTPATIENT
Start: 2024-08-14 | End: 2024-08-14 | Stop reason: HOSPADM

## 2024-08-14 RX ORDER — ONDANSETRON 2 MG/ML
4 INJECTION INTRAMUSCULAR; INTRAVENOUS ONCE AS NEEDED
Status: DISCONTINUED | OUTPATIENT
Start: 2024-08-14 | End: 2024-08-14 | Stop reason: HOSPADM

## 2024-08-14 RX ORDER — LABETALOL HYDROCHLORIDE 5 MG/ML
5 INJECTION, SOLUTION INTRAVENOUS
Status: DISCONTINUED | OUTPATIENT
Start: 2024-08-14 | End: 2024-08-14 | Stop reason: HOSPADM

## 2024-08-14 RX ORDER — FENTANYL CITRATE 50 UG/ML
25 INJECTION, SOLUTION INTRAMUSCULAR; INTRAVENOUS
Status: DISCONTINUED | OUTPATIENT
Start: 2024-08-14 | End: 2024-08-14 | Stop reason: HOSPADM

## 2024-08-14 RX ORDER — SODIUM CHLORIDE 0.9 % (FLUSH) 0.9 %
10 SYRINGE (ML) INJECTION AS NEEDED
Status: DISCONTINUED | OUTPATIENT
Start: 2024-08-14 | End: 2024-08-14 | Stop reason: HOSPADM

## 2024-08-14 RX ORDER — HYDROCODONE BITARTRATE AND ACETAMINOPHEN 5; 325 MG/1; MG/1
1 TABLET ORAL EVERY 4 HOURS PRN
Status: DISCONTINUED | OUTPATIENT
Start: 2024-08-14 | End: 2024-08-14 | Stop reason: HOSPADM

## 2024-08-14 RX ORDER — ONDANSETRON 2 MG/ML
INJECTION INTRAMUSCULAR; INTRAVENOUS AS NEEDED
Status: DISCONTINUED | OUTPATIENT
Start: 2024-08-14 | End: 2024-08-14 | Stop reason: SURG

## 2024-08-14 RX ORDER — PHENYLEPHRINE HCL IN 0.9% NACL 1 MG/10 ML
SYRINGE (ML) INTRAVENOUS AS NEEDED
Status: DISCONTINUED | OUTPATIENT
Start: 2024-08-14 | End: 2024-08-14 | Stop reason: SURG

## 2024-08-14 RX ORDER — PROPOFOL 10 MG/ML
VIAL (ML) INTRAVENOUS AS NEEDED
Status: DISCONTINUED | OUTPATIENT
Start: 2024-08-14 | End: 2024-08-14 | Stop reason: SURG

## 2024-08-14 RX ORDER — OXYCODONE HYDROCHLORIDE AND ACETAMINOPHEN 5; 325 MG/1; MG/1
1 TABLET ORAL EVERY 6 HOURS PRN
Qty: 10 TABLET | Refills: 0 | Status: SHIPPED | OUTPATIENT
Start: 2024-08-14

## 2024-08-14 RX ORDER — LIDOCAINE HYDROCHLORIDE 20 MG/ML
INJECTION, SOLUTION EPIDURAL; INFILTRATION; INTRACAUDAL; PERINEURAL AS NEEDED
Status: DISCONTINUED | OUTPATIENT
Start: 2024-08-14 | End: 2024-08-14 | Stop reason: SURG

## 2024-08-14 RX ORDER — FENTANYL CITRATE 50 UG/ML
INJECTION, SOLUTION INTRAMUSCULAR; INTRAVENOUS AS NEEDED
Status: DISCONTINUED | OUTPATIENT
Start: 2024-08-14 | End: 2024-08-14 | Stop reason: SURG

## 2024-08-14 RX ORDER — SODIUM CHLORIDE 0.9 % (FLUSH) 0.9 %
3 SYRINGE (ML) INJECTION AS NEEDED
Status: DISCONTINUED | OUTPATIENT
Start: 2024-08-14 | End: 2024-08-14 | Stop reason: HOSPADM

## 2024-08-14 RX ORDER — SODIUM CHLORIDE, SODIUM LACTATE, POTASSIUM CHLORIDE, CALCIUM CHLORIDE 600; 310; 30; 20 MG/100ML; MG/100ML; MG/100ML; MG/100ML
9 INJECTION, SOLUTION INTRAVENOUS CONTINUOUS
Status: DISCONTINUED | OUTPATIENT
Start: 2024-08-14 | End: 2024-08-14 | Stop reason: HOSPADM

## 2024-08-14 RX ORDER — EPHEDRINE SULFATE 50 MG/ML
INJECTION, SOLUTION INTRAVENOUS AS NEEDED
Status: DISCONTINUED | OUTPATIENT
Start: 2024-08-14 | End: 2024-08-14 | Stop reason: SURG

## 2024-08-14 RX ORDER — MAGNESIUM HYDROXIDE 1200 MG/15ML
LIQUID ORAL AS NEEDED
Status: DISCONTINUED | OUTPATIENT
Start: 2024-08-14 | End: 2024-08-14 | Stop reason: HOSPADM

## 2024-08-14 RX ADMIN — SODIUM CHLORIDE, POTASSIUM CHLORIDE, SODIUM LACTATE AND CALCIUM CHLORIDE 1000 ML: 600; 310; 30; 20 INJECTION, SOLUTION INTRAVENOUS at 13:14

## 2024-08-14 RX ADMIN — DEXAMETHASONE SODIUM PHOSPHATE 4 MG: 4 INJECTION INTRA-ARTICULAR; INTRALESIONAL; INTRAMUSCULAR; INTRAVENOUS; SOFT TISSUE at 14:11

## 2024-08-14 RX ADMIN — FENTANYL CITRATE 50 MCG: 50 INJECTION, SOLUTION INTRAMUSCULAR; INTRAVENOUS at 14:09

## 2024-08-14 RX ADMIN — Medication 100 MCG: at 14:09

## 2024-08-14 RX ADMIN — EPHEDRINE SULFATE 10 MG: 50 INJECTION INTRAVENOUS at 14:09

## 2024-08-14 RX ADMIN — LIDOCAINE HYDROCHLORIDE 100 MG: 20 INJECTION, SOLUTION EPIDURAL; INFILTRATION; INTRACAUDAL; PERINEURAL at 13:51

## 2024-08-14 RX ADMIN — HYDROCODONE BITARTRATE AND ACETAMINOPHEN 1 TABLET: 10; 325 TABLET ORAL at 14:54

## 2024-08-14 RX ADMIN — PROPOFOL 200 MG: 10 INJECTION, EMULSION INTRAVENOUS at 13:51

## 2024-08-14 RX ADMIN — FENTANYL CITRATE 50 MCG: 50 INJECTION, SOLUTION INTRAMUSCULAR; INTRAVENOUS at 13:58

## 2024-08-14 RX ADMIN — EPHEDRINE SULFATE 15 MG: 50 INJECTION INTRAVENOUS at 14:05

## 2024-08-14 RX ADMIN — ONDANSETRON 4 MG: 2 INJECTION INTRAMUSCULAR; INTRAVENOUS at 14:11

## 2024-08-14 RX ADMIN — GLYCOPYRROLATE 0.2 MG: 0.2 INJECTION INTRAMUSCULAR; INTRAVENOUS at 14:13

## 2024-08-14 RX ADMIN — CEFAZOLIN 2 G: 330 INJECTION, POWDER, FOR SOLUTION INTRAMUSCULAR; INTRAVENOUS at 14:03

## 2024-08-14 NOTE — ANESTHESIA PROCEDURE NOTES
Airway  Date/Time: 8/14/2024 1:54 PM    General Information and Staff    Patient location during procedure: OR  CRNA/CAA: Rachel Guzman CRNA    Indications and Patient Condition  Indications for airway management: airway protection    Preoxygenated: yes  Mask difficulty assessment: 0 - not attempted    Final Airway Details  Final airway type: supraglottic airway      Successful airway: I-gel  Size 5     Number of attempts at approach: 1  Assessment: lips, teeth, and gum same as pre-op and atraumatic intubation

## 2024-08-14 NOTE — ANESTHESIA POSTPROCEDURE EVALUATION
Patient: Selvin Garcia    Procedure Summary       Date: 08/14/24 Room / Location:  [facility] OR  /  [facility] OR    Anesthesia Start: 1349 Anesthesia Stop: 1433    Procedure: LEFT MIDDLE FINGER INCISION AND DEBRIDEMENT (Left: Arm Upper) Diagnosis: (L03.012)    Surgeons: Josef Miller MD Provider: Rachel Guzman CRNA    Anesthesia Type: general ASA Status: 2            Anesthesia Type: general    Vitals  Vitals Value Taken Time   /76 08/14/24 1546   Temp 97.2 °F (36.2 °C) 08/14/24 1427   Pulse 59 08/14/24 1548   Resp 16 08/14/24 1534   SpO2 96 % 08/14/24 1548   Vitals shown include unfiled device data.        Post Anesthesia Care and Evaluation    Patient location during evaluation: bedside  Patient participation: complete - patient participated  Level of consciousness: awake and alert  Pain management: adequate    Airway patency: patent  Anesthetic complications: No anesthetic complications  PONV Status: none  Cardiovascular status: acceptable  Respiratory status: acceptable  Hydration status: acceptable    Comments: Pt discharged from PACU based on de score >8       No anesthesia care post op

## 2024-08-14 NOTE — OP NOTE
Patient Name: Gurpreet  : 1965  MRN: [id number]    DATE of SURGERY: 2024    SURGEON: Josef Miller MD    ASSISTANT: NONE    PREOPERATIVE DIAGNOSES:   1.  Left middle finger felon      POSTOPERATIVE DIAGNOSES:   1.  Left middle finger felon     PROCEDURE PERFORMED:   1.  Left middle finger repeat incision and drainage      IMPLANTS  None.      ANESTHESIA    LMA with local anesthetic      OPERATIVE INDICATIONS    Mr. Delaney is a 58-year-old gentleman whom I have performed a prior left middle finger incision and drainage on 2024 for symptoms consistent with a felon.  Intraoperative cultures returned no growth to date.  He was seen postoperatively and doing well with plans to return to work this week.  Unfortunately, on Saturday, he had progressively worsening pressure and throbbing pain.  He was evaluated in the clinical setting on Tuesday.  His finger appeared relatively benign but due to his symptomatic progression, we elected to proceed with a repeat incision and drainage.  Risks including, but not limited to, bleeding, infection, wound healing problems, need for additional procedures, injury to surrounding structures with subsequent finger/hand dysfunction, stiffness, weakness and adhesions were all discussed.  All questions were answered preoperatively.  Written and informed consent were obtained.       ESTIMATED BLOOD LOSS    Less than 10 mL.      SPECIMENS  1.  Fluid specimen from left middle finger distal phalanx for microbiologic assessment      DRAINS  None.      COMPLICATIONS    None.      PROCEDURE IN DETAIL  The patient was seen in the preoperative holding room where the correct patient, procedure and side were confirmed.  The operative site was marked by myself with the patient's agreement.  The consent was signed by myself. The patient was transported to the operating room, where a timeout was performed, identifying the correct patient, procedure and operative site.   Antibiotics were administered within 1 hour of incision, he had already been started on oral Keflex.  A nonsterile tourniquet was applied to the operative brachium.  The operative extremity was prepped and draped in a normal sterile fashion.     Left upper extremity was exsanguinated with an Esmarch and the tourniquet was inflated to 250 mmHg for less than 30 minutes.  Prior to initiation of the procedure, a left middle finger digital block was performed utilizing 8 cc of 1% lidocaine with epinephrine.  Blunt dissection was carried through the prior oblique surgical incision and dry eschar was removed.  No gross purulence or foreign body was encountered.  Fluid specimen was obtained for microbiologic assessment.  I extended the oblique incision proximally and distally for a total of 5 mm.  Blunt dissection was performed to break up any loculations.  Dissection was carried down to the FDP tendon and tendon sheath which was intact.  At this point, excisional debridement and irrigation of the skin and subcutaneous tissue were performed utilizing Adson pickups and scissors.  During excisional debridement, irrigation with normal saline, Betadine and hydrogen peroxide were performed.  After thorough irrigation and excisional debridement, the wound was packed with quarter inch iodoform packing and loosely reapproximated with a single nylon suture.  Soft dressings applied to the left middle finger.  Tourniquet was deflated, general anesthesia was reversed and he was taken to PACU in stable condition.  All counts at the end of procedure were correct.  He tolerated the procedure well and was without intraoperative complication.      POSTOPERATIVE PLAN    1.  Discharged home with family.     2.  Follow up in 1 week for a clinical check

## 2024-08-14 NOTE — DISCHARGE INSTRUCTIONS
1.  Elevate operative upper extremity.  Encourage gentle finger range of motion.  2.  No lifting greater than 5-10 pounds with the operative hand until follow-up.  3.  Remove dressing on postoperative day 1 and begin daily soaks  and peroxide for approximately 2-3 minutes.  Remove a portion, approximately 1 cm, of packing and cut close to the skin.  Reapply clean, dry dressing.  4.  Pain medication as prescribed.  No additional Tylenol, alcohol or driving while on opioid pain medication.  Wean off pain medication as able.  5.  Return to clinic in 1 week for wound check and likely suture removal.  6.  Call our clinic number in the interim with any questions or concerns.

## 2024-08-17 LAB
BACTERIA SPEC AEROBE CULT: ABNORMAL
BACTERIA SPEC AEROBE CULT: ABNORMAL
GRAM STN SPEC: ABNORMAL
GRAM STN SPEC: ABNORMAL

## 2024-08-19 LAB — BACTERIA SPEC ANAEROBE CULT: NORMAL

## 2024-08-21 DIAGNOSIS — R41.840 POOR CONCENTRATION: ICD-10-CM

## 2024-08-21 RX ORDER — METHYLPHENIDATE HYDROCHLORIDE 36 MG/1
36 TABLET ORAL EVERY MORNING
Qty: 30 TABLET | Refills: 0 | Status: SHIPPED | OUTPATIENT
Start: 2024-08-21

## 2024-08-21 NOTE — TELEPHONE ENCOUNTER
Rx Refill Note  Requested Prescriptions     Pending Prescriptions Disp Refills    methylphenidate (Concerta) 36 MG CR tablet 30 tablet 0     Sig: Take 1 tablet by mouth Every Morning      Last office visit with office: 07/29/24  Next office visit with office: none    UDS: none    DATE OF LAST REFILL: 07/17/24    Controlled Substance Agreement: not up to date    ALECIA OR ANGELINEP: 08/21/24         {TIP  Is Refill Pharmacy correct?:  Melodie Hahn MA  08/21/24, 11:26 CDT

## 2024-08-29 ENCOUNTER — TELEPHONE (OUTPATIENT)
Dept: FAMILY MEDICINE CLINIC | Facility: CLINIC | Age: 59
End: 2024-08-29

## 2024-09-16 ENCOUNTER — TELEPHONE (OUTPATIENT)
Dept: FAMILY MEDICINE CLINIC | Facility: CLINIC | Age: 59
End: 2024-09-16

## 2024-09-16 NOTE — TELEPHONE ENCOUNTER
Caller: Selvin Garcia    Relationship: Self    Best call back number: [phone]     What form or medical record are you requesting: VISIT NOTES FROM 07/29/24    Who is requesting this form or medical record from you: PATIENT    How would you like to receive the form or medical records (pick-up, mail, fax):     NEEDS TO BE FAXED TO [facility]    Timeframe paperwork needed: AS SOON AS POSSIBLE    Additional notes: PLEASE CALL

## 2024-09-17 NOTE — TELEPHONE ENCOUNTER
If pt calls back please tell him we need a fax number to send the dos 07/29/24.  I called the number he gave me but the hold time is too long for me to wait.

## 2024-09-19 DIAGNOSIS — R41.840 POOR CONCENTRATION: ICD-10-CM

## 2024-09-19 RX ORDER — METHYLPHENIDATE HYDROCHLORIDE 36 MG/1
36 TABLET ORAL EVERY MORNING
Qty: 30 TABLET | Refills: 0 | Status: SHIPPED | OUTPATIENT
Start: 2024-09-19

## 2024-10-11 DIAGNOSIS — R41.840 POOR CONCENTRATION: ICD-10-CM

## 2024-10-14 RX ORDER — METHYLPHENIDATE HYDROCHLORIDE 36 MG/1
36 TABLET ORAL EVERY MORNING
Qty: 30 TABLET | Refills: 0 | Status: SHIPPED | OUTPATIENT
Start: 2024-10-14

## 2024-10-14 NOTE — TELEPHONE ENCOUNTER
Rx Refill Note  Requested Prescriptions     Pending Prescriptions Disp Refills    methylphenidate (Concerta) 36 MG CR tablet 30 tablet 0     Sig: Take 1 tablet by mouth Every Morning      Last office visit with office: 07/29/24  Next office visit with office: 10/21/24    UDS: none    DATE OF LAST REFILL: 09-19-24    Controlled Substance Agreement: not up to date    ALECIA OR ANGELINEP: 10/14/24         {TIP  Is Refill Pharmacy correct?:  Melodie Hahn MA  10/14/24, 09:49 CDT

## 2024-10-21 ENCOUNTER — OFFICE VISIT (OUTPATIENT)
Dept: FAMILY MEDICINE CLINIC | Facility: CLINIC | Age: 59
End: 2024-10-21
Payer: COMMERCIAL

## 2024-10-21 VITALS
OXYGEN SATURATION: 99 % | WEIGHT: 194.8 LBS | SYSTOLIC BLOOD PRESSURE: 132 MMHG | TEMPERATURE: 97.7 F | HEART RATE: 62 BPM | DIASTOLIC BLOOD PRESSURE: 88 MMHG | HEIGHT: 69 IN | BODY MASS INDEX: 28.85 KG/M2

## 2024-10-21 DIAGNOSIS — F90.9 ATTENTION DEFICIT HYPERACTIVITY DISORDER (ADHD), UNSPECIFIED ADHD TYPE: Primary | ICD-10-CM

## 2024-10-21 DIAGNOSIS — H10.11 ALLERGIC CONJUNCTIVITIS OF RIGHT EYE: ICD-10-CM

## 2024-10-21 PROCEDURE — 99213 OFFICE O/P EST LOW 20 MIN: CPT | Performed by: NURSE PRACTITIONER

## 2024-10-21 RX ORDER — OLOPATADINE HYDROCHLORIDE 2 MG/ML
1 SOLUTION/ DROPS OPHTHALMIC DAILY
Qty: 2.5 ML | Refills: 0 | Status: SHIPPED | OUTPATIENT
Start: 2024-10-21

## 2024-10-21 NOTE — PROGRESS NOTES
Subjective   Chief Complaint:  Reevaluation of ADHD    History of Present Illness  This is a 58-year-old male presents for reevaluation ADHD.  Reports good efficacy with Concerta.  No misuse to this juncture, reports doing well.  Reports having some itchy eyes on and off-reports increased tearing of the right eye with an oily substance.  He is discuss to par with his eye doctor and they acknowledged that then he reports he got better for a while but has been flaring up a little bit with the season changes.    Past Medical, Surgical, Social, and Family History:  No Known Allergies   Past Medical History:   Diagnosis Date    Abnormal CT of paranasal sinuses 08/20/2015    (Note 1 of 3)  NASAL SEPTUM:  The nasal septum is relatively midline.  MAXILLARY SINUSES:  The left maxillary sinus showed >5 m mucosal thickening and 50% opacification and the right maxillary sinus showed >5 mm mucosal thickening. The osteomeatal complex is obstructed bilaterally.    Abnormal CT of paranasal sinuses 08/20/2015    (Note 2 of 3)  NASAL TURBINATES: The inferior turbinates showed bilateral hypertrophy. The Cherie bullosa are not present.  ETHMOID SINUSES: The left ethmoid sinus showed >3 mm mucosal thickening and complete opacification. The lamina papyracea appears intact bilaterally.    Abnormal CT of paranasal sinuses 08/20/2015    (Note 3 of 3)  FRONTAL SINUSES:  The left frontal sinus showed complete opacification. The right frontal sinus showed complete opacification. SPENOID SINUSES: The left sphenoid sinus showed moderate mucosal thickening. The right sphenoid sinus showed moderate mucosal thickening.    Allergic rhinitis 12/01/2015    Allergic rhinitis     Asthma     Chronic rhinitis     Chronic sinusitis     Disturbances of sensation of smell and taste 12/01/2015    Diverticulosis     Elevated cholesterol     Elevated fasting glucose     History of adenomatous polyp of colon     History of colon polyps     Sensorineural hearing  loss, bilateral 12/01/2015    Subjective tinnitus 12/01/2015    Wound infection 07/27/2024    Was working on pipe & cut Left middle finger      Past Surgical History:   Procedure Laterality Date    ARM DEBRIDEMENT Left 7/31/2024    Procedure: LEFT MIDDLE FINGER INCISION AND DEBRIDEMENT;  Surgeon: Josef Miller MD;  Location: [facility] OR;  Service: Orthopedics;  Laterality: Left;    COLONOSCOPY  12/09/2013    Dr. Chavez-Polyp; Repeat 5 years (Abstracted from PCP's note)    COLONOSCOPY N/A 9/24/2021    One 6mm tubular adenomatous polyp in the ascending colon-Resected and retrieved-Clip (MR conditional) was placed; Diverticulosis in the left colon; Non-bleeding internal hemorrhoids; Repeat 5 years     ENDOSCOPY  08/14/2015    Dr. Mullins-Gastric ulcer; Repeat 8 weeks (Abstracted from PCP's note)    ENDOSCOPY  10/16/2015    Dr. Mullins-Dr. Mullins (Abstracted from PCP's note)    ENDOSCOPY N/A 10/15/2021    Procedure: ESOPHAGOGASTRODUODENOSCOPY WITH ANESTHESIA;  Surgeon: Isaura Fragoso MD;  Location: [facility] ENDOSCOPY;  Service: Gastroenterology;  Laterality: N/A;  pre screen  post balloon  Dr. Pedersen    FRACTURE SURGERY      FUNCTIONAL ENDOSCOPIC SINUS SURGERY  07/26/2016    HEMORRHOIDECTOMY      INCISION AND DRAINAGE ARM Left 8/14/2024    Procedure: LEFT MIDDLE FINGER INCISION AND DEBRIDEMENT;  Surgeon: Josef Miller MD;  Location: [facility] OR;  Service: Orthopedics;  Laterality: Left;    KNEE ARTHROPLASTY, PARTIAL REPLACEMENT Left     KNEE MENISCECTOMY Right     X 2    OTHER SURGICAL HISTORY      Open Reduction-Internal Fixation    ROTATOR CUFF REPAIR Right     SEPTOPLASTY      Per Dr. Urrutia 7-8 years ago      Social History     Socioeconomic History    Marital status:      Spouse name: Kelsy ()    Number of children: 2   Tobacco Use    Smoking status: Never     Passive exposure: Never    Smokeless tobacco: Never   Vaping Use    Vaping status: Never Used   Substance and Sexual Activity    Alcohol  "use: Yes     Comment: Occasionally     Drug use: Never    Sexual activity: Defer      Family History   Problem Relation Age of Onset    Diabetes Other     No Known Problems Mother     No Known Problems Father     Colon polyps Neg Hx     Colon cancer Neg Hx     Esophageal cancer Neg Hx     Liver cancer Neg Hx     Liver disease Neg Hx     Rectal cancer Neg Hx     Stomach cancer Neg Hx        Objective   Vital Signs  /88   Pulse 62   Temp 97.7 °F (36.5 °C) (Infrared)   Ht 174.2 cm (68.58\")   Wt 88.4 kg (194 lb 12.8 oz)   SpO2 99%   BMI 29.12 kg/m²    Physical Exam  Constitutional:       General: He is not in acute distress.  Cardiovascular:      Rate and Rhythm: Normal rate and regular rhythm.      Pulses: Normal pulses.      Heart sounds: No murmur heard.     No friction rub. No gallop.   Pulmonary:      Effort: Pulmonary effort is normal. No respiratory distress.      Breath sounds: Normal breath sounds. No wheezing or rhonchi.   Neurological:      Mental Status: He is alert.   Psychiatric:         Mood and Affect: Mood normal.         Behavior: Behavior normal.         Thought Content: Thought content normal.         Judgment: Judgment normal.       Assessment & Plan  1.  ADHD combined type-chronic stable, continue Concerta    2.  Allergic conjunctivitis-Pataday as needed in 3-14-day increments..    Follow-up:  The patient will Return in about 6 months (around 4/21/2025) for follow-Up.    Records and Results Reviewed:  I reviewed current medications as given by patient and allergy list    : Hybrid M[employer]- and Dragon Speech Recognition - No recording technology was used in the exam room during encounter.    Electronically signed by RIRI Hall, 10/21/24, 4:19 PM CDT.  "

## 2024-10-30 ENCOUNTER — TELEPHONE (OUTPATIENT)
Dept: OTOLARYNGOLOGY | Facility: CLINIC | Age: 59
End: 2024-10-30
Payer: COMMERCIAL

## 2024-10-30 LAB — DRUGS UR: NORMAL

## 2024-10-30 NOTE — TELEPHONE ENCOUNTER
Provider: TITI VILLARREAL    Caller: MAREK    Relationship to Patient: EMPLOYER-[facility] [employer]         Reason for Call: PT FAILED HEARING SCREENING THROUGH EMPLOYER AND NEEDS TO GET A SECOND HEARING SCREENING.    PLEASE CALL TO CONFIRM IF HEARING SCREENING CAN BE SCHEDULED WITH OUR OFFICE AND WHAT WOULD BE NEEDED.    IF UNABLE TO CALL EMPLOYER BACK, PLEASE CALL PT TO DISCUSS.        When was the patient last seen: 6/11/24

## 2024-10-31 NOTE — TELEPHONE ENCOUNTER
Called pt and lizeth and got pt scheduled for 11/22/24 at 10am. Pt aware. Lizeth faxing a pt that pt needs filled out at his visit.

## 2024-11-18 DIAGNOSIS — R41.840 POOR CONCENTRATION: ICD-10-CM

## 2024-11-19 RX ORDER — METHYLPHENIDATE HYDROCHLORIDE 36 MG/1
36 TABLET ORAL EVERY MORNING
Qty: 30 TABLET | Refills: 0 | Status: SHIPPED | OUTPATIENT
Start: 2024-11-19

## 2024-11-19 NOTE — TELEPHONE ENCOUNTER
Rx Refill Note  Requested Prescriptions     Pending Prescriptions Disp Refills    methylphenidate (Concerta) 36 MG CR tablet 30 tablet 0     Sig: Take 1 tablet by mouth Every Morning      Last office visit with office: 10/21/24  Next office visit with office: none    UDS: 10/21/24    DATE OF LAST REFILL: 10/14/24    Controlled Substance Agreement: up to date    ALECIA OR ANGELINEP: 11/19/24         {TIP  Is Refill Pharmacy correct?:  Melodie Hahn MA  11/19/24, 10:55 CST

## 2024-11-22 ENCOUNTER — OFFICE VISIT (OUTPATIENT)
Dept: OTOLARYNGOLOGY | Facility: CLINIC | Age: 59
End: 2024-11-22
Payer: COMMERCIAL

## 2024-11-22 ENCOUNTER — PROCEDURE VISIT (OUTPATIENT)
Dept: OTOLARYNGOLOGY | Facility: CLINIC | Age: 59
End: 2024-11-22
Payer: COMMERCIAL

## 2024-11-22 VITALS
HEART RATE: 62 BPM | HEIGHT: 69 IN | DIASTOLIC BLOOD PRESSURE: 66 MMHG | TEMPERATURE: 98.6 F | WEIGHT: 195.6 LBS | SYSTOLIC BLOOD PRESSURE: 122 MMHG | BODY MASS INDEX: 28.97 KG/M2

## 2024-11-22 DIAGNOSIS — H93.13 TINNITUS, BILATERAL: ICD-10-CM

## 2024-11-22 DIAGNOSIS — H90.3 SENSORINEURAL HEARING LOSS, BILATERAL: Primary | ICD-10-CM

## 2024-11-22 DIAGNOSIS — H90.3 SENSORINEURAL HEARING LOSS (SNHL), BILATERAL: Primary | ICD-10-CM

## 2024-11-22 DIAGNOSIS — H93.13 TINNITUS OF BOTH EARS: ICD-10-CM

## 2024-11-22 NOTE — PROGRESS NOTES
RIRI Villatoro ENT [facility] EAR [facility] THROAT  [address], [address]  [facility] [address]  Fax [phone]  Phone [phone]      Visit Type: FOLLOW UP   Chief Complaint   Patient presents with    Hearing Problem     Failed work Hearing test           HISTORY OBTAINED FROM: patient  HPI  He presents for a follow up evaluation. Failed hearing screening at work.      Past Medical History:   Diagnosis Date    Abnormal CT of paranasal sinuses 08/20/2015    (Note 1 of 3)  NASAL SEPTUM:  The nasal septum is relatively midline.  MAXILLARY SINUSES:  The left maxillary sinus showed >5 m mucosal thickening and 50% opacification and the right maxillary sinus showed >5 mm mucosal thickening. The osteomeatal complex is obstructed bilaterally.    Abnormal CT of paranasal sinuses 08/20/2015    (Note 2 of 3)  NASAL TURBINATES: The inferior turbinates showed bilateral hypertrophy. The Cherie bullosa are not present.  ETHMOID SINUSES: The left ethmoid sinus showed >3 mm mucosal thickening and complete opacification. The lamina papyracea appears intact bilaterally.    Abnormal CT of paranasal sinuses 08/20/2015    (Note 3 of 3)  FRONTAL SINUSES:  The left frontal sinus showed complete opacification. The right frontal sinus showed complete opacification. SPENOID SINUSES: The left sphenoid sinus showed moderate mucosal thickening. The right sphenoid sinus showed moderate mucosal thickening.    Allergic rhinitis 12/01/2015    Allergic rhinitis     Asthma     Chronic rhinitis     Chronic sinusitis     Disturbances of sensation of smell and taste 12/01/2015    Diverticulosis     Elevated cholesterol     Elevated fasting glucose     History of adenomatous polyp of colon     History of colon polyps     Sensorineural hearing loss, bilateral 12/01/2015    Subjective tinnitus 12/01/2015    Wound infection 07/27/2024    Was working on pipe & cut Left middle finger        Past Surgical History:   Procedure Laterality Date    ARM DEBRIDEMENT Left 7/31/2024    Procedure: LEFT MIDDLE FINGER INCISION AND DEBRIDEMENT;  Surgeon: Josef Miller MD;  Location:  [facility] OR;  Service: Orthopedics;  Laterality: Left;    COLONOSCOPY  12/09/2013    Dr. Chavez-Polyp; Repeat 5 years (Abstracted from PCP's note)    COLONOSCOPY N/A 9/24/2021    One 6mm tubular adenomatous polyp in the ascending colon-Resected and retrieved-Clip (MR conditional) was placed; Diverticulosis in the left colon; Non-bleeding internal hemorrhoids; Repeat 5 years     ENDOSCOPY  08/14/2015    Dr. Mullins-Gastric ulcer; Repeat 8 weeks (Abstracted from PCP's note)    ENDOSCOPY  10/16/2015    Dr. Mullins-Dr. Mullins (Abstracted from PCP's note)    ENDOSCOPY N/A 10/15/2021    Procedure: ESOPHAGOGASTRODUODENOSCOPY WITH ANESTHESIA;  Surgeon: Isaura Fragoso MD;  Location: [facility] ENDOSCOPY;  Service: Gastroenterology;  Laterality: N/A;  pre screen  post balloon  Dr. Pedersen    FRACTURE SURGERY      FUNCTIONAL ENDOSCOPIC SINUS SURGERY  07/26/2016    HEMORRHOIDECTOMY      INCISION AND DRAINAGE ARM Left 8/14/2024    Procedure: LEFT MIDDLE FINGER INCISION AND DEBRIDEMENT;  Surgeon: Josef Miller MD;  Location: [facility] OR;  Service: Orthopedics;  Laterality: Left;    KNEE ARTHROPLASTY, PARTIAL REPLACEMENT Left     KNEE MENISCECTOMY Right     X 2    OTHER SURGICAL HISTORY      Open Reduction-Internal Fixation    ROTATOR CUFF REPAIR Right     SEPTOPLASTY      Per Dr. Urrutia 7-8 years ago       Family History: His family history includes Diabetes in an other family member; No Known Problems in his father and mother.     Social History: He  reports that he has never smoked. He has never been exposed to tobacco smoke. He has never used smokeless tobacco. He reports current alcohol use. He reports that he does not use drugs.    Home Medications:  Diclofenac Sodium, albuterol sulfate HFA, cetirizine, desvenlafaxine, fluticasone,  methylphenidate, montelukast, olopatadine, and rosuvastatin    Allergies:  He has No Known Allergies.       Vital Signs:      ENT Physical Exam  Ear  Hearing: intact;  Auricles: right auricle normal; left auricle normal;  External Mastoids: right external mastoid normal; left external mastoid normal;  Ear Canals: right ear canal normal; left ear canal normal;  Tympanic Membranes: right tympanic membrane normal; left tympanic membrane normal;           Result Review       RESULTS REVIEW    I have reviewed the patients old records in the chart.   The following results/records were reviewed:   Procedure visit with Andreina Barcenas AUD (11/22/2024) bilateral high frequency snhl, type A bilaterally       Assessment & Plan  Sensorineural hearing loss (SNHL), bilateral    Tinnitus of both ears              Conservative management.  Hearing is stable, call for any changes  No follow-ups on file.        Electronically signed by RIRI Villatoro 11/22/24 11:04 AM CST.

## 2024-11-22 NOTE — PROGRESS NOTES
AUDIOMETRIC EVALUATION      Name:  Selvin Garcia  :  1965  Age:  58 y.o.  Date of Evaluation:  2024       History:  Mr. Garcia is seen today at the request of RIRI Villatoro for a follow-up hearing evaluation. Patient has a history of bilateral sensorineural hearing loss. Previous audio was on 2023 with us and 10/03/2024 with work.    Audiologic Information:  PETs: No  Other otologic surgical history: No  Aural Pressure/Fullness: No  Otalgia: No  Otorrhea: No  Tinnitus: Constant bilateral ringing  Dizziness: No  Other significant history: None    **Case history obtained in office and through EMR system      EVALUATION:        RESULTS:    Otoscopic Evaluation:  Right: clear canal, tympanic membrane visualized  Left: clear canal, tympanic membrane visualized    Tympanometry (226 Hz):  Right: Type A  Left: Type A    Pure Tone Audiometry:    Right: Normal (250Hz-2000Hz) sloping to moderately severe sensorineural hearing loss   Left: Normal (250Hz-2000Hz) sloping to severe sensorineural hearing loss    Speech Audiometry:   Right: Speech Reception Threshold (SRT) was obtained at 10 dB HL  Word Recognition scores - Excellent (100)% at 50 dB, using NU-6 List 1A with 10 words  Left: Speech Reception Threshold (SRT) was obtained at 10 dB HL  Word Recognition scores - Excellent (100)% at 50 dB, using NU-6 List 1A with 10 words  SRT/PTA in good agreement bilaterally.    IMPRESSIONS:  Tympanometry showed normal middle ear pressure and static compliance, consistent with normal middle ear function for both ears. Pure tone thresholds for both ears show sensorineural hearing loss, suggesting normal outer/middle ear function and abnormal cochlear/retrocochlear function. Patient was counseled with regard to the findings.    Amplification needs:  Patient could benefit from amplification if they feel increased communication difficulties.    Diagnosis:  1. Sensorineural hearing loss, bilateral     2. Tinnitus, bilateral         RECOMMENDATIONS/PLAN:  Follow-up recommendations per RIRI Villatoro.  Practice good communication strategies to assist with everyday listening (eye contact with speakers, reduce background noise, encourage others to communicate clearly and slowly).  Tinnitus management strategies. Consider use of amplification, a white noise machine, low level TV/radio, or fan at night to help mask the tinnitus. It is also recommended to avoid caffeine, alcohol, tobacco, salt, high dose NSAIDs, and to increase hydration. Additional resources: Resound Relief john and American Tinnitus Association (www.mary.org).  Consistent utilization of hearing protection devices in noisy environments.  Hearing aid evaluation and counseling upon medical clearance and patient motivation.   Repeat hearing evaluation if changes in hearing are noted or concerns arise.  Discussed results and recommendations with patient. Questions were addressed and the patient was encouraged to contact our department should concerns arise.        Andreina Goodwin, CCC-A, F-AAA  Doctor of Audiology

## 2024-12-06 ENCOUNTER — TELEPHONE (OUTPATIENT)
Dept: OTOLARYNGOLOGY | Facility: CLINIC | Age: 59
End: 2024-12-06

## 2024-12-06 NOTE — TELEPHONE ENCOUNTER
Caller: MAREK    Relationship: Other    Best call back number: [phone]    What form or medical record are you requesting: AUDIO SCREENING RESULTS    Who is requesting this form or medical record from you: [employer]    How would you like to receive the form or medical records (pick-up, mail, fax):     FAX# [phone]    Timeframe paperwork needed: AS SOON AS POSSIBLE    Additional notes: MAREK WITH [employer] CALLED AND REQUESTED A COPY OF AUDIO SCREEING FROM 11/22/24 TO BE FAXED.

## 2024-12-17 DIAGNOSIS — R41.840 POOR CONCENTRATION: ICD-10-CM

## 2024-12-17 RX ORDER — METHYLPHENIDATE HYDROCHLORIDE 36 MG/1
36 TABLET ORAL EVERY MORNING
Qty: 30 TABLET | Refills: 0 | Status: SHIPPED | OUTPATIENT
Start: 2024-12-17

## 2024-12-17 NOTE — TELEPHONE ENCOUNTER
Rx Refill Note  Requested Prescriptions     Pending Prescriptions Disp Refills    methylphenidate (Concerta) 36 MG CR tablet 30 tablet 0     Sig: Take 1 tablet by mouth Every Morning      Last office visit with office: 10/21/24  Next office visit with office: no future appointment on file     UDS: 10/21/24    DATE OF LAST REFILL: 11/19/24    Controlled Substance Agreement: up to date    [place]          {TIP  Is Refill Pharmacy correct?:  Alexa Jean MA  12/17/24, 08:18 CST

## 2025-01-07 RX ORDER — ROSUVASTATIN CALCIUM 20 MG/1
20 TABLET, COATED ORAL NIGHTLY
Qty: 90 TABLET | Refills: 3 | Status: SHIPPED | OUTPATIENT
Start: 2025-01-07

## 2025-01-07 RX ORDER — DESVENLAFAXINE 50 MG/1
50 TABLET, FILM COATED, EXTENDED RELEASE ORAL DAILY
Qty: 90 TABLET | Refills: 3 | Status: SHIPPED | OUTPATIENT
Start: 2025-01-07

## 2025-01-10 DIAGNOSIS — R41.840 POOR CONCENTRATION: ICD-10-CM

## 2025-01-13 RX ORDER — METHYLPHENIDATE HYDROCHLORIDE 36 MG/1
36 TABLET ORAL EVERY MORNING
Qty: 30 TABLET | Refills: 0 | Status: SHIPPED | OUTPATIENT
Start: 2025-01-13

## 2025-01-13 NOTE — TELEPHONE ENCOUNTER
Rx Refill Note  Requested Prescriptions     Pending Prescriptions Disp Refills    methylphenidate (Concerta) 36 MG CR tablet 30 tablet 0     Sig: Take 1 tablet by mouth Every Morning      Last office visit with office: 10/21/24  Next office visit with office: none    UDS: 10/21/24    DATE OF LAST REFILL: 12/17/24    Controlled Substance Agreement: up to date    ALECIA OR LIANET: LIANET WNL          {TIP  Is Refill Pharmacy correct?:  Alexa Jean MA  01/13/25, 09:49 CST

## 2025-02-07 DIAGNOSIS — R41.840 POOR CONCENTRATION: ICD-10-CM

## 2025-02-07 RX ORDER — DESVENLAFAXINE 50 MG/1
50 TABLET, FILM COATED, EXTENDED RELEASE ORAL DAILY
Qty: 90 TABLET | Refills: 3 | Status: SHIPPED | OUTPATIENT
Start: 2025-02-07

## 2025-02-07 RX ORDER — METHYLPHENIDATE HYDROCHLORIDE 36 MG/1
36 TABLET ORAL EVERY MORNING
Qty: 30 TABLET | Refills: 0 | OUTPATIENT
Start: 2025-02-07

## 2025-02-07 RX ORDER — ROSUVASTATIN CALCIUM 20 MG/1
20 TABLET, COATED ORAL NIGHTLY
Qty: 90 TABLET | Refills: 3 | Status: SHIPPED | OUTPATIENT
Start: 2025-02-07

## 2025-02-07 NOTE — TELEPHONE ENCOUNTER
Rx Refill Note  Requested Prescriptions     Pending Prescriptions Disp Refills    methylphenidate (Concerta) 36 MG CR tablet 30 tablet 0     Sig: Take 1 tablet by mouth Every Morning      Last office visit with office: 10/21/2024  Next office visit with office: NONE    UDS: 10/21/2024    DATE OF LAST REFILL: 01/14/2025    Controlled Substance Agreement: up to date    [place] 7 DAYS EARLY         {TIP  Is Refill Pharmacy correct?:  Chioma Haji MA  02/07/25, 08:44 CST

## 2025-02-13 DIAGNOSIS — R41.840 POOR CONCENTRATION: ICD-10-CM

## 2025-02-13 NOTE — TELEPHONE ENCOUNTER
Rx Refill Note  Requested Prescriptions     Pending Prescriptions Disp Refills    methylphenidate (Concerta) 36 MG CR tablet 30 tablet 0     Sig: Take 1 tablet by mouth Every Morning      Last office visit with office: 10/21/24  Next office visit with office: 02-19-25    UDS: 10/21/24    DATE OF LAST REFILL: 01/13/25    Controlled Substance Agreement: up to date    ALECIA OR [facility]: 02-13-25         {TIP  Is Refill Pharmacy correct?:  Melodie Hahn MA  02/13/25, 10:29 CST

## 2025-02-14 RX ORDER — METHYLPHENIDATE HYDROCHLORIDE 36 MG/1
36 TABLET ORAL EVERY MORNING
Qty: 30 TABLET | Refills: 0 | Status: SHIPPED | OUTPATIENT
Start: 2025-02-14

## 2025-02-25 ENCOUNTER — OFFICE VISIT (OUTPATIENT)
Dept: FAMILY MEDICINE CLINIC | Facility: CLINIC | Age: 60
End: 2025-02-25
Payer: COMMERCIAL

## 2025-02-25 VITALS
HEIGHT: 69 IN | OXYGEN SATURATION: 98 % | WEIGHT: 200 LBS | DIASTOLIC BLOOD PRESSURE: 80 MMHG | SYSTOLIC BLOOD PRESSURE: 118 MMHG | HEART RATE: 70 BPM | TEMPERATURE: 97.3 F | BODY MASS INDEX: 29.62 KG/M2

## 2025-02-25 DIAGNOSIS — R51.9 GENERALIZED HEADACHES: Primary | ICD-10-CM

## 2025-02-25 PROCEDURE — 99213 OFFICE O/P EST LOW 20 MIN: CPT | Performed by: NURSE PRACTITIONER

## 2025-02-25 RX ORDER — NAPROXEN 500 MG/1
500 TABLET ORAL DAILY PRN
Qty: 30 TABLET | Refills: 1 | Status: SHIPPED | OUTPATIENT
Start: 2025-02-25

## 2025-02-26 NOTE — PROGRESS NOTES
Subjective   Chief Complaint:  Generalized headaches    History of Present Illness  The patient is an 79-year-old male presenting today with generalized headaches.    He reports experiencing dull, frontal headaches approximately 5 times per week, typically in the afternoon or evening. These headaches are often exacerbated by tension and stress. He does not experience any visual disturbances.    Past Medical, Surgical, Social, and Family History:  No Known Allergies   Past Medical History:   Diagnosis Date    Abnormal CT of paranasal sinuses 08/20/2015    (Note 1 of 3)  NASAL SEPTUM:  The nasal septum is relatively midline.  MAXILLARY SINUSES:  The left maxillary sinus showed >5 m mucosal thickening and 50% opacification and the right maxillary sinus showed >5 mm mucosal thickening. The osteomeatal complex is obstructed bilaterally.    Abnormal CT of paranasal sinuses 08/20/2015    (Note 2 of 3)  NASAL TURBINATES: The inferior turbinates showed bilateral hypertrophy. The Cherie bullosa are not present.  ETHMOID SINUSES: The left ethmoid sinus showed >3 mm mucosal thickening and complete opacification. The lamina papyracea appears intact bilaterally.    Abnormal CT of paranasal sinuses 08/20/2015    (Note 3 of 3)  FRONTAL SINUSES:  The left frontal sinus showed complete opacification. The right frontal sinus showed complete opacification. SPENOID SINUSES: The left sphenoid sinus showed moderate mucosal thickening. The right sphenoid sinus showed moderate mucosal thickening.    Allergic rhinitis 12/01/2015    Allergic rhinitis     Asthma     Chronic rhinitis     Chronic sinusitis     Disturbances of sensation of smell and taste 12/01/2015    Diverticulosis     Elevated cholesterol     Elevated fasting glucose     History of adenomatous polyp of colon     History of colon polyps     Sensorineural hearing loss, bilateral 12/01/2015    Subjective tinnitus 12/01/2015    Wound infection 07/27/2024    Was working on pipe &  cut Left middle finger      Past Surgical History:   Procedure Laterality Date    ARM DEBRIDEMENT Left 7/31/2024    Procedure: LEFT MIDDLE FINGER INCISION AND DEBRIDEMENT;  Surgeon: Josef Miller MD;  Location:  [facility] OR;  Service: Orthopedics;  Laterality: Left;    COLONOSCOPY  12/09/2013    Dr. Chavez-Polyp; Repeat 5 years (Abstracted from PCP's note)    COLONOSCOPY N/A 9/24/2021    One 6mm tubular adenomatous polyp in the ascending colon-Resected and retrieved-Clip (MR conditional) was placed; Diverticulosis in the left colon; Non-bleeding internal hemorrhoids; Repeat 5 years     ENDOSCOPY  08/14/2015    Dr. Mullins-Gastric ulcer; Repeat 8 weeks (Abstracted from PCP's note)    ENDOSCOPY  10/16/2015    Dr. Mullins-Dr. Mullins (Abstracted from PCP's note)    ENDOSCOPY N/A 10/15/2021    Procedure: ESOPHAGOGASTRODUODENOSCOPY WITH ANESTHESIA;  Surgeon: Isaura Fragoso MD;  Location: [facility] ENDOSCOPY;  Service: Gastroenterology;  Laterality: N/A;  pre screen  post balloon  Dr. Pedersen    FRACTURE SURGERY      FUNCTIONAL ENDOSCOPIC SINUS SURGERY  07/26/2016    HEMORRHOIDECTOMY      INCISION AND DRAINAGE ARM Left 8/14/2024    Procedure: LEFT MIDDLE FINGER INCISION AND DEBRIDEMENT;  Surgeon: Josef Miller MD;  Location: [facility] OR;  Service: Orthopedics;  Laterality: Left;    KNEE ARTHROPLASTY, PARTIAL REPLACEMENT Left     KNEE MENISCECTOMY Right     X 2    OTHER SURGICAL HISTORY      Open Reduction-Internal Fixation    ROTATOR CUFF REPAIR Right     SEPTOPLASTY      Per Dr. Urrutia 7-8 years ago      Social History     Socioeconomic History    Marital status:      Spouse name: Kelsy ()    Number of children: 2   Tobacco Use    Smoking status: Never     Passive exposure: Never    Smokeless tobacco: Never   Vaping Use    Vaping status: Never Used   Substance and Sexual Activity    Alcohol use: Yes     Comment: Occasionally     Drug use: Never    Sexual activity: Defer      Family History   Problem  "Relation Age of Onset    Diabetes Other     No Known Problems Mother     No Known Problems Father     Colon polyps Neg Hx     Colon cancer Neg Hx     Esophageal cancer Neg Hx     Liver cancer Neg Hx     Liver disease Neg Hx     Rectal cancer Neg Hx     Stomach cancer Neg Hx        Objective   Vital Signs  /80   Pulse 70   Temp 97.3 °F (36.3 °C) (Infrared)   Ht 174.2 cm (68.58\")   Wt 90.7 kg (200 lb)   SpO2 98%   BMI 29.90 kg/m²    Physical Exam  Constitutional:       General: He is not in acute distress.  Cardiovascular:      Rate and Rhythm: Normal rate and regular rhythm.      Pulses: Normal pulses.      Heart sounds: No murmur heard.     No friction rub. No gallop.   Pulmonary:      Effort: Pulmonary effort is normal. No respiratory distress.      Breath sounds: Normal breath sounds. No wheezing or rhonchi.   Neurological:      Mental Status: He is alert and oriented to person, place, and time. Mental status is at baseline.   Psychiatric:         Behavior: Behavior normal.         Judgment: Judgment normal.       Assessment & Plan   Assessment & Plan  1. Generalized headaches.  Naproxen 500 mg daily as needed. Advised to let me know if the frequency does not decrease or ineffective, we several \"next steps\" that we can potentially talk about.    Follow-up:  The patient will Return for follow-up as needed.    Records and Results Reviewed:  I reviewed current medications as given by patient and allergy list.    : Hybrid MUKESH Co- and Dragon Speech Recognition - No recording technology was used in the exam room during encounter.    Electronically signed by RIRI Hall, 02/26/25, 7:24 AM CST.  "

## 2025-03-14 DIAGNOSIS — R41.840 POOR CONCENTRATION: ICD-10-CM

## 2025-03-17 NOTE — TELEPHONE ENCOUNTER
Rx Refill Note  Requested Prescriptions     Pending Prescriptions Disp Refills    methylphenidate (Concerta) 36 MG CR tablet 30 tablet 0     Sig: Take 1 tablet by mouth Every Morning      Last office visit with office: 02/25/2025  Next office visit with office: none    UDS: 10/21/2024    DATE OF LAST REFILL: 02/14/2025    Controlled Substance Agreement: up to date    ALECIA OR LIANET: wnl         {TIP  Is Refill Pharmacy correct?:  Chioma Haji MA  03/17/25, 16:47 CDT

## 2025-03-18 RX ORDER — METHYLPHENIDATE HYDROCHLORIDE 36 MG/1
36 TABLET ORAL EVERY MORNING
Qty: 30 TABLET | Refills: 0 | Status: SHIPPED | OUTPATIENT
Start: 2025-03-18

## 2025-03-24 ENCOUNTER — OFFICE VISIT (OUTPATIENT)
Dept: FAMILY MEDICINE CLINIC | Facility: CLINIC | Age: 60
End: 2025-03-24
Payer: COMMERCIAL

## 2025-03-24 ENCOUNTER — HOSPITAL ENCOUNTER (OUTPATIENT)
Dept: GENERAL RADIOLOGY | Facility: HOSPITAL | Age: 60
Discharge: HOME | End: 2025-03-24
Admitting: NURSE PRACTITIONER
Payer: COMMERCIAL

## 2025-03-24 VITALS
OXYGEN SATURATION: 96 % | DIASTOLIC BLOOD PRESSURE: 70 MMHG | HEIGHT: 69 IN | BODY MASS INDEX: 28.58 KG/M2 | SYSTOLIC BLOOD PRESSURE: 116 MMHG | WEIGHT: 193 LBS | TEMPERATURE: 98.7 F | HEART RATE: 69 BPM

## 2025-03-24 DIAGNOSIS — R05.1 ACUTE COUGH: ICD-10-CM

## 2025-03-24 DIAGNOSIS — J02.9 SORE THROAT: ICD-10-CM

## 2025-03-24 DIAGNOSIS — R06.02 SHORTNESS OF BREATH: ICD-10-CM

## 2025-03-24 DIAGNOSIS — R09.81 NASAL CONGESTION: Primary | ICD-10-CM

## 2025-03-24 DIAGNOSIS — J45.21 MILD INTERMITTENT ASTHMA WITH EXACERBATION: ICD-10-CM

## 2025-03-24 LAB
EXPIRATION DATE: NORMAL
FLUAV AG UPPER RESP QL IA.RAPID: NOT DETECTED
FLUBV AG UPPER RESP QL IA.RAPID: NOT DETECTED
INTERNAL CONTROL: NORMAL
Lab: NORMAL
SARS-COV-2 AG UPPER RESP QL IA.RAPID: NOT DETECTED

## 2025-03-24 PROCEDURE — 71046 X-RAY EXAM CHEST 2 VIEWS: CPT

## 2025-03-24 PROCEDURE — 99214 OFFICE O/P EST MOD 30 MIN: CPT | Performed by: NURSE PRACTITIONER

## 2025-03-24 PROCEDURE — 87428 SARSCOV & INF VIR A&B AG IA: CPT | Performed by: NURSE PRACTITIONER

## 2025-03-24 RX ORDER — AZITHROMYCIN 250 MG/1
TABLET, FILM COATED ORAL
Qty: 6 TABLET | Refills: 0 | Status: SHIPPED | OUTPATIENT
Start: 2025-03-24 | End: 2025-03-29

## 2025-03-24 RX ORDER — PREDNISONE 20 MG/1
TABLET ORAL
Qty: 10 TABLET | Refills: 0 | Status: SHIPPED | OUTPATIENT
Start: 2025-03-24

## 2025-03-24 NOTE — PROGRESS NOTES
"Chief Complaint  Nasal Congestion, Cough, Sore Throat, Headache, and Dizziness    Subjective      Selvin Garcia presents to [facility]  HPI: Patient is a 59 y.o. male who is here today with complaint of sore throat, headache, body aches, chest heaviness (feels like he cannot get a good breath in), dry cough. Symptoms started last week. He does have history of mild asthma, uses Albuterol inhaler as needed which is not often. Reports he did have a bout of pneumonia last year.     Objective   Vital Signs:  /70   Pulse 69   Temp 98.7 °F (37.1 °C)   Ht 174.2 cm (68.58\")   Wt 87.5 kg (193 lb)   SpO2 96%   BMI 28.85 kg/m²   Estimated body mass index is 28.85 kg/m² as calculated from the following:    Height as of this encounter: 174.2 cm (68.58\").    Weight as of this encounter: 87.5 kg (193 lb).            Physical Exam  Constitutional:       Appearance: Normal appearance.   HENT:      Right Ear: Tympanic membrane is erythematous.      Left Ear: Tympanic membrane is erythematous.      Mouth/Throat:      Pharynx: Posterior oropharyngeal erythema and postnasal drip present.   Cardiovascular:      Rate and Rhythm: Normal rate and regular rhythm.   Pulmonary:      Effort: Pulmonary effort is normal.      Breath sounds: Examination of the right-upper field reveals rhonchi. Examination of the left-upper field reveals rhonchi. Wheezing (throughout) and rhonchi present.   Lymphadenopathy:      Cervical: No cervical adenopathy.   Neurological:      Mental Status: He is alert.   Psychiatric:         Mood and Affect: Mood normal.        Result Review :  The following labs/imaging/notes were reviewed and discussed with the patient by RIRI Mathew on 03/24/2025:             Assessment and Plan   Diagnoses and all orders for this visit:    1. Nasal congestion (Primary)  -     POCT SARS-CoV-2 Antigen HOANG + Flu    2. Sore throat  -     POCT SARS-CoV-2 Antigen HOANG + Flu    3. Acute " cough  -     POCT SARS-CoV-2 Antigen HOANG + Flu  -     XR Chest 2 View; Future    4. Shortness of breath  -     XR Chest 2 View; Future    I have sent oral steroid and ordered chest x-ray. Will notify with results. Increase water intake. If symptoms become severe, go to ER.     Chest x-ray showed no acute abnormalities. I went ahead and sent Azithromycin to cover due to length of symptoms/severity of symptoms. Take as directed until completed. If symptoms persist, notify clinic.            Follow Up  No follow-ups on file.  Patient was given instructions and counseling regarding his condition or for health maintenance advice. Please see specific information pulled into the AVS if appropriate.

## 2025-03-31 DIAGNOSIS — J32.9 CHRONIC SINUSITIS, UNSPECIFIED LOCATION: ICD-10-CM

## 2025-03-31 RX ORDER — CETIRIZINE HYDROCHLORIDE 10 MG/1
10 TABLET ORAL DAILY
Qty: 90 TABLET | Refills: 3 | Status: SHIPPED | OUTPATIENT
Start: 2025-03-31

## 2025-03-31 RX ORDER — MONTELUKAST SODIUM 10 MG/1
10 TABLET ORAL NIGHTLY
Qty: 90 TABLET | Refills: 3 | Status: SHIPPED | OUTPATIENT
Start: 2025-03-31

## 2025-04-19 DIAGNOSIS — R41.840 POOR CONCENTRATION: ICD-10-CM

## 2025-04-21 RX ORDER — METHYLPHENIDATE HYDROCHLORIDE 36 MG/1
36 TABLET ORAL EVERY MORNING
Qty: 30 TABLET | Refills: 0 | Status: SHIPPED | OUTPATIENT
Start: 2025-04-21

## 2025-04-21 NOTE — TELEPHONE ENCOUNTER
Rx Refill Note  Requested Prescriptions     Pending Prescriptions Disp Refills    methylphenidate (Concerta) 36 MG CR tablet 30 tablet 0     Sig: Take 1 tablet by mouth Every Morning      Last office visit with office: 03/24/2025  Next office visit with office: none    UDS: 10/21/2024    DATE OF LAST REFILL: 03/18/2025    Controlled Substance Agreement: up to date    ALECIA OR LIANET: website down         {TIP  Is Refill Pharmacy correct?:  Chioma Haji MA  04/21/25, 08:52 CDT

## 2025-05-16 DIAGNOSIS — R41.840 POOR CONCENTRATION: ICD-10-CM

## 2025-05-16 RX ORDER — DESVENLAFAXINE 50 MG/1
50 TABLET, FILM COATED, EXTENDED RELEASE ORAL DAILY
Qty: 90 TABLET | Refills: 3 | Status: SHIPPED | OUTPATIENT
Start: 2025-05-16

## 2025-05-16 NOTE — TELEPHONE ENCOUNTER
Rx Refill Note  Requested Prescriptions     Pending Prescriptions Disp Refills    methylphenidate (Concerta) 36 MG CR tablet 30 tablet 0     Sig: Take 1 tablet by mouth Every Morning      Last office visit with prescribing clinician: Visit date not found   Last telemedicine visit with prescribing clinician: Visit date not found   Next office visit with prescribing clinician: Visit date not found       Helena Capps MA  05/16/25, 12:57 CDT

## 2025-05-19 RX ORDER — METHYLPHENIDATE HYDROCHLORIDE 36 MG/1
36 TABLET ORAL EVERY MORNING
Qty: 30 TABLET | Refills: 0 | Status: SHIPPED | OUTPATIENT
Start: 2025-05-19

## 2025-06-16 NOTE — PROGRESS NOTES
Received fax from [employer] stating that Dexcom G7 will not be covered without reader being sen to pharmacy as well.     Prescription has been sent.   Subjective   Selvin Garcia is a 51 y.o. male presenting with chief complaint of:   Chief Complaint   Patient presents with   • DOT Physical       History of Present Illness :  Alone.  Has an acute issue today: needs work/dot.   Has few chronic problems; interval appointment.  One of these problems is asthma.       Other chronic problem/s to consider:   Allergic rhinitis:  This has been present for years/over a year.  The nasal/sinus congestion on/off has been present for years and is currently stable/ same as usual.   Asthma/COPD/chronic lung disease: The has been present for years/over a year.  It is chronic.  The problem is stable. The patient had no significant smoking history.     Has an/another acute issue today: none.    The following portions of the patient's history were reviewed and updated as appropriate: allergies, current medications, past family history, past medical history, past social history, past surgical history and problem list.  Records acquired and reviewed; TCC migrated.      Current Outpatient Prescriptions:   •  azelastine (OPTIVAR) 0.05 % ophthalmic solution, Administer 1 drop to both eyes 2 (Two) Times a Day for 90 days., Disp: 3 each, Rfl: 2  •  cetirizine (zyrTEC) 10 MG tablet, Take 1 tablet by mouth Daily for 90 days., Disp: 90 tablet, Rfl: 2  •  fluticasone (FLONASE) 50 MCG/ACT nasal spray, 2 sprays into each nostril Daily for 90 days., Disp: 3 each, Rfl: 2  •  montelukast (SINGULAIR) 10 MG tablet, Take 10 mg by mouth Every Night., Disp: , Rfl:   •  PROAIR  (90 BASE) MCG/ACT inhaler, USE 2 INHALATIONS EVERY 4 HOURS AS NEEDED FOR WHEEZING, Disp: 25.5 g, Rfl: 1  •  QVAR 80 MCG/ACT inhaler, , Disp: , Rfl:     Allergies   Allergen Reactions   • Lexapro [Escitalopram Oxalate]    • Ultram [Tramadol Hcl]        Review of Systems   See form  GENERAL:  Active home/work. Sleep is ok. No fever now.  ENDO:  No syncope, near or diaphoretic sweaty spells.  HEENT: No head injury  headache,   No vision change, No hearing loss.  Ears without pain/drainage.  No sore throat.  No nasal/sinus congestion/drainage. No epistaxis.  CHEST: No chest wall tenderness or mass. No cough, without wheeze, SOB; no hemoptysis.  CV: No chest pain, palpatations, ankle edema.  GI: No heartburn, dysphagia.  No abdominal pain, diarrhea, constipation, rectal bleeding, or melena.    Colonoscopy+polyp/LH/Bod/12.9.13/5y  EGD+biop-gastric ulcer/[place]/LH/8.14.15/8w  EGD+biop/LH/[place]/10.16.15    :  Voids without dysuria, or incontience to completion.  ORTHO: No painful/swollen joints but various on /off sore.  No sore neck or back.  No acute neck or back pain without recent injury.   NEURO: No dizziness, weakness of extremities.  No numbness/parethesias.   PSYCH: No memory loss.  Mood good; not anxious, depressed but/and not suicidal.  Tolerated stress.     Results for orders placed or performed during the hospital encounter of 07/26/16   CBC (No diff)   Result Value Ref Range    WBC 5.15 4.80 - 10.80 K/mcL    RBC 5.29 4.80 - 5.90 M/mcL    Hemoglobin 16.1 14.0 - 18.0 g/dL    Hematocrit 44.7 40.0 - 52.0 %    MCV 84.5 82.0 - 95.0 fL    MCH 30.4 28.0 - 32.0 pg    MCHC 36.0 33.0 - 36.0 gm/dL    RDW-SD 38.6 (L) 40.0 - 54.0 fL    RDW-CV 12.7 12.0 - 15.0 %    RDW 12.7 12 - 15 %    Platelets 264 130 - 400 K/mcL   Comprehensive metabolic panel   Result Value Ref Range    Sodium 143 135 - 145 mmol/L    Potassium 4.9 3.5 - 5.3 mmol/L    Chloride 104 98 - 110 mmol/L    CO2 27 24 - 31 mmol/L    Glucose 85 70 - 100 mg/dL    BUN 19 5 - 21 mg/dL    Creatinine 0.97 0.5 - 1.4 mg/dL    Calcium 9.0 8.4 - 10.4 mg/dL    Total Protein 7.2 6.3 - 8.7 g/dL    Albumin 4.1 3.5 - 5.0 g/dL    Total Bilirubin 0.9 0.1 - 1.0 mg/dL    Alkaline Phosphatase 64 24 - 120 Units/L    AST (SGOT) 31 7 - 45 Units/L    ALT (SGPT) 20 0 - 54 Units/L    Anion Gap 12 4 - 13 mmol/L    Est GFR by Clearance 82 ml/min/1.732   Converted Surgical Pathology   Result Value Ref Range  "   CONVERTED (HISTORICAL) CASE TYPE Surgical Pathology     CONVERTED (HISTORICAL) ACCESSION NUMBER [id number]     CONVERTED (HISTORICAL) RESULT STATUS FINAL     CONVERTED (HISTORICAL) SPECIMEN DESCRIPTION Right and Left Sinus contents        No results found for: HGBA1C    Lab Results   Component Value Date     07/19/2016    K 4.9 07/19/2016     07/19/2016    CO2 27 07/19/2016    GLUCOSE 85 07/19/2016    BUN 19 07/19/2016    CREATININE 0.97 07/19/2016    CALCIUM 9.0 07/19/2016    EGFRCLEARA 82 07/19/2016       No results found for: PSA     Lab Results:  CBC:    Lab Results - Last 18 Months  Lab Units 07/19/16  0930   WBC K/mcL 5.15   HEMATOCRIT % 44.7     CMP:    Lab Results - Last 18 Months  Lab Units 07/19/16  0930   SODIUM mmol/L 143   CHLORIDE mmol/L 104   TOTAL CO2 mmol/L 27   BUN mg/dL 19   CREATININE mg/dL 0.97   CALCIUM mg/dL 9.0     THYROID:No results for input(s): TSH, T3FREE, FREET4 in the last 86594 hours.    Invalid input(s): T3, T4  A1C:No results for input(s): HGBA1C in the last 24953 hours.    Objective   /78 (BP Location: Left arm, Patient Position: Sitting, Cuff Size: Large Adult)  Pulse 80  Temp 98.7 °F (37.1 °C) (Oral)   Resp 18  Ht 69\" (175.3 cm)  Wt 183 lb (83 kg)  SpO2 96%  BMI 27.02 kg/m2    Physical Exam   See form  GENERAL:  Well nourished/developed in no acute distress.  SKIN: Turgor excellent, without wound, rash, lesion.  HEENT: Normal cephalic without trauma.  Pupils equal round reactive to light. Extraocular motions full without nystagmus.   External canals nonobstructive nontender without reddness. Tymphatic membranes lucy with willian structures intact.   Oral cavity without growths, exudates, and moist.  Posterior pharnyx without mass, obstruction, reddness.  No thyroidmegaly, mass, tenderness, lymphadenopathy and supple.  CV: Regular rhythm.  No murmur, gallop,  edema. Posterior pulses intact.  No carotid bruits.  CHEST: No chest wall tenderness or mass. "   LUNGS: Symmetric motion with clear to auscultation.  No dullness to percussion  ABD: Soft, nontender without mass.   PROSTATE: No visible/palpable lesion/tenderness and anal tone intact/full.  Prostate 20 gm/smooth and nontender.  No rectal mass within reach. Stool on glove brown.   ORTHO: Symmetric extremities without swelling/point tenderness.  Full gross range of motion.  NEURO: CN 2-12 grossly intact.  Symmetric facies. 2/4 x bicep knee equal reflexes.  UE/LE   5/5 strength throughout.  Nonfocal use extremities. Speech clear.  Intact light touch with monofilament, vibratory sensation with tuning fork; equal toes/distal feet.  Finger/nose intact.     PSYCH: Oriented x 3.  Pleasant calm, well kept.  Purposeful/directed conservation with intact short/long gross memory.     Assessment/Plan     1. Encounter for examination required by Department of Transportation (DOT)  Doing well    2. Perennial allergic rhinitis, unspecified allergic rhinitis trigger    3. Asthma with status asthmaticus, unspecified asthma severity      Rx: reviewed.  Any other changes above and:   LAB: reviewed/above.  Orders above and:   Wrap-up/other instructions: discussed as applicable discussed  Regular cardio exercise something everyone should consider and try to do; even if health limitations (ie find that exercise UE/LE/cardio that they can tolerate).  Normal weight a goal for everyone.  Healthy diet helpful for weight management, illness prevention.  If over 50-screening exams include men PSA/rectal exam, women mammograms, and  everyone colonoscopy screening for colon cancer.     There are no Patient Instructions on file for this visit.    Follow up: Return for 1) Dr Pedersen yearly; fasting labs anytime due.  Future Appointments  Date Time Provider Department Center   11/29/2017 9:30 AM Oscar Urrutia MD [facility] None   1/12/2018 2:45 PM Aquilino Pedersen MD [facility] None

## 2025-06-19 DIAGNOSIS — R41.840 POOR CONCENTRATION: ICD-10-CM

## 2025-06-19 NOTE — TELEPHONE ENCOUNTER
Rx Refill Note  Requested Prescriptions     Pending Prescriptions Disp Refills    methylphenidate (Concerta) 36 MG CR tablet 30 tablet 0     Sig: Take 1 tablet by mouth Every Morning      Last office visit with office: 03/24/25  Next office visit with office: none    UDS: 10/21/24    DATE OF LAST REFILL: 05/19/25    Controlled Substance Agreement: up to date    ALECIA OR ANGELINEP: 06/19/25         {TIP  Is Refill Pharmacy correct?:  Melodie Hahn MA  06/19/25, 08:09 CDT

## 2025-06-20 RX ORDER — METHYLPHENIDATE HYDROCHLORIDE 36 MG/1
36 TABLET ORAL EVERY MORNING
Qty: 30 TABLET | Refills: 0 | Status: SHIPPED | OUTPATIENT
Start: 2025-06-20

## 2025-07-21 DIAGNOSIS — R41.840 POOR CONCENTRATION: ICD-10-CM

## 2025-07-21 RX ORDER — METHYLPHENIDATE HYDROCHLORIDE 36 MG/1
36 TABLET ORAL EVERY MORNING
Qty: 30 TABLET | Refills: 0 | Status: SHIPPED | OUTPATIENT
Start: 2025-07-21

## 2025-07-21 NOTE — TELEPHONE ENCOUNTER
Patient needs a follow-up before the next fill.    Electronically signed by RIRI Hall, 07/21/25, 10:05 AM CDT.

## 2025-07-21 NOTE — TELEPHONE ENCOUNTER
Rx Refill Note  Requested Prescriptions     Pending Prescriptions Disp Refills    methylphenidate (Concerta) 36 MG CR tablet 30 tablet 0     Sig: Take 1 tablet by mouth Every Morning      Last office visit with office: 03/24/25  Next office visit with office: none     UDS: 10/21/24    DATE OF LAST REFILL: 06/20/25    Controlled Substance Agreement: up to date    ALECIA OR LIANET: LIANET Jean MA  07/21/25, 09:52 CDT

## 2025-08-20 DIAGNOSIS — R41.840 POOR CONCENTRATION: ICD-10-CM

## 2025-08-20 RX ORDER — METHYLPHENIDATE HYDROCHLORIDE 36 MG/1
36 TABLET ORAL EVERY MORNING
Qty: 30 TABLET | Refills: 0 | Status: SHIPPED | OUTPATIENT
Start: 2025-08-20

## (undated) DEVICE — MASK,OXYGEN,MED CONC,ADLT,7' TUB, UC: Brand: PENDING

## (undated) DEVICE — WEBRIL* CAST PADDING: Brand: DEROYAL

## (undated) DEVICE — TBG SMPL FLTR LINE NASL 02/C02 A/ BX/100

## (undated) DEVICE — THE CHANNEL CLEANING BRUSH IS A NYLON FLEXI BRUSH ATTACHED TO A FLEXIBLE PLASTIC SHEATH DESIGNED TO SAFELY REMOVE DEBRIS FROM FLEXIBLE ENDOSCOPES.

## (undated) DEVICE — SENSR O2 OXIMAX FNGR A/ 18IN NONSTR

## (undated) DEVICE — CUFF,BP,DISP,1 TUBE,ADULT,HP: Brand: MEDLINE

## (undated) DEVICE — 4-PORT MANIFOLD: Brand: NEPTUNE 2

## (undated) DEVICE — DISPOSABLE TOURNIQUET CUFF 24"X4", 1-LINE, YELLOW, STERILE, 1EA/PK, 10PK/CS: Brand: ASP MEDICAL

## (undated) DEVICE — STRIP PACKING W IODOFORM 1/4

## (undated) DEVICE — ESOPHAGEAL BALLOON DILATATION CATHETER: Brand: CRE FIXED WIRE

## (undated) DEVICE — PREP SOL POVIDONE/IODINE BT 4OZ

## (undated) DEVICE — TRAP FLD MINIVAC MEGADYNE 100ML

## (undated) DEVICE — BNDG ELAS ECON W/CLIP 4IN 5YD LF STRL

## (undated) DEVICE — GLV SURG SENSICARE W/ALOE PF LF 7.5 STRL

## (undated) DEVICE — DRESSING,GAUZE,XEROFORM,CURAD,5"X9",ST: Brand: CURAD

## (undated) DEVICE — Device: Brand: DEFENDO AIR/WATER/SUCTION AND BIOPSY VALVE

## (undated) DEVICE — ANTIBACTERIAL UNDYED BRAIDED (POLYGLACTIN 910), SYNTHETIC ABSORBABLE SUTURE: Brand: COATED VICRYL

## (undated) DEVICE — GLV SURG DERMASSURE GRN LF PF 8.0

## (undated) DEVICE — IRRIGATOR BULB ASEPTO 60CC STRL

## (undated) DEVICE — DEV INFL ALLIANCE2 SYS

## (undated) DEVICE — DRSNG WND GZ CURAD OIL EMULSION 3X3IN STRL

## (undated) DEVICE — CVR BRD ARM 13X30

## (undated) DEVICE — PADDING,UNDERCAST,COTTON, 4"X4YD STERILE: Brand: MEDLINE

## (undated) DEVICE — CONMED SCOPE SAVER BITE BLOCK, 20X27 MM: Brand: SCOPE SAVER

## (undated) DEVICE — Y-TYPE TUR/BLADDER IRRIGATION SET, REGULATING CLAMP

## (undated) DEVICE — YANKAUER,BULB TIP WITH VENT: Brand: ARGYLE

## (undated) DEVICE — PK EXTRM 30

## (undated) DEVICE — CYSTO/BLADDER IRRIGATION SET, REGULATING CLAMP